# Patient Record
Sex: MALE | Race: WHITE | NOT HISPANIC OR LATINO | Employment: OTHER | URBAN - METROPOLITAN AREA
[De-identification: names, ages, dates, MRNs, and addresses within clinical notes are randomized per-mention and may not be internally consistent; named-entity substitution may affect disease eponyms.]

---

## 2017-03-16 ENCOUNTER — OFFICE VISIT (OUTPATIENT)
Dept: CARDIOLOGY | Facility: CLINIC | Age: 77
DRG: 292 | End: 2017-03-16
Payer: MEDICARE

## 2017-03-16 ENCOUNTER — HOSPITAL ENCOUNTER (INPATIENT)
Facility: HOSPITAL | Age: 77
LOS: 4 days | Discharge: HOME | DRG: 292 | End: 2017-03-20
Attending: EMERGENCY MEDICINE | Admitting: INTERNAL MEDICINE
Payer: MEDICARE

## 2017-03-16 VITALS
SYSTOLIC BLOOD PRESSURE: 98 MMHG | HEART RATE: 146 BPM | HEIGHT: 66 IN | DIASTOLIC BLOOD PRESSURE: 54 MMHG | WEIGHT: 145.31 LBS | BODY MASS INDEX: 23.35 KG/M2

## 2017-03-16 DIAGNOSIS — N18.30 CKD (CHRONIC KIDNEY DISEASE) STAGE 3, GFR 30-59 ML/MIN: ICD-10-CM

## 2017-03-16 DIAGNOSIS — I50.33 ACUTE ON CHRONIC DIASTOLIC CONGESTIVE HEART FAILURE: ICD-10-CM

## 2017-03-16 DIAGNOSIS — I48.91 ATRIAL FIBRILLATION WITH RAPID VENTRICULAR RESPONSE: ICD-10-CM

## 2017-03-16 DIAGNOSIS — I50.9 CONGESTIVE HEART FAILURE, UNSPECIFIED CONGESTIVE HEART FAILURE CHRONICITY, UNSPECIFIED CONGESTIVE HEART FAILURE TYPE: ICD-10-CM

## 2017-03-16 DIAGNOSIS — R79.89 ELEVATED BRAIN NATRIURETIC PEPTIDE (BNP) LEVEL: ICD-10-CM

## 2017-03-16 DIAGNOSIS — I42.9 CARDIOMYOPATHY: ICD-10-CM

## 2017-03-16 DIAGNOSIS — I48.20 CHRONIC ATRIAL FIBRILLATION: Primary | ICD-10-CM

## 2017-03-16 DIAGNOSIS — I48.91 ATRIAL FIBRILLATION WITH RVR: Primary | Chronic | ICD-10-CM

## 2017-03-16 LAB
ALBUMIN SERPL BCP-MCNC: 3.1 G/DL
ALP SERPL-CCNC: 94 U/L
ALT SERPL W/O P-5'-P-CCNC: 27 U/L
ANION GAP SERPL CALC-SCNC: 9 MMOL/L
APTT BLDCRRT: 28 SEC
AST SERPL-CCNC: 29 U/L
BASOPHILS # BLD AUTO: 0.03 K/UL
BASOPHILS NFR BLD: 0.4 %
BILIRUB SERPL-MCNC: 0.9 MG/DL
BNP SERPL-MCNC: 2868 PG/ML
BUN SERPL-MCNC: 34 MG/DL
CALCIUM SERPL-MCNC: 8.7 MG/DL
CHLORIDE SERPL-SCNC: 108 MMOL/L
CO2 SERPL-SCNC: 23 MMOL/L
CREAT SERPL-MCNC: 1.8 MG/DL
DIFFERENTIAL METHOD: ABNORMAL
EOSINOPHIL # BLD AUTO: 0.1 K/UL
EOSINOPHIL NFR BLD: 0.8 %
ERYTHROCYTE [DISTWIDTH] IN BLOOD BY AUTOMATED COUNT: 15.5 %
EST. GFR  (AFRICAN AMERICAN): 41 ML/MIN/1.73 M^2
EST. GFR  (NON AFRICAN AMERICAN): 35 ML/MIN/1.73 M^2
GLUCOSE SERPL-MCNC: 92 MG/DL
HCT VFR BLD AUTO: 37.3 %
HGB BLD-MCNC: 11.7 G/DL
INR PPP: 1.2
LYMPHOCYTES # BLD AUTO: 1.2 K/UL
LYMPHOCYTES NFR BLD: 14.6 %
MCH RBC QN AUTO: 29.7 PG
MCHC RBC AUTO-ENTMCNC: 31.4 %
MCV RBC AUTO: 95 FL
MONOCYTES # BLD AUTO: 1.1 K/UL
MONOCYTES NFR BLD: 12.9 %
NEUTROPHILS # BLD AUTO: 6 K/UL
NEUTROPHILS NFR BLD: 71.3 %
PLATELET # BLD AUTO: 179 K/UL
PMV BLD AUTO: 10.2 FL
POTASSIUM SERPL-SCNC: 4.7 MMOL/L
PROT SERPL-MCNC: 6.8 G/DL
PROTHROMBIN TIME: 12.6 SEC
RBC # BLD AUTO: 3.94 M/UL
SODIUM SERPL-SCNC: 140 MMOL/L
TROPONIN I SERPL DL<=0.01 NG/ML-MCNC: 0.03 NG/ML
TROPONIN I SERPL DL<=0.01 NG/ML-MCNC: 0.04 NG/ML
WBC # BLD AUTO: 8.4 K/UL

## 2017-03-16 PROCEDURE — 85025 COMPLETE CBC W/AUTO DIFF WBC: CPT

## 2017-03-16 PROCEDURE — 84484 ASSAY OF TROPONIN QUANT: CPT | Mod: 91

## 2017-03-16 PROCEDURE — 63600175 PHARM REV CODE 636 W HCPCS: Performed by: EMERGENCY MEDICINE

## 2017-03-16 PROCEDURE — 99215 OFFICE O/P EST HI 40 MIN: CPT | Mod: S$GLB,,, | Performed by: INTERNAL MEDICINE

## 2017-03-16 PROCEDURE — 96374 THER/PROPH/DIAG INJ IV PUSH: CPT

## 2017-03-16 PROCEDURE — 99285 EMERGENCY DEPT VISIT HI MDM: CPT | Mod: 25

## 2017-03-16 PROCEDURE — 96361 HYDRATE IV INFUSION ADD-ON: CPT

## 2017-03-16 PROCEDURE — 96376 TX/PRO/DX INJ SAME DRUG ADON: CPT

## 2017-03-16 PROCEDURE — 85730 THROMBOPLASTIN TIME PARTIAL: CPT

## 2017-03-16 PROCEDURE — 25000003 PHARM REV CODE 250: Performed by: EMERGENCY MEDICINE

## 2017-03-16 PROCEDURE — 1159F MED LIST DOCD IN RCRD: CPT | Mod: S$GLB,,, | Performed by: INTERNAL MEDICINE

## 2017-03-16 PROCEDURE — 99999 PR PBB SHADOW E&M-EST. PATIENT-LVL III: CPT | Mod: PBBFAC,,, | Performed by: INTERNAL MEDICINE

## 2017-03-16 PROCEDURE — 80053 COMPREHEN METABOLIC PANEL: CPT

## 2017-03-16 PROCEDURE — 25000003 PHARM REV CODE 250: Performed by: NURSE PRACTITIONER

## 2017-03-16 PROCEDURE — 93010 ELECTROCARDIOGRAM REPORT: CPT | Mod: ,,, | Performed by: INTERNAL MEDICINE

## 2017-03-16 PROCEDURE — 83880 ASSAY OF NATRIURETIC PEPTIDE: CPT

## 2017-03-16 PROCEDURE — 96375 TX/PRO/DX INJ NEW DRUG ADDON: CPT

## 2017-03-16 PROCEDURE — 85610 PROTHROMBIN TIME: CPT

## 2017-03-16 PROCEDURE — 11000001 HC ACUTE MED/SURG PRIVATE ROOM

## 2017-03-16 PROCEDURE — 1157F ADVNC CARE PLAN IN RCRD: CPT | Mod: S$GLB,,, | Performed by: INTERNAL MEDICINE

## 2017-03-16 PROCEDURE — 93000 ELECTROCARDIOGRAM COMPLETE: CPT | Mod: S$GLB,,, | Performed by: NUCLEAR MEDICINE

## 2017-03-16 PROCEDURE — 1160F RVW MEDS BY RX/DR IN RCRD: CPT | Mod: S$GLB,,, | Performed by: INTERNAL MEDICINE

## 2017-03-16 RX ORDER — DILTIAZEM HYDROCHLORIDE 5 MG/ML
10 INJECTION INTRAVENOUS
Status: COMPLETED | OUTPATIENT
Start: 2017-03-16 | End: 2017-03-16

## 2017-03-16 RX ORDER — FUROSEMIDE 10 MG/ML
40 INJECTION INTRAMUSCULAR; INTRAVENOUS
Status: COMPLETED | OUTPATIENT
Start: 2017-03-16 | End: 2017-03-16

## 2017-03-16 RX ORDER — NAPROXEN SODIUM 220 MG/1
81 TABLET, FILM COATED ORAL DAILY
Status: DISCONTINUED | OUTPATIENT
Start: 2017-03-17 | End: 2017-03-20 | Stop reason: HOSPADM

## 2017-03-16 RX ORDER — ENOXAPARIN SODIUM 100 MG/ML
30 INJECTION SUBCUTANEOUS EVERY 24 HOURS
Status: DISCONTINUED | OUTPATIENT
Start: 2017-03-16 | End: 2017-03-17

## 2017-03-16 RX ORDER — DILTIAZEM HYDROCHLORIDE 5 MG/ML
5 INJECTION INTRAVENOUS
Status: COMPLETED | OUTPATIENT
Start: 2017-03-16 | End: 2017-03-16

## 2017-03-16 RX ORDER — ONDANSETRON 2 MG/ML
4 INJECTION INTRAMUSCULAR; INTRAVENOUS EVERY 12 HOURS PRN
Status: DISCONTINUED | OUTPATIENT
Start: 2017-03-16 | End: 2017-03-20 | Stop reason: HOSPADM

## 2017-03-16 RX ORDER — FUROSEMIDE 10 MG/ML
40 INJECTION INTRAMUSCULAR; INTRAVENOUS 2 TIMES DAILY
Status: DISCONTINUED | OUTPATIENT
Start: 2017-03-17 | End: 2017-03-20

## 2017-03-16 RX ORDER — METOPROLOL TARTRATE 25 MG/1
25 TABLET, FILM COATED ORAL 2 TIMES DAILY
Status: DISCONTINUED | OUTPATIENT
Start: 2017-03-16 | End: 2017-03-17

## 2017-03-16 RX ADMIN — METOPROLOL TARTRATE 25 MG: 25 TABLET ORAL at 09:03

## 2017-03-16 RX ADMIN — ENOXAPARIN SODIUM 30 MG: 100 INJECTION SUBCUTANEOUS at 09:03

## 2017-03-16 RX ADMIN — DILTIAZEM HYDROCHLORIDE 5 MG: 5 INJECTION INTRAVENOUS at 06:03

## 2017-03-16 RX ADMIN — DILTIAZEM HYDROCHLORIDE 10 MG: 5 INJECTION INTRAVENOUS at 03:03

## 2017-03-16 RX ADMIN — FUROSEMIDE 40 MG: 10 INJECTION, SOLUTION INTRAMUSCULAR; INTRAVENOUS at 07:03

## 2017-03-16 NOTE — IP AVS SNAPSHOT
[address] [place] [address]           Patient Discharge Instructions     Our goal is to set you up for success. This packet includes information on your condition, medications, and your home care. It will help you to care for yourself so you don't get sicker and need to go back to the hospital.     Please ask your nurse if you have any questions.        There are many details to remember when preparing to leave the hospital. Here is what you will need to do:    1. Take your medicine. If you are prescribed medications, review your Medication List in the following pages. You may have new medications to  at the pharmacy and others that you'll need to stop taking. Review the instructions for how and when to take your medications. Talk with your doctor or nurses if you are unsure of what to do.     2. Go to your follow-up appointments. Specific follow-up information is listed in the following pages. Your may be contacted by a transition nurse or clinical provider about future appointments. Be sure we have all of the phone numbers to reach you, if needed. Please contact your provider's office if you are unable to make an appointment.     3. Watch for warning signs. Your doctor or nurse will give you detailed warning signs to watch for and when to call for assistance. These instructions may also include educational information about your condition. If you experience any of warning signs to your health, call your doctor.               ** Verify the list of medication(s) below is accurate and up to date. Carry this with you in case of emergency. If your medications have changed, please notify your healthcare provider.             Medication List      START taking these medications        Additional Info                      apixaban 2.5 mg Tab   Quantity:  60 tablet   Refills:  0   Dose:  2.5 mg    Last time this was given:  2.5 mg on 3/20/2017  8:23 AM    Instructions:  Take 1 tablet (2.5 mg total) by mouth 2 (two) times daily.     Begin Date    AM    Noon    PM    Bedtime       aspirin 81 MG Chew   Refills:  0   Dose:  81 mg    Last time this was given:  81 mg on 3/20/2017  8:23 AM   Instructions:  Take 1 tablet (81 mg total) by mouth once daily.     Begin Date    AM    Noon    PM    Bedtime       losartan 25 MG tablet   Commonly known as:  COZAAR   Quantity:  30 tablet   Refills:  0   Dose:  25 mg    Last time this was given:  25 mg on 3/20/2017  8:23 AM   Instructions:  Take 1 tablet (25 mg total) by mouth once daily.     Begin Date    AM    Noon    PM    Bedtime         CHANGE how you take these medications        Additional Info                      furosemide 40 MG tablet   Commonly known as:  LASIX   Quantity:  30 tablet   Refills:  0   Dose:  40 mg   What changed:    - medication strength  - how much to take    Start Date:  3/21/2017   Instructions:  Take 1 tablet (40 mg total) by mouth once daily.     Begin Date    AM    Noon    PM    Bedtime       metoprolol tartrate 50 MG tablet   Commonly known as:  LOPRESSOR   Quantity:  90 tablet   Refills:  0   Dose:  50 mg   What changed:  when to take this    Last time this was given:  50 mg on 3/19/2017  2:20 PM   Instructions:  Take 1 tablet (50 mg total) by mouth every 8 (eight) hours.     Begin Date    AM    Noon    PM    Bedtime            Where to Get Your Medications      These medications were sent to [facility] [address] [facility]  [address] [facility] [address]     Phone:  [phone]     apixaban 2.5 mg Tab    furosemide 40 MG tablet    losartan 25 MG tablet    metoprolol tartrate 50 MG tablet         You can get these medications from any pharmacy     You don't need a prescription for these medications     aspirin 81 MG Chew                  Please bring to all follow up appointments:    1. A copy of your discharge instructions.  2. All medicines you are currently taking in  their original bottles.  3. Identification and insurance card.    Please arrive 15 minutes ahead of scheduled appointment time.    Please call 24 hours in advance if you must reschedule your appointment and/or time.        Follow-up Information     Follow up with Miller Dinero MD In 3 days.    Specialty:  Internal Medicine    Why:  hospital follow up - please evaluate need for continued Lasix dosage    Contact information:    [address]   [address]  [place] [address] [phone]          Follow up with Filipe Bloom NP In 1 week.    Specialty:  Cardiology    Why:  follow up for Atrial fibrillation     Contact information:    [address]  [facility] [address] [phone]          Discharge Instructions     Future Orders    Activity as tolerated     Call MD for:  difficulty breathing or increased cough     Call MD for:  increased confusion or weakness     Call MD for:  persistent dizziness, light-headedness, or visual disturbances     Call MD for:  persistent nausea and vomiting or diarrhea     Call MD for:  severe uncontrolled pain     Call MD for:  temperature >100.4     Diet general     Questions:    Total calories:      Fat restriction, if any:      Protein restriction, if any:      Na restriction, if any:  2gNa    Fluid restriction:      Additional restrictions:          Primary Diagnosis     Your primary diagnosis was:  Atrial Fibrillation (Irregular Heartbeat)      Admission Information     Date & Time Provider Department [facility]    3/16/2017  3:25 PM Cosme Davey MD [facility] -  [phone]      Care Providers     Provider Role Specialty Primary office phone    Cosme Davey MD Attending Provider Internal Medicine [phone]    Roya Dodge MD Consulting Physician  Cardiology [phone]    Jose A Knowles MD Team Attending  General Practice [phone]      Your Vitals Were     BP Pulse Temp Resp Height Weight    88/64 (BP Location: Left arm, Patient Position:  "Lying, BP Method: Automatic) 95 97.8 °F (36.6 °C) (Oral) 18 5' 9" (1.753 m) 64.8 kg (142 lb 12.8 oz)    SpO2 BMI             96% 21.09 kg/m2         Recent Lab Values     No lab values to display.      Allergies as of 3/20/2017     No Known Allergies      [facility]     [facility] Nurse Care Line - 24/7 Assistance  Unless otherwise directed by your provider, please contact [facility], our nurse care line that is available for 24/7 assistance.     Registered nurses in the [facility] provide clinical advisement, health education, appointment booking, and other advisory services.  Call for this free service at [phone].        Advance Directives     An advance directive is a document which, in the event you are no longer able to make decisions for yourself, tells your healthcare team what kind of treatment you do or do not want to receive, or who you would like to make those decisions for you.  If you do not currently have an advance directive, [facility] encourages you to create one.  For more information call:  [phone]), [phone] ([phone]),  or log on to [email or web address].        Language Assistance Services     ATTENTION: Language assistance services are available, free of charge. Please call [phone].      ATENCIÓN: Si habla español, tiene a ozuna disposición servicios gratuitos de asistencia lingüística. Llame al [phone].     [facility] Ý: N?u b?n nói Ti?ng Vi?t, có các d?ch v? h? tr? ngôn ng? mi?n phí dành cho b?n. G?i s? [phone].        Heart Failure Education       Heart Failure: Being Active  You have a condition called heart failure. Being active doesnt mean that you have to wear yourself out. Even a little movement each day helps to strengthen your heart. If you cant get out to exercise, you can do simple stretching and strengthening exercises at home. These are good ways to keep you well-conditioned and prevent you and your heart " from becoming excessively weak.    Ideas to get you started  · Add a little movement to things you do now. Walk to mail letters. Park your car at the far end of the parking lot and walk to the store. Walk up a flight of stairs instead of taking the elevator.  · Choose activities you enjoy. You might walk, swim, or ride an exercise bike. Things like gardening and washing the car count, too. Other possibilities include: washing dishes, walking the dog, walking around the mall, and doing aerobic activities with friends.  · Join a group exercise program at a [facility] or [facility], a senior center, or a community center. Or look into a hospital cardiac rehabilitation program. Ask your doctor if you qualify.  Tips to keep you going  · Get up and get dressed each day. Go to a coffee shop and read a newspaper or go somewhere that you'll be in the presence of other active people. Youll feel more like being active.  · Make a plan. Choose one or more activities that you enjoy and that you can easily do. Then plan to do at least one each day. You might write your plan on a calendar.  · Go with a friend or a group if you like company. This can help you feel supported and stay motivated, too.  · Plan social events that you enjoy. This will keep you mentally engaged as well as physically motivated to do things you find pleasure in.  For your safety  · Talk with your healthcare provider before starting an exercise program.  · Exercise indoors when its too hot or too cold outside, or when the air quality is poor. Try walking at a shopping mall.  · Wear socks and sturdy shoes to maintain your balance and prevent falls.  · Start slowly. Do a few minutes several times a day at first. Increase your time and speed little by little.  · Stop and rest whenever you feel tired or get short of breath.  · Dont push yourself on days when you dont feel well.  Date Last Reviewed: 3/20/2016  © 8821-7391 [employer]. [address] [employer],  [place] [address]. All rights reserved. This information is not intended as a substitute for professional medical care. Always follow your healthcare professional's instructions.              Heart Failure: Evaluating Your Heart  You have a condition called heart failure. To evaluate your condition, your doctor will examine you, ask questions, and do some tests. Along with looking for signs of heart failure, the doctor looks for any other health problems that may have led to heart failure. The results of your evaluation will help your doctor form a treatment plan.  Health history and physical exam  Your visit will start with a health history. Tell the doctor about any symptoms youve noticed and about all medicines you take. Then youll have a physical exam. This includes listening to your heartbeat and breathing. Youll also be checked for swelling (edema) in your legs and neck. When you have fluid buildup or fluid in the lungs, it may be called congestive heart failure.  Diagnosing heart failure     During an echocardiogram, sound waves bounce off the heart. These are converted into a picture on the screen.   The following may be done to help your doctor form a diagnosis:  · X-rays show the size and shape of your heart. These pictures can also show fluid in your lungs.  · An electrocardiogram (ECG or EKG) shows the pattern of your heartbeat. Small pads (electrodes) are placed on your chest, arms, and legs. Wires connect the pads to the ECG machine, which records your hearts electrical signals. This can give the doctor information about heart function.  · An echocardiogram uses ultrasound waves to show the structure and movement of your heart muscle. This shows how well the heart pumps. It also shows the thickness of the heart walls, and if the heart is enlarged. It is one of the most useful, non-invasive tests as it provides information about the heart's general function. This helps your doctor make treatment  decisions.  · Lab tests evaluate small amounts of blood or urine for signs of problems. A BNP lab test can help diagnose and evaluate heart failure. BNP stands for B-type natriuretic peptide. The ventricles secrete more BNP when heart failure worsens. Lab tests can also provide information about metabolic dysfunction or heart dysfunction.  Your treatment plan  Based on the results of your evaluation and tests, your doctor will develop a treatment plan. This plan is designed to relieve some of your heart failure symptoms and help make you more comfortable. Your treatment plan may include:  · Medicine to help your heart work better and improve your quality of life  · Changes in what you eat and drink to help prevent fluid from backing up in your body  · Daily monitoring of your weight and heart failure symptoms to see how well your treatment plan is working  · Exercise to help you stay healthy  · Help with quitting smoking  · Emotional and psychological support to help adjust to the changes  · Referrals to other specialists to make sure you are being treated comprehensively  Date Last Reviewed: 3/21/2016  © 9704-0885 [employer]. [address] [place] [address], [place] [address]. All rights reserved. This information is not intended as a substitute for professional medical care. Always follow your healthcare professional's instructions.              Heart Failure: Making Changes to Your Diet  You have a condition called heart failure. When you have heart failure, excess fluid is more likely to build up in your body because your heart isn't working well. This makes the heart work harder to pump blood. Fluid buildup causes symptoms such as shortness of breath and swelling (edema). This is often referred to as congestive heart failure or CHF. Controlling the amount of salt (sodium) you eat may help stop fluid from building up. Your doctor may also tell you to reduce the amount of fluid you drink.  Reading food  labels    Your healthcare provider will tell you how much sodium you can eat each day. Read food labels to keep track. Keep in mind that certain foods are high in salt. These include canned, frozen, and processed foods. Check the amount of sodium in each serving. Watch out for high-sodium ingredients. These include MSG (monosodium glutamate), baking soda, and sodium phosphate.   Eating less salt  Give yourself time to get used to eating less salt. It may take a little while. Here are some tips to help:  · Take the saltshaker off the table. Replace it with salt-free herb mixes and spices.  · Eat fresh or plain frozen vegetables. These have much less salt than canned vegetables.  · Choose low-sodium snacks like sodium-free pretzels, crackers, or air-popped popcorn.  · Dont add salt to your food when youre cooking. Instead, season your foods with pepper, lemon, garlic, or onion.  · When you eat out, ask that your food be cooked without added salt.  · Avoid eating fried foods as these often have a great deal of salt.  If youre told to limit fluids  You may need to limit how much fluid you have to help prevent swelling. This includes anything that is liquid at room temperature, such as ice cream and soup. If your doctor tells you to limit fluid, try these tips:  · Measure drinks in a measuring cup before you drink them. This will help you meet daily goals.  · Chill drinks to make them more refreshing.  · Suck on frozen lemon wedges to quench thirst.  · Only drink when youre thirsty.  · Chew sugarless gum or suck on hard candy to keep your mouth moist.  · Weigh yourself daily to know if your body's fluid content is rising.  My sodium goal  Your healthcare provider may give you a sodium goal to meet each day. This includes sodium found in food as well as salt that you add. My goal is to eat no more than ___________ mg of sodium per day.     When to call your doctor  Call your doctor right away if you have any symptoms  of worsening heart failure. These can include:  · Sudden weight gain  · Increased swelling of your legs or ankles  · Trouble breathing when youre resting or at night  · Increase in the number of pillows you have to sleep on  · Chest pain, pressure, discomfort, or pain in the jaw, neck, or back   Date Last Reviewed: 3/21/2016  © 6599-7187 [employer]. [address] [place] [address], [place] [address]. All rights reserved. This information is not intended as a substitute for professional medical care. Always follow your healthcare professional's instructions.              Heart Failure: Medicines to Help Your Heart    You have a condition called heart failure (also known as congestive heart failure, or CHF). Your doctor will likely prescribe medicines for heart failure and any underlying health problems you have. Most heart failure patients take one or more types of medicinen. Your healthcare provider will work to find the combination of medicines that works best for you.  Heart failure medicines  Here are the most common heart failure medicines:  · ACE inhibitors lower blood pressure and decrease strain on the heart. This makes it easier for the heart to pump. Angiotensin receptor blockers have similar effects. These are prescribed for some patients instead of ACE inhibitors.  · Beta-blockers relieve stress on the heart. They also improve symptoms. They may also improve the heart's pumping action over time.  · Diuretics (also called water pills) help rid your body of excess water. This can help rid your body of swelling (edema). Having less fluid to pump means your heart doesnt have to work as hard. Some diuretics make your body lose a mineral called potassium. Your doctor will tell you if you need to take supplements or eat more foods high in potassium.  · Digoxin helps your heart pump with more strength. This helps your heart pump more blood with each beat. So, more oxygen-rich blood travels to the rest  of the body.  · Aldosterone antagonists help alter hormones and decrease strain on the heart.  · Hydralazine and nitrates are two separate medicines used together to treat heart failure. They may come in one combination pill. They lower blood pressure and decrease how hard the heart has to pump.  Medicines for related conditions  Controlling other heart problems helps keep heart failure under control, too. Depending on other heart problems you have, medicines may be prescribed to:  · Lower blood pressure (antihypertensives).  · Lower cholesterol levels (statins).  · Prevent blood clots (anticoagulants or aspirin).  · Keep the heartbeat steady (antiarrhythmics).  Date Last Reviewed: 3/5/2016  © 8154-5794 [employer]. [address] [place] [address], [place] [address]. All rights reserved. This information is not intended as a substitute for professional medical care. Always follow your healthcare professional's instructions.              Heart Failure: Procedures That May Help    The heart is a muscle that pumps oxygen-rich blood to all parts of the body. When you have heart failure, the heart is not able to pump as well as it should. Blood and fluid may back up into the lungs (congestive heart failure), and some parts of the body dont get enough oxygen-rich blood to work normally. These problems lead to the symptoms of heart failure.     Certain procedures may help the heart pump better in some cases of heart failure. Some procedures are done to treat health problems that may have caused the heart failure such as coronary artery disease or heart rhythm problems. For more serious heart failure, other options are available.  Treating artery and valve problems  If you have coronary artery disease or valve disease, procedures may be done to improve blood flow. This helps the heart pump better, which can improve heart failure symptoms. First, your doctor may do a cardiac catheterization to help detect clogged  blood vessels or valve damage. During this procedure, a  thin tube (catheter) in inserted into a blood vessel and guided to the heart. There a dye is injected and a special type of X-ray (angiogram) is taken of the blood vessels. Procedures to open a blocked artery or fix damaged valves can also be done using catheterization.  · Angioplasty uses a balloon-tipped instrument at the end of the catheter. The balloon is inflated to widen the narrowed artery. In many cases, a stent is expanded to further support the narrowed artery. A stent is a metal mesh tube.  · Valve surgery repairs or replacement of faulty valves can also be done during catheterization so blood can flow properly through the chambers of the heart.  Bypass surgery is another option to help treat blocked arteries. It uses a healthy blood vessel from elsewhere in the body. The healthy blood vessel is attached above and below the blocked area so that blood can flow around the blocked artery.  Treating heart rhythm problems  A device may be placed in the chest to help a weak heart maintain a healthy, heartbeat so the heart can pump more effectively:  · Pacemaker. A pacemaker is an implanted device that regulates your heartbeat electronically. It monitors your heart's rhythm and generates a painless electric impulse that helps the heart beat in a regular rhythm. A pacemaker is programmed to meet your specific heart rhythm needs.  · Biventricular pacing/cardiac resynchronization therapy. A type of pacemaker that paces both pumping chambers of the heart at the same time to coordinate contractions and to improve the heart's function. Some people with heart failure are candidates for this therapy.  · Implantable cardioverter defibrillator. A device similar to a pacemaker that senses when the heart is beating too fast and delivers an electrical shock to convert the fast rhythm to a normal rhythm. This can be a life saving device.  In severe cases  In more  serious cases of heart failure when other treatments no longer work, other options may include:  · Ventricular assist devices (VADs). These are mechanical devices used to take over the pumping function for one or both of the heart's ventricles, or pumping chambers. A VAD may be necessary when heart failure progresses to the point that medicines and other treatments no longer help. In some cases, a VAD may be used as a bridge to transplant.  · Heart transplant. This is replacing the diseased heart with a healthy one from a donor. This is an option for a few people who are very sick. A heart transplant is very serious and not an option for all patients. Your doctor can tell you more.  Date Last Reviewed: 3/20/2016  © 2796-2602 [employer]. [address] [place] [address], [place] [address]. All rights reserved. This information is not intended as a substitute for professional medical care. Always follow your healthcare professional's instructions.              Heart Failure: Tracking Your Weight  You have a condition called heart failure. When you have heart failure, a sudden weight gain or a steady rise in weight is a warning sign that your body is retaining too much water and salt. This could mean your heart failure is getting worse. If left untreated, it can cause problems for your lungs and result in shortness of breath. Weighing yourself each day is the best way to know if youre retaining water. If your weight goes up quickly, call your doctor. You will be given instructions on how to get rid of the excess water. You will likely need medicines and to avoid salt. This will help your heart work better.  Call your doctor if you gain more than 2 pounds in 1 day, more than 5 pounds in 1 week, or whatever weight gain you were told to report by your doctor. This is often a sign of worsening heart failure and needs to be evaluated and treated. Your doctor will tell you what to do next.   Tips for weighing  yourself    · Weigh yourself at the same time each morning, wearing the same clothes. Weigh yourself after urinating and before eating.  · Use the same scale each day. Make sure the numbers are easy to read. Put the scale on a flat, hard surface -- not on a rug or carpet.  · Do not stop weighing yourself. If you forget one day, weigh again the next morning.  How to use your weight chart  · Keep your weight chart near the scale. Write your weight on the chart as soon as you get off the scale.  · Fill in the month and the start date on the chart. Then write down your weight each day. Your chart will look like this:    · If you miss a day, leave the space blank. Weigh yourself the next day and write your weight in the next space.  · Take your weight chart with you when you go to see your doctor.  Date Last Reviewed: 3/20/2016  © 5736-9024 [employer]. [address] [place] [address], [place] [address]. All rights reserved. This information is not intended as a substitute for professional medical care. Always follow your healthcare professional's instructions.              Heart Failure: Warning Signs of a Flare-Up  You have a condition called heart failure. Once you have heart failure, flare-ups can happen. Below are signs that can mean your heart failure is getting worse. If you notice any of these warning signs, call your healthcare provider.  Swelling    · Your feet, ankles, or lower legs get puffier.  · You notice skin changes on your lower legs.  · Your shoes feel too tight.  · Your clothes are tighter in the waist.  · You have trouble getting rings on or off your fingers.  Shortness of breath  · You have to breathe harder even when youre doing your normal activities or when youre resting.  · You are short of breath walking up stairs or even short distances.  · You wake up at night short of breath or coughing.  · You need to use more pillows or sit up to sleep.  · You wake up tired or restless.  Other  warning signs  · You feel weaker, dizzy, or more tired.  · You have chest pain or changes in your heartbeat.  · You have a cough that wont go away.  · You cant remember things or dont feel like eating.  Tracking your weight  Gaining weight is often the first warning sign that heart failure is getting worse. Gaining even a few pounds can be a sign that your body is retaining excess water and salt. Weighing yourself each day in the morning after you urinate and before you eat, is the best way to know if you're retaining water. Get a scale that is easy to read and make sure you wear the same clothes and use the same scale every time you weigh. Your healthcare provider will show you how to track your weight. Call your doctor if you gain more than 2 pounds in 1 day, 5 pounds in 1 week, or whatever weight gain you were told to report by your doctor. This is often a sign of worsening heart failure and needs to be evaluated and treated before it compromises your breathing. Your doctor will tell you what to do next.    Date Last Reviewed: 3/15/2016  © 9117-5263 [employer]. [address] [place] [address]. All rights reserved. This information is not intended as a substitute for professional medical care. Always follow your healthcare professional's instructions.              Chronic Kindey Disease Education             [employer]           [employer] Sign-Up     Activating your [facility] account is as easy as 1-2-3!     1) Visit [email or web address], select Sign Up Now, enter this activation code and your date of birth, then select Next.  [email or web address]  Expires: 4/30/2017  3:07 PM      2) Create a username and password to use when you visit [facility] in the future and select a security question in case you lose your password and select Next.    3) Enter your e-mail address and click Sign Up!    Additional Information  If you have questions, please e-mail [email or web address] or call  [phone] to talk to our [facility] staff. Remember, [facility] is NOT to be used for urgent needs. For medical emergencies, dial 911.          [facility] complies with applicable Federal civil rights laws and does not discriminate on the basis of race, color, national origin, age, disability, or sex.

## 2017-03-16 NOTE — ED NOTES
Family at the bedside. Oriented to room and plan of care, verbalized understanding. Cart in low position, siderails up x 2 and call bell in reach. Will continue to monitor.

## 2017-03-16 NOTE — ED PROVIDER NOTES
SCRIBE #1 NOTE: I, Jeri Supriya, am scribing for, and in the presence of, Raji Rabago MD. I have scribed the entire note.      History      Chief Complaint   Patient presents with    Atrial Fibrillation     with RVR, rate of 148, sent to ER by Dr. Lazaro; pt also c/o BLE edema and weeping leg wounds       Review of patient's allergies indicates:  No Known Allergies     HPI   HPI    3/16/2017, 3:54 PM   History obtained from the patient      History of Present Illness: Yan Pickering Jr. is a 77 y.o. male patient, with a PMHx of AFib, who presents to the Emergency Department for leg swelling which onset gradually a few months ago, but began worsening several days ago. Swelling is located to the BLE up to the thighs. Sx have been constant and moderate to severe in degree. No modifying factors noted. Pt was seen by Dr. Lazaro (Cardiology) PTA and was sent to ED, because pt was in AFib with RVR. Associated sx include LUNA, generalized weakness, fatigue, weight gain, and weeping from BLE. Pt states that his baseline weight is 112 and he currently weighs 145. Pt states that he took himself off of all of his medication several months ago. Pt denies any fever, chills, CP, diaphoresis, cough, calf pain, n/v, or dizziness. No further complaints at this time.       Arrival mode: Personal Transportation.     PCP: Miller Dinero MD       Past Medical History:  Past Medical History:   Diagnosis Date    Atrial fibrillation     CKD (chronic kidney disease) stage 3, GFR 30-59 ml/min     Mild mitral insufficiency     Moderate aortic insufficiency     Venous stasis dermatitis of both lower extremities        Past Surgical History:  Reviewed. Not pertinent       Family History:  Reviewed. Not pertinent     Social History:  Social History     Social History Main Topics    Smoking status: Never Smoker    Smokeless tobacco: Never Used    Alcohol use No    Drug use: No    Sexual activity: Unknown        ROS   Review of  Systems   Constitutional: Positive for fatigue and unexpected weight change. Negative for chills, diaphoresis and fever.        (+) generalized weakness   HENT: Negative for sore throat.    Respiratory: Positive for shortness of breath (LUNA). Negative for cough.    Cardiovascular: Positive for leg swelling. Negative for chest pain.   Gastrointestinal: Negative for abdominal pain, blood in stool, constipation, diarrhea, nausea and vomiting.   Genitourinary: Negative for dysuria.   Musculoskeletal: Negative for back pain.        (-) calf pain   Skin: Negative for rash.        (+) weeping from BLE   Neurological: Negative for weakness.   Hematological: Does not bruise/bleed easily.     Physical Exam    Initial Vitals   BP Pulse Resp Temp SpO2   03/16/17 1524 03/16/17 1524 03/16/17 1524 03/16/17 1524 03/16/17 1524   112/76 153 18 98 °F (36.7 °C) 97 %      Physical Exam  Nursing Notes and Vital Signs Reviewed.  Constitutional: Patient is in no acute distress. Awake and alert. Well-developed and well-nourished.  Head: Atraumatic. Normocephalic.  Eyes: PERRL. EOM intact. Conjunctivae are not pale. No scleral icterus.  ENT: Mucous membranes are moist. Oropharynx is clear and symmetric.    Neck: Supple. Full ROM. No lymphadenopathy.  Cardiovascular: Irregularly irregular rhythm. Tachycardic. No murmurs, rubs, or gallops. Distal pulses are 2+ and symmetric.  Pulmonary/Chest: No respiratory distress. Clear to auscultation bilaterally. No wheezing, rales, or rhonchi.  Abdominal: Soft and non-distended.  There is no tenderness.  No rebound, guarding, or rigidity.   Musculoskeletal: Moves all extremities. No obvious deformities. 2-3+ BLE pitting edema up to the thighs. No calf tenderness.  Skin: Warm and dry.  Neurological:  Alert, awake, and appropriate.  Normal speech.  No acute focal neurological deficits are appreciated.  Psychiatric: Normal affect. Good eye contact. Appropriate in content.    ED Course    Procedures  ED  Vital Signs:  Vitals:    03/17/17 0501 03/17/17 0541 03/17/17 0621 03/17/17 0702   BP: 101/66 101/67 100/70 108/88   Pulse: (!) 116 (!) 118 (!) 121 (!) 123   Resp: 17 16 16 (!) 24   Temp:       TempSrc:       SpO2: 98% 99% 98% 97%   Weight:       Height:        03/17/17 0730 03/17/17 0821 03/17/17 1001 03/17/17 1121   BP:  110/65 104/73 99/60   Pulse: (!) 122 (!) 138 (!) 125 (!) 128   Resp:  19 (!) 21 19   Temp:       TempSrc:       SpO2:  98% 95% 98%   Weight:       Height:        03/17/17 1201 03/17/17 1243 03/17/17 1536 03/17/17 1959   BP: 101/60 112/70 100/63 108/76   Pulse: (!) 126 (!) 127 (!) 127 (!) 126   Resp: 19 19 18 18   Temp:  97.5 °F (36.4 °C) 97.5 °F (36.4 °C) 97.5 °F (36.4 °C)   TempSrc:  Oral Oral Oral   SpO2: 98% 97% (!) 94% 97%   Weight:       Height:        03/17/17 2332 03/18/17 0435 03/18/17 0800   BP: 117/85 111/65 115/77   Pulse: (!) 133 (!) 117 (!) 114   Resp: 20 20 18   Temp: 97.5 °F (36.4 °C) 97.6 °F (36.4 °C) 97.5 °F (36.4 °C)   TempSrc: Oral Oral Oral   SpO2: 97% 95% (!) 93%   Weight:      Height:          Abnormal Lab Results:  Labs Reviewed   CBC W/ AUTO DIFFERENTIAL - Abnormal; Notable for the following:        Result Value    RBC 3.94 (*)     Hemoglobin 11.7 (*)     Hematocrit 37.3 (*)     MCHC 31.4 (*)     RDW 15.5 (*)     Mono # 1.1 (*)     Lymph% 14.6 (*)     All other components within normal limits    Narrative:     PLEASE REVIEW ORDER START TIME BEFORE MARKING SPECIMEN  COLLECTED.   COMPREHENSIVE METABOLIC PANEL - Abnormal; Notable for the following:     BUN, Bld 34 (*)     Creatinine 1.8 (*)     Albumin 3.1 (*)     eGFR if  41 (*)     eGFR if non  35 (*)     All other components within normal limits    Narrative:     PLEASE REVIEW ORDER START TIME BEFORE MARKING SPECIMEN  COLLECTED.   PROTIME-INR - Abnormal; Notable for the following:     Prothrombin Time 12.6 (*)     All other components within normal limits    Narrative:     PLEASE REVIEW ORDER  START TIME BEFORE MARKING SPECIMEN  COLLECTED.   TROPONIN I - Abnormal; Notable for the following:     Troponin I 0.035 (*)     All other components within normal limits    Narrative:     PLEASE REVIEW ORDER START TIME BEFORE MARKING SPECIMEN  COLLECTED.   B-TYPE NATRIURETIC PEPTIDE - Abnormal; Notable for the following:     BNP 2868 (*)     All other components within normal limits    Narrative:     PLEASE REVIEW ORDER START TIME BEFORE MARKING SPECIMEN  COLLECTED.   TROPONIN I - Abnormal; Notable for the following:     Troponin I 0.033 (*)     All other components within normal limits    Narrative:     PLEASE REVIEW ORDER START TIME BEFORE MARKING SPECIMEN  COLLECTED.   TROPONIN I - Abnormal; Notable for the following:     Troponin I 0.035 (*)     All other components within normal limits   COMPREHENSIVE METABOLIC PANEL - Abnormal; Notable for the following:     CO2 21 (*)     BUN, Bld 33 (*)     Creatinine 1.7 (*)     Calcium 8.0 (*)     Total Protein 5.9 (*)     Albumin 2.6 (*)     Total Bilirubin 1.1 (*)     AST 43 (*)     eGFR if  44 (*)     eGFR if non  38 (*)     All other components within normal limits   CBC W/ AUTO DIFFERENTIAL - Abnormal; Notable for the following:     RBC 3.65 (*)     Hemoglobin 10.9 (*)     Hematocrit 34.3 (*)     MCHC 31.8 (*)     RDW 15.6 (*)     Lymph # 0.9 (*)     Lymph% 14.3 (*)     All other components within normal limits   APTT    Narrative:     PLEASE REVIEW ORDER START TIME BEFORE MARKING SPECIMEN  COLLECTED.        All Lab Results:  Results for orders placed or performed during the hospital encounter of 03/16/17   CBC auto differential   Result Value Ref Range    WBC 8.40 3.90 - 12.70 K/uL    RBC 3.94 (L) 4.60 - 6.20 M/uL    Hemoglobin 11.7 (L) 14.0 - 18.0 g/dL    Hematocrit 37.3 (L) 40.0 - 54.0 %    MCV 95 82 - 98 fL    MCH 29.7 27.0 - 31.0 pg    MCHC 31.4 (L) 32.0 - 36.0 %    RDW 15.5 (H) 11.5 - 14.5 %    Platelets 179 150 - 350 K/uL    MPV  10.2 9.2 - 12.9 fL    Gran # 6.0 1.8 - 7.7 K/uL    Lymph # 1.2 1.0 - 4.8 K/uL    Mono # 1.1 (H) 0.3 - 1.0 K/uL    Eos # 0.1 0.0 - 0.5 K/uL    Baso # 0.03 0.00 - 0.20 K/uL    Gran% 71.3 38.0 - 73.0 %    Lymph% 14.6 (L) 18.0 - 48.0 %    Mono% 12.9 4.0 - 15.0 %    Eosinophil% 0.8 0.0 - 8.0 %    Basophil% 0.4 0.0 - 1.9 %    Differential Method Automated    Comprehensive metabolic panel   Result Value Ref Range    Sodium 140 136 - 145 mmol/L    Potassium 4.7 3.5 - 5.1 mmol/L    Chloride 108 95 - 110 mmol/L    CO2 23 23 - 29 mmol/L    Glucose 92 70 - 110 mg/dL    BUN, Bld 34 (H) 8 - 23 mg/dL    Creatinine 1.8 (H) 0.5 - 1.4 mg/dL    Calcium 8.7 8.7 - 10.5 mg/dL    Total Protein 6.8 6.0 - 8.4 g/dL    Albumin 3.1 (L) 3.5 - 5.2 g/dL    Total Bilirubin 0.9 0.1 - 1.0 mg/dL    Alkaline Phosphatase 94 55 - 135 U/L    AST 29 10 - 40 U/L    ALT 27 10 - 44 U/L    Anion Gap 9 8 - 16 mmol/L    eGFR if African American 41 (A) >60 mL/min/1.73 m^2    eGFR if non African American 35 (A) >60 mL/min/1.73 m^2   Protime-INR   Result Value Ref Range    Prothrombin Time 12.6 (H) 9.0 - 12.5 sec    INR 1.2 0.8 - 1.2   Troponin I   Result Value Ref Range    Troponin I 0.035 (H) 0.000 - 0.026 ng/mL   B-Type natriuretic peptide (BNP)   Result Value Ref Range    BNP 2868 (H) 0 - 99 pg/mL   APTT   Result Value Ref Range    aPTT 28.0 21.0 - 32.0 sec   Troponin I   Result Value Ref Range    Troponin I 0.033 (H) 0.000 - 0.026 ng/mL   Troponin I   Result Value Ref Range    Troponin I 0.035 (H) 0.000 - 0.026 ng/mL   Comprehensive metabolic panel   Result Value Ref Range    Sodium 140 136 - 145 mmol/L    Potassium 4.3 3.5 - 5.1 mmol/L    Chloride 109 95 - 110 mmol/L    CO2 21 (L) 23 - 29 mmol/L    Glucose 78 70 - 110 mg/dL    BUN, Bld 33 (H) 8 - 23 mg/dL    Creatinine 1.7 (H) 0.5 - 1.4 mg/dL    Calcium 8.0 (L) 8.7 - 10.5 mg/dL    Total Protein 5.9 (L) 6.0 - 8.4 g/dL    Albumin 2.6 (L) 3.5 - 5.2 g/dL    Total Bilirubin 1.1 (H) 0.1 - 1.0 mg/dL    Alkaline  Phosphatase 82 55 - 135 U/L    AST 43 (H) 10 - 40 U/L    ALT 29 10 - 44 U/L    Anion Gap 10 8 - 16 mmol/L    eGFR if African American 44 (A) >60 mL/min/1.73 m^2    eGFR if non African American 38 (A) >60 mL/min/1.73 m^2   CBC auto differential   Result Value Ref Range    WBC 6.16 3.90 - 12.70 K/uL    RBC 3.65 (L) 4.60 - 6.20 M/uL    Hemoglobin 10.9 (L) 14.0 - 18.0 g/dL    Hematocrit 34.3 (L) 40.0 - 54.0 %    MCV 94 82 - 98 fL    MCH 29.9 27.0 - 31.0 pg    MCHC 31.8 (L) 32.0 - 36.0 %    RDW 15.6 (H) 11.5 - 14.5 %    Platelets 162 150 - 350 K/uL    MPV 10.6 9.2 - 12.9 fL    Gran # 4.4 1.8 - 7.7 K/uL    Lymph # 0.9 (L) 1.0 - 4.8 K/uL    Mono # 0.8 0.3 - 1.0 K/uL    Eos # 0.1 0.0 - 0.5 K/uL    Baso # 0.02 0.00 - 0.20 K/uL    Gran% 70.6 38.0 - 73.0 %    Lymph% 14.3 (L) 18.0 - 48.0 %    Mono% 12.5 4.0 - 15.0 %    Eosinophil% 2.3 0.0 - 8.0 %    Basophil% 0.3 0.0 - 1.9 %    Differential Method Automated    Comprehensive metabolic panel   Result Value Ref Range    Sodium 140 136 - 145 mmol/L    Potassium 4.5 3.5 - 5.1 mmol/L    Chloride 108 95 - 110 mmol/L    CO2 23 23 - 29 mmol/L    Glucose 96 70 - 110 mg/dL    BUN, Bld 39 (H) 8 - 23 mg/dL    Creatinine 1.9 (H) 0.5 - 1.4 mg/dL    Calcium 8.1 (L) 8.7 - 10.5 mg/dL    Total Protein 5.9 (L) 6.0 - 8.4 g/dL    Albumin 3.2 (L) 3.5 - 5.2 g/dL    Total Bilirubin 0.8 0.1 - 1.0 mg/dL    Alkaline Phosphatase 84 55 - 135 U/L    AST 56 (H) 10 - 40 U/L    ALT 38 10 - 44 U/L    Anion Gap 9 8 - 16 mmol/L    eGFR if African American 38 (A) >60 mL/min/1.73 m^2    eGFR if non African American 33 (A) >60 mL/min/1.73 m^2   CBC auto differential   Result Value Ref Range    WBC 6.85 3.90 - 12.70 K/uL    RBC 3.73 (L) 4.60 - 6.20 M/uL    Hemoglobin 11.0 (L) 14.0 - 18.0 g/dL    Hematocrit 35.4 (L) 40.0 - 54.0 %    MCV 95 82 - 98 fL    MCH 29.5 27.0 - 31.0 pg    MCHC 31.1 (L) 32.0 - 36.0 %    RDW 15.5 (H) 11.5 - 14.5 %    Platelets 172 150 - 350 K/uL    MPV 10.4 9.2 - 12.9 fL    Gran # 5.3 1.8 - 7.7  K/uL    Lymph # 0.8 (L) 1.0 - 4.8 K/uL    Mono # 0.7 0.3 - 1.0 K/uL    Eos # 0.0 0.0 - 0.5 K/uL    Baso # 0.02 0.00 - 0.20 K/uL    Gran% 77.2 (H) 38.0 - 73.0 %    Lymph% 11.8 (L) 18.0 - 48.0 %    Mono% 10.1 4.0 - 15.0 %    Eosinophil% 0.6 0.0 - 8.0 %    Basophil% 0.3 0.0 - 1.9 %    Differential Method Automated    2D echo with color flow doppler   Result Value Ref Range    EF 30 (A) 55 - 65    Mitral Valve Regurgitation MODERATE (A)     Diastolic Dysfunction No     Aortic Valve Regurgitation MILD     Est. PA Systolic Pressure 39.94     Pericardial Effusion SMALL (A)          Imaging Results:  Imaging Results         X-Ray Chest 1 View (Final result) Result time:  03/16/17 16:02:56    Procedure changed from X-Ray Chest PA And Lateral        Final result by Judith Roman MD (03/16/17 16:02:56)    Impression:     Pleural effusions and cardiomegaly.          Electronically signed by: JUDITH ROMAN MD  Date:     03/16/17  Time:    16:02     Narrative:    Exam: XR CHEST 1 VIEW    Clinical History: Acute onset Chest Pain    Findings:     Small bilateral pleural effusions.  Mild cardiomegaly.  no acute infiltrate detected.             The EKG was ordered, reviewed, and independently interpreted by the ED provider.  Interpretation time: 15:30  Rate: 171 BPM  Rhythm: atrial fibrillation with RVR  Interpretation: Left anterior fascicular block. No STEMI.         The Emergency Provider reviewed the vital signs and test results, which are outlined above.    ED Discussion     6:28 PM: Discussed case with Lavonne Hart NP ([facility]). Lavonne agrees with current care and management of pt and accepts admission.   Admitting Service: Hospital medicine   Admitting Physician: Dr. Cordero  Admit to: Telemetry       ED Medication(s):  Medications   ondansetron injection 4 mg (not administered)   furosemide injection 40 mg (40 mg Intravenous Given 3/18/17 0840)   aspirin chewable tablet 81 mg (81 mg Oral Given 3/18/17 0840)    metoprolol tartrate (LOPRESSOR) tablet 25 mg (25 mg Oral Given 3/18/17 0614)   apixaban tablet 2.5 mg (2.5 mg Oral Given 3/18/17 0840)   alprazolam tablet 0.25 mg (0.25 mg Oral Given 3/17/17 2337)   diltiaZEM injection 10 mg (10 mg Intravenous Given 3/16/17 1555)   furosemide injection 40 mg (40 mg Intravenous Given 3/16/17 1926)   diltiaZEM injection 5 mg (5 mg Intravenous Given 3/16/17 1846)   0.9%  NaCl infusion (0 mLs Intravenous Stopped 3/17/17 0315)   albumin human 5% bottle 50 g (50 g Intravenous New Bag 3/17/17 2028)   metoprolol tartrate (LOPRESSOR) tablet 25 mg (25 mg Oral Given 3/17/17 2207)   metoprolol injection 5 mg (5 mg Intravenous Given 3/17/17 2333)     Medical Decision Making    Medical Decision Making:   Clinical Tests:   Lab Tests: Ordered and Reviewed  Radiological Study: Ordered and Reviewed  Medical Tests: Ordered and Reviewed           Scribe Attestation:   Scribe #1: I performed the above scribed service and the documentation accurately describes the services I performed. I attest to the accuracy of the note.    Attending:   Physician Attestation Statement for Scribe #1: I, Raji Rabago MD, personally performed the services described in this documentation, as scribed by Jeri Epps, in my presence, and it is both accurate and complete.          Clinical Impression       ICD-10-CM ICD-9-CM   1. Congestive heart failure, unspecified congestive heart failure chronicity, unspecified congestive heart failure type I50.9 428.0   2. Elevated brain natriuretic peptide (BNP) level R79.89 790.99   3. Atrial fibrillation with rapid ventricular response I48.91 427.31   4. CKD (chronic kidney disease) stage 3, GFR 30-59 ml/min N18.3 585.3       Disposition:   Disposition: Admitted  Condition: Sammy Rabago MD  03/18/17 1013

## 2017-03-16 NOTE — MR AVS SNAPSHOT
[facility] Card[facility]  [address] [place] [address]  Phone: [phone]  Fax: [phone]                  Yan Pickering Jr.   3/16/2017 2:20 PM   Office Visit    Description:  Male : 1940   Provider:  Alfredo Lazaro MD   Department:  [facility] - Cardiology           Reason for Visit     Edema           Diagnoses this Visit        Comments    Chronic atrial fibrillation    -  Primary     Acute on chronic diastolic congestive heart failure         CKD (chronic kidney disease) stage 3, GFR 30-59 ml/min                To Do List           Future Appointments        Provider Department Dept Phone    3/20/2017 2:20 PM Brenden Grigsby MD [facility] Internal Medicine [phone]      Goals (5 Years of Data)     None      Follow-Up and Disposition     Return if symptoms worsen or fail to improve.      [facility]     [facility] Care [facility] -  Assistance  Registered nurses in the [facility] provide clinical advisement, health education, appointment booking, and other advisory services.  Call for this free service at [phone].             Medications           Message regarding Medications     Verify the changes and/or additions to your medication regime listed below are the same as discussed with your clinician today.  If any of these changes or additions are incorrect, please notify your healthcare provider.        STOP taking these medications     diltiazem (CARDIZEM CD) 120 MG Cp24 Take 1 capsule (120 mg total) by mouth once daily.    rivaroxaban (XARELTO) 15 mg Tab Take 1 tablet (15 mg total) by mouth daily with dinner or evening meal.           Verify that the below list of medications is an accurate representation of the medications you are currently taking.  If none reported, the list may be blank. If incorrect, please contact your healthcare provider. Carry this list with you in case of emergency.           Current Medications     furosemide (LASIX) 20 MG tablet Take  "1 tablet (20 mg total) by mouth once daily.    metoprolol tartrate (LOPRESSOR) 50 MG tablet Take 1 tablet (50 mg total) by mouth 2 (two) times daily.           Clinical Reference Information           Your Vitals Were     BP Pulse Height Weight BMI    98/54 (BP Location: Left arm) 146 5' 6" (1.676 m) 65.9 kg (145 lb 4.5 oz) 23.45 kg/m2      Blood Pressure          Most Recent Value    BP  (!)  98/54      Allergies as of 3/16/2017     No Known Allergies      Immunizations Administered on Date of Encounter - 3/16/2017     None      [facility] Sign-Up     Activating your [facility] account is as easy as 1-2-3!     1) Visit [email or web address], select Sign Up Now, enter this activation code and your date of birth, then select Next.  [email or web address]  Expires: 4/30/2017  3:07 PM      2) Create a username and password to use when you visit [facility] in the future and select a security question in case you lose your password and select Next.    3) Enter your e-mail address and click Sign Up!    Additional Information  If you have questions, please e-mail [email or web address] or call [phone] to talk to our [facility] staff. Remember, [facility] is NOT to be used for urgent needs. For medical emergencies, dial 911.         Language Assistance Services     ATTENTION: Language assistance services are available, free of charge. Please call [phone].      ATENCIÓN: Si habla dell, tiene a ozuna disposición servicios gratuitos de asistencia lingüística. Llame al [phone].     [facility] Ý: N?u b?n nói Ti?ng Vi?t, có các d?ch v? h? tr? ngôn ng? mi?n phí dành cho b?n. G?i s? [phone].         [facility] complies with applicable Federal civil rights laws and does not discriminate on the basis of race, color, national origin, age, disability, or sex.        "

## 2017-03-16 NOTE — PROGRESS NOTES
Subjective:   Patient ID:  Yan Pickering Jr. is a 76 y.o. male who presents for follow up of Edema      HPI Comments: 77 yo male, came in for leg swelling.   PMH chronic afib, leg swelling, moderate AI, and CKD.  Saw Dr. Velez in  and stopped medicines in the middle of 2016 by himself. Everything was fine until , when he restarted leg swelling, and leg ulcer oozing. LUNA has been worse worse. No chest pain, orthopnea and PND.   C/o nose bleeding when he was on Xarelto.  EKG showed afib with RVR.      Past Medical History:   Diagnosis Date    Atrial fibrillation     CKD (chronic kidney disease) stage 3, GFR 30-59 ml/min     Mild mitral insufficiency     Moderate aortic insufficiency     Venous stasis dermatitis of both lower extremities        History reviewed. No pertinent surgical history.    Social History   Substance Use Topics    Smoking status: Never Smoker    Smokeless tobacco: Never Used    Alcohol use No       History reviewed. No pertinent family history.      Review of Systems   Constitution: Negative for decreased appetite, diaphoresis, fever, weakness, malaise/fatigue and night sweats.   HENT: Negative for headaches and nosebleeds.    Eyes: Negative for blurred vision and double vision.   Cardiovascular: Positive for dyspnea on exertion and leg swelling. Negative for chest pain, claudication, irregular heartbeat, near-syncope, orthopnea, palpitations, paroxysmal nocturnal dyspnea and syncope.   Respiratory: Negative for cough, shortness of breath, sleep disturbances due to breathing, snoring, sputum production and wheezing.    Endocrine: Negative for cold intolerance and polyuria.   Hematologic/Lymphatic: Does not bruise/bleed easily.   Skin: Negative for rash.   Musculoskeletal: Positive for back pain. Negative for falls, joint pain, joint swelling and neck pain.   Gastrointestinal: Negative for abdominal pain, heartburn, nausea and vomiting.   Genitourinary: Negative for  dysuria, frequency and hematuria.   Neurological: Negative for difficulty with concentration, dizziness, focal weakness, light-headedness, numbness and seizures.   Psychiatric/Behavioral: Negative for depression, memory loss and substance abuse. The patient does not have insomnia.    Allergic/Immunologic: Negative for HIV exposure and hives.       Objective:   Physical Exam   Constitutional: He is oriented to person, place, and time. He appears well-nourished.   HENT:   Head: Normocephalic.   Eyes: Pupils are equal, round, and reactive to light.   Neck: Normal carotid pulses and no JVD present. Carotid bruit is not present. No thyromegaly present.   Cardiovascular: Normal heart sounds and normal pulses.  An irregularly irregular rhythm present.  No extrasystoles are present. Tachycardia present.  PMI is not displaced.  Exam reveals no gallop and no S3.    No murmur heard.  JVP > 15 cmH2O  +hepatojugular reflex     Pulmonary/Chest: Breath sounds normal. No stridor. No respiratory distress.   Abdominal: Soft. Bowel sounds are normal. There is no tenderness. There is no rebound.   Musculoskeletal: Normal range of motion.   B/l Leg dressed due to ulcer below the knees  Edema up to b/l thigh.     Neurological: He is alert and oriented to person, place, and time.   Skin: Skin is intact. No rash noted.   Psychiatric: His behavior is normal.       No results found for: CHOL  No results found for: HDL  No results found for: LDLCALC  No results found for: TRIG  No results found for: CHOLHDL    Chemistry        Component Value Date/Time     04/26/2015 0423    K 3.9 04/26/2015 0423     04/26/2015 0423    CO2 28 04/26/2015 0423    BUN 27 (H) 04/26/2015 0423    CREATININE 1.6 (H) 04/26/2015 0423    GLU 87 04/26/2015 0423        Component Value Date/Time    CALCIUM 8.6 (L) 04/26/2015 0423    ALKPHOS 65 04/21/2015 1443    AST 20 04/21/2015 1443    ALT 16 04/21/2015 1443    BILITOT 0.5 04/21/2015 1443          Lab  Results   Component Value Date    TSH 0.973 04/15/2013     Lab Results   Component Value Date    INR 1.1 04/21/2015     Lab Results   Component Value Date    WBC 6.72 04/22/2015    HGB 11.1 (L) 04/24/2015    HCT 34.1 (L) 04/24/2015    MCV 94 04/22/2015     04/22/2015     BMP  Sodium   Date Value Ref Range Status   04/26/2015 143 136 - 145 mmol/L Final     Potassium   Date Value Ref Range Status   04/26/2015 3.9 3.5 - 5.1 mmol/L Final     Chloride   Date Value Ref Range Status   04/26/2015 105 95 - 110 mmol/L Final     CO2   Date Value Ref Range Status   04/26/2015 28 23 - 29 mmol/L Final     BUN, Bld   Date Value Ref Range Status   04/26/2015 27 (H) 8 - 23 mg/dL Final     Creatinine   Date Value Ref Range Status   04/26/2015 1.6 (H) 0.5 - 1.4 mg/dL Final     Calcium   Date Value Ref Range Status   04/26/2015 8.6 (L) 8.7 - 10.5 mg/dL Final     Anion Gap   Date Value Ref Range Status   04/26/2015 10 8 - 16 mmol/L Final     eGFR if    Date Value Ref Range Status   04/26/2015 47 (A) >60 mL/min/1.73 m^2 Final     eGFR if non    Date Value Ref Range Status   04/26/2015 41 (A) >60 mL/min/1.73 m^2 Final     Comment:     Calculation used to obtain the estimated glomerular filtration  rate (eGFR) is the CKD-EPI equation. Since race is unknown   in our information system, the eGFR values for   -American and Non--American patients are given   for each creatinine result.       CrCl cannot be calculated (Patient has no serum creatinine result on file.).     Assessment:      1. Chronic atrial fibrillation    2. Acute on chronic diastolic congestive heart failure    3. CKD (chronic kidney disease) stage 3, GFR 30-59 ml/min      afib with RVR and CHF fluid overloaded.  BP is soft.  Cr 1.8 in ,  Not f/u with medicine over a year.    Plan:   Advise to go to ER for further evaluation and Rx.  Called ER   Pt's wife agreed to drive pt to ER at [place].

## 2017-03-17 LAB
ALBUMIN SERPL BCP-MCNC: 2.6 G/DL
ALP SERPL-CCNC: 82 U/L
ALT SERPL W/O P-5'-P-CCNC: 29 U/L
ANION GAP SERPL CALC-SCNC: 10 MMOL/L
AORTIC VALVE REGURGITATION: ABNORMAL
AST SERPL-CCNC: 43 U/L
BASOPHILS # BLD AUTO: 0.02 K/UL
BASOPHILS NFR BLD: 0.3 %
BILIRUB SERPL-MCNC: 1.1 MG/DL
BUN SERPL-MCNC: 33 MG/DL
CALCIUM SERPL-MCNC: 8 MG/DL
CHLORIDE SERPL-SCNC: 109 MMOL/L
CO2 SERPL-SCNC: 21 MMOL/L
CREAT SERPL-MCNC: 1.7 MG/DL
DIASTOLIC DYSFUNCTION: NO
DIFFERENTIAL METHOD: ABNORMAL
EOSINOPHIL # BLD AUTO: 0.1 K/UL
EOSINOPHIL NFR BLD: 2.3 %
ERYTHROCYTE [DISTWIDTH] IN BLOOD BY AUTOMATED COUNT: 15.6 %
EST. GFR  (AFRICAN AMERICAN): 44 ML/MIN/1.73 M^2
EST. GFR  (NON AFRICAN AMERICAN): 38 ML/MIN/1.73 M^2
ESTIMATED PA SYSTOLIC PRESSURE: 39.94
GLOBAL PERICARDIAL EFFUSION: ABNORMAL
GLUCOSE SERPL-MCNC: 78 MG/DL
HCT VFR BLD AUTO: 34.3 %
HGB BLD-MCNC: 10.9 G/DL
LYMPHOCYTES # BLD AUTO: 0.9 K/UL
LYMPHOCYTES NFR BLD: 14.3 %
MCH RBC QN AUTO: 29.9 PG
MCHC RBC AUTO-ENTMCNC: 31.8 %
MCV RBC AUTO: 94 FL
MITRAL VALVE REGURGITATION: ABNORMAL
MONOCYTES # BLD AUTO: 0.8 K/UL
MONOCYTES NFR BLD: 12.5 %
NEUTROPHILS # BLD AUTO: 4.4 K/UL
NEUTROPHILS NFR BLD: 70.6 %
PLATELET # BLD AUTO: 162 K/UL
PMV BLD AUTO: 10.6 FL
POTASSIUM SERPL-SCNC: 4.3 MMOL/L
PROT SERPL-MCNC: 5.9 G/DL
RBC # BLD AUTO: 3.65 M/UL
RETIRED EF AND QEF - SEE NOTES: 30 (ref 55–65)
SODIUM SERPL-SCNC: 140 MMOL/L
TROPONIN I SERPL DL<=0.01 NG/ML-MCNC: 0.04 NG/ML
WBC # BLD AUTO: 6.16 K/UL

## 2017-03-17 PROCEDURE — 93306 TTE W/DOPPLER COMPLETE: CPT | Mod: 26,,, | Performed by: INTERNAL MEDICINE

## 2017-03-17 PROCEDURE — 63600175 PHARM REV CODE 636 W HCPCS: Performed by: INTERNAL MEDICINE

## 2017-03-17 PROCEDURE — P9045 ALBUMIN (HUMAN), 5%, 250 ML: HCPCS | Performed by: INTERNAL MEDICINE

## 2017-03-17 PROCEDURE — 25000003 PHARM REV CODE 250: Performed by: INTERNAL MEDICINE

## 2017-03-17 PROCEDURE — 25000003 PHARM REV CODE 250: Performed by: PHYSICIAN ASSISTANT

## 2017-03-17 PROCEDURE — 25000003 PHARM REV CODE 250: Performed by: NURSE PRACTITIONER

## 2017-03-17 PROCEDURE — 99223 1ST HOSP IP/OBS HIGH 75: CPT | Mod: ,,, | Performed by: INTERNAL MEDICINE

## 2017-03-17 PROCEDURE — 21400001 HC TELEMETRY ROOM

## 2017-03-17 PROCEDURE — 84484 ASSAY OF TROPONIN QUANT: CPT

## 2017-03-17 PROCEDURE — 93306 TTE W/DOPPLER COMPLETE: CPT

## 2017-03-17 PROCEDURE — 63600175 PHARM REV CODE 636 W HCPCS: Performed by: EMERGENCY MEDICINE

## 2017-03-17 PROCEDURE — 85025 COMPLETE CBC W/AUTO DIFF WBC: CPT

## 2017-03-17 PROCEDURE — 80053 COMPREHEN METABOLIC PANEL: CPT

## 2017-03-17 RX ORDER — ALBUMIN HUMAN 50 G/1000ML
50 SOLUTION INTRAVENOUS ONCE
Status: COMPLETED | OUTPATIENT
Start: 2017-03-17 | End: 2017-03-17

## 2017-03-17 RX ORDER — METOPROLOL TARTRATE 25 MG/1
25 TABLET, FILM COATED ORAL EVERY 8 HOURS
Status: DISCONTINUED | OUTPATIENT
Start: 2017-03-17 | End: 2017-03-18

## 2017-03-17 RX ORDER — ALPRAZOLAM 0.25 MG/1
0.25 TABLET ORAL NIGHTLY PRN
Status: DISCONTINUED | OUTPATIENT
Start: 2017-03-18 | End: 2017-03-20 | Stop reason: HOSPADM

## 2017-03-17 RX ORDER — SODIUM CHLORIDE 9 MG/ML
1000 INJECTION, SOLUTION INTRAVENOUS
Status: COMPLETED | OUTPATIENT
Start: 2017-03-17 | End: 2017-03-17

## 2017-03-17 RX ORDER — METOPROLOL TARTRATE 1 MG/ML
5 INJECTION, SOLUTION INTRAVENOUS ONCE
Status: COMPLETED | OUTPATIENT
Start: 2017-03-18 | End: 2017-03-17

## 2017-03-17 RX ORDER — METOPROLOL TARTRATE 25 MG/1
25 TABLET, FILM COATED ORAL ONCE
Status: COMPLETED | OUTPATIENT
Start: 2017-03-17 | End: 2017-03-17

## 2017-03-17 RX ADMIN — SODIUM CHLORIDE 1000 ML: 0.9 INJECTION, SOLUTION INTRAVENOUS at 01:03

## 2017-03-17 RX ADMIN — METOPROLOL TARTRATE 25 MG: 25 TABLET ORAL at 01:03

## 2017-03-17 RX ADMIN — APIXABAN 2.5 MG: 2.5 TABLET, FILM COATED ORAL at 01:03

## 2017-03-17 RX ADMIN — METOPROLOL TARTRATE 5 MG: 5 INJECTION INTRAVENOUS at 11:03

## 2017-03-17 RX ADMIN — ALPRAZOLAM 0.25 MG: 0.25 TABLET ORAL at 11:03

## 2017-03-17 RX ADMIN — ASPIRIN 81 MG 81 MG: 81 TABLET ORAL at 08:03

## 2017-03-17 RX ADMIN — FUROSEMIDE 40 MG: 10 INJECTION, SOLUTION INTRAVENOUS at 11:03

## 2017-03-17 RX ADMIN — METOPROLOL TARTRATE 25 MG: 25 TABLET ORAL at 09:03

## 2017-03-17 RX ADMIN — APIXABAN 2.5 MG: 2.5 TABLET, FILM COATED ORAL at 08:03

## 2017-03-17 RX ADMIN — METOPROLOL TARTRATE 25 MG: 25 TABLET ORAL at 10:03

## 2017-03-17 RX ADMIN — ALBUMIN (HUMAN) 50 G: 12.5 SOLUTION INTRAVENOUS at 08:03

## 2017-03-17 NOTE — H&P
"[facility] Hospital Medicine  History & Physical    Patient Name: Yan Pickering Jr.  MRN: [id number]  Admission Date: 3/16/2017  Attending Physician: Dr. Cordero  Primary Care Provider: Miller Dinero MD         Patient information was obtained from patient and ER records.     Subjective:     Principal Problem:Atrial fibrillation with RVR    Chief Complaint:   Chief Complaint   Patient presents with    Atrial Fibrillation     with RVR, rate of 148, sent to ER by Dr. Lazaro; pt also c/o BLE edema and weeping leg wounds        HPI: Yan Pickering is a 76 year old male with history of AVR, chronic atrial fibrillation, and CKD who was seen in clinic today for worsening shortness and lower extremity swelling. He reports running out of all medications "some months" ago and failed to get them refilled. He reports weight gain of greater than 15lbs. Admits to palpitations and exertional dyspnea. Denies chest pain, fever, abdominal pain, and PND. EKG showed Afib with RVR and asked to report to ED for further management. He is followed by [facility] for venous stasis wounds.        Past Medical History:   Diagnosis Date    Atrial fibrillation     CKD (chronic kidney disease) stage 3, GFR 30-59 ml/min     Mild mitral insufficiency     Moderate aortic insufficiency     Venous stasis dermatitis of both lower extremities        No past surgical history on file.    Review of patient's allergies indicates:  No Known Allergies    No current facility-administered medications on file prior to encounter.      Current Outpatient Prescriptions on File Prior to Encounter   Medication Sig    furosemide (LASIX) 20 MG tablet Take 1 tablet (20 mg total) by mouth once daily.    metoprolol tartrate (LOPRESSOR) 50 MG tablet Take 1 tablet (50 mg total) by mouth 2 (two) times daily.    [DISCONTINUED] diltiazem (CARDIZEM CD) 120 MG Cp24 Take 1 capsule (120 mg total) by mouth once daily.    [DISCONTINUED] rivaroxaban (XARELTO) 15 mg " Tab Take 1 tablet (15 mg total) by mouth daily with dinner or evening meal.     Family History     None        Social History Main Topics    Smoking status: Never Smoker    Smokeless tobacco: Never Used    Alcohol use No    Drug use: No    Sexual activity: Not on file     Review of Systems   Constitutional: Negative for chills, diaphoresis, fatigue and fever.   HENT: Negative for drooling, ear pain, rhinorrhea and sore throat.    Eyes: Negative.    Respiratory: Positive for shortness of breath. Negative for cough and wheezing.    Cardiovascular: Positive for palpitations and leg swelling.   Gastrointestinal: Negative for abdominal pain, constipation, diarrhea and nausea.   Endocrine: Negative.    Genitourinary: Negative for dysuria, hematuria and urgency.   Musculoskeletal: Negative.    Skin: Negative for color change and wound.   Allergic/Immunologic: Negative.    Neurological: Negative for dizziness, syncope and speech difficulty.   Hematological: Negative.    Psychiatric/Behavioral: Negative.      Objective:     Vital Signs (Most Recent):  Temp: 98 °F (36.7 °C) (03/16/17 1524)  Pulse: (!) 122 (03/16/17 1901)  Resp: 19 (03/16/17 1901)  BP: 100/61 (03/16/17 1901)  SpO2: 98 % (03/16/17 1901) Vital Signs (24h Range):  Temp:  [98 °F (36.7 °C)] 98 °F (36.7 °C)  Pulse:  [118-153] 122  Resp:  [15-22] 19  SpO2:  [97 %-100 %] 98 %  BP: ()/(52-79) 100/61     Weight: 64.8 kg (142 lb 12.8 oz)  Body mass index is 21.09 kg/(m^2).    Physical Exam   Constitutional: He is oriented to person, place, and time. He appears well-developed and well-nourished. No distress.   HENT:   Head: Normocephalic and atraumatic.   Eyes: EOM are normal.   Neck: Normal range of motion. Neck supple.   Cardiovascular: irregularly irregular rate,tachycardic, and normal heart sounds.    Pulmonary/Chest: Effort normal. No respiratory distress. He has decreased breath sounds.   Abdominal: Soft. Bowel sounds are normal. He exhibits no  distension. There is no tenderness.   Musculoskeletal: Normal range of motion. He exhibits edema (+3 swelling extending to bilateral thighs).   Kyphotic posture   Neurological: He is alert and oriented to person, place, and time.   Skin: Skin is dry.   Compression dressing intact to bilateral lower extremities     Nursing note and vitals reviewed.       Significant Labs:   CBC:   Recent Labs  Lab 03/16/17  1540   WBC 8.40   HGB 11.7*   HCT 37.3*        CMP:   Recent Labs  Lab 03/16/17  1540      K 4.7      CO2 23   GLU 92   BUN 34*   CREATININE 1.8*   CALCIUM 8.7   PROT 6.8   ALBUMIN 3.1*   BILITOT 0.9   ALKPHOS 94   AST 29   ALT 27   ANIONGAP 9   EGFRNONAA 35*       Significant Imaging:   Imaging Results         X-Ray Chest 1 View (Final result) Result time:  03/16/17 16:02:56    Procedure changed from X-Ray Chest PA And Lateral        Final result by Judith Roman MD (03/16/17 16:02:56)    Impression:     Pleural effusions and cardiomegaly.          Electronically signed by: JUDITH ROMAN MD  Date:     03/16/17  Time:    16:02     Narrative:    Exam: XR CHEST 1 VIEW    Clinical History: Acute onset Chest Pain    Findings:     Small bilateral pleural effusions.  Mild cardiomegaly.  no acute infiltrate detected.                Assessment/Plan:     * Atrial fibrillation with RVR  -S/p IV Cardizem x 2  -oral Metoprolol (dose decrease given marginal hypotension)  -patient was previously on Xarelto and stopped; undecided on oral anticoagulation. Will give ASA for CVA prophylaxis.   -Cardiology consult      Moderate aortic insufficiency  Likely symptomatic after medications were stopped  -plan as above      CHF (congestive heart failure)  -last echocardiogram (4/2015) EF 60% without diastolic dysfunction  AI likely attributing  -repeat Echocardiogram  -Diurese  -Betablocker  -sodium and fluid restriction    CKD (chronic kidney disease) stage 3, GFR 30-59 ml/min  -kidney function at baseline  -Closely  monitor      Venous stasis dermatitis of both lower extremities  -diurese  -dressing changes  -leg elevation  -wound care consult  -patient follows [facility] for compression therapy    VTE Risk Mitigation         Ordered     enoxaparin injection 40 mg  Daily     Route:  Subcutaneous        03/16/17 1924     Medium Risk of VTE  Once      03/16/17 1924        Bree Stokes NP  Department of Hospital Medicine   [facility]

## 2017-03-17 NOTE — PLAN OF CARE
Problem: Patient Care Overview  Goal: Plan of Care Review  Outcome: Ongoing (interventions implemented as appropriate)  Patient remains free from falls, fall precaution in place. Assist x1.   VS stable. Denies pain. Wound care consulted and dressing changed.   No other C/O at this time.Call bell and belongings within reach, reminded to call for assistance.

## 2017-03-17 NOTE — ED NOTES
Pt resting comfortably in bed, NAD noted, RR even & unlabored. No complaints at this time. VSS. Denies need for assistance. Spouse at bedside. Call light within reach. Will continue to monitor.

## 2017-03-17 NOTE — SUBJECTIVE & OBJECTIVE
Past Medical History:   Diagnosis Date    Atrial fibrillation     CKD (chronic kidney disease) stage 3, GFR 30-59 ml/min     Mild mitral insufficiency     Moderate aortic insufficiency     Venous stasis dermatitis of both lower extremities        No past surgical history on file.    Review of patient's allergies indicates:  No Known Allergies    No current facility-administered medications on file prior to encounter.      Current Outpatient Prescriptions on File Prior to Encounter   Medication Sig    furosemide (LASIX) 20 MG tablet Take 1 tablet (20 mg total) by mouth once daily.    metoprolol tartrate (LOPRESSOR) 50 MG tablet Take 1 tablet (50 mg total) by mouth 2 (two) times daily.    [DISCONTINUED] diltiazem (CARDIZEM CD) 120 MG Cp24 Take 1 capsule (120 mg total) by mouth once daily.    [DISCONTINUED] rivaroxaban (XARELTO) 15 mg Tab Take 1 tablet (15 mg total) by mouth daily with dinner or evening meal.     Family History     None        Social History Main Topics    Smoking status: Never Smoker    Smokeless tobacco: Never Used    Alcohol use No    Drug use: No    Sexual activity: Not on file     Review of Systems   Constitutional: Negative for chills, diaphoresis, fatigue and fever.   HENT: Negative for drooling, ear pain, rhinorrhea and sore throat.    Eyes: Negative.    Respiratory: Positive for shortness of breath. Negative for cough and wheezing.    Cardiovascular: Positive for palpitations and leg swelling.   Gastrointestinal: Negative for abdominal pain, constipation, diarrhea and nausea.   Endocrine: Negative.    Genitourinary: Negative for dysuria, hematuria and urgency.   Musculoskeletal: Negative.    Skin: Negative for color change and wound.   Allergic/Immunologic: Negative.    Neurological: Negative for dizziness, syncope and speech difficulty.   Hematological: Negative.    Psychiatric/Behavioral: Negative.      Objective:     Vital Signs (Most Recent):  Temp: 98 °F (36.7 °C) (03/16/17  1524)  Pulse: (!) 122 (03/16/17 1901)  Resp: 19 (03/16/17 1901)  BP: 100/61 (03/16/17 1901)  SpO2: 98 % (03/16/17 1901) Vital Signs (24h Range):  Temp:  [98 °F (36.7 °C)] 98 °F (36.7 °C)  Pulse:  [118-153] 122  Resp:  [15-22] 19  SpO2:  [97 %-100 %] 98 %  BP: ()/(52-79) 100/61     Weight: 64.8 kg (142 lb 12.8 oz)  Body mass index is 21.09 kg/(m^2).    Physical Exam   Constitutional: He is oriented to person, place, and time. He appears well-developed and well-nourished. No distress.   HENT:   Head: Normocephalic and atraumatic.   Eyes: EOM are normal.   Neck: Normal range of motion. Neck supple.   Cardiovascular: Normal rate, regular rhythm and normal heart sounds.    Pulmonary/Chest: Effort normal. No respiratory distress. He has decreased breath sounds.   Abdominal: Soft. Bowel sounds are normal. He exhibits no distension. There is no tenderness.   Musculoskeletal: Normal range of motion. He exhibits edema (+3 swelling extending to bilateral thighs).   Kyphotic posture   Neurological: He is alert and oriented to person, place, and time.   Skin: Skin is dry.   Compression dressing intact to bilateral lower extremities     Nursing note and vitals reviewed.       Significant Labs:   CBC:   Recent Labs  Lab 03/16/17  1540   WBC 8.40   HGB 11.7*   HCT 37.3*        CMP:   Recent Labs  Lab 03/16/17  1540      K 4.7      CO2 23   GLU 92   BUN 34*   CREATININE 1.8*   CALCIUM 8.7   PROT 6.8   ALBUMIN 3.1*   BILITOT 0.9   ALKPHOS 94   AST 29   ALT 27   ANIONGAP 9   EGFRNONAA 35*       Significant Imaging:   Imaging Results         X-Ray Chest 1 View (Final result) Result time:  03/16/17 16:02:56    Procedure changed from X-Ray Chest PA And Lateral        Final result by Judith Roman MD (03/16/17 16:02:56)    Impression:     Pleural effusions and cardiomegaly.          Electronically signed by: JUDITH ROMAN MD  Date:     03/16/17  Time:    16:02     Narrative:    Exam: XR CHEST 1 VIEW    Clinical  History: Acute onset Chest Pain    Findings:     Small bilateral pleural effusions.  Mild cardiomegaly.  no acute infiltrate detected.

## 2017-03-17 NOTE — ASSESSMENT & PLAN NOTE
-S/p IV Cardizem x 2  -oral Metoprolol (dose decrease given marginal hypotension)  -patient was previously on Xarelto and stopped; undecided on oral anticoagulation. Will give ASA for CVA prophylaxis.   -Cardiology consult

## 2017-03-17 NOTE — PROGRESS NOTES
"[facility] Hospital Medicine  Progress Note    Patient Name: Yan Pickering Jr.  MRN: [id number]  Patient Class: IP- Inpatient   Admission Date: 3/16/2017  Length of Stay: 1 days  Attending Physician: Spike Cordero MD  Primary Care Provider: Miller Dinero MD        Subjective:     Principal Problem:Atrial fibrillation with RVR    HPI:  Yan Pickering is a 76 year old male with history of AVR, chronic atrial fibrillation, and CKD who was seen in clinic today for worsening shortness and lower extremity swelling. He reports running out of all medications "some months" ago and failed to get them refilled. He reports weight gain of greater than 15lbs. Admits to palpitations and exertional dyspnea. Denies chest pain, fever, abdominal pain, and PND. EKG showed Afib with RVR and asked to report to ED for further management. He is followed by [facility] for venous stasis wounds.        Hospital Course:  Patient was subsequently admitted for atrial fibrillation with RVR, symptomatic AR, and decompensated heart failure. CXR shows pleural effusions with cardiomegaly. BNP 2868. Troponin 0.033. Echocardiogram has been ordered. He received IV Cardizem x 2. Metoprolol resumed. Patient undecided on oral anticoagulation so ASA was started for CVA prophylaxis. Cardiology has been consulted.     Interval History: improved SOB    Review of Systems   Constitutional: Negative for chills, diaphoresis, fatigue and fever.   HENT: Negative for drooling, ear pain, rhinorrhea and sore throat.    Eyes: Negative.    Respiratory: Positive for shortness of breath. Negative for cough and wheezing.    Cardiovascular: Positive for palpitations and leg swelling.   Gastrointestinal: Negative for abdominal pain, constipation, diarrhea and nausea.   Endocrine: Negative.    Genitourinary: Negative for dysuria, hematuria and urgency.   Musculoskeletal: Negative.    Skin: Negative for color change and wound.   Allergic/Immunologic: Negative.  "   Neurological: Negative for dizziness, syncope and speech difficulty.   Hematological: Negative.    Psychiatric/Behavioral: Negative.      Objective:     Vital Signs (Most Recent):  Temp: 97.5 °F (36.4 °C) (03/17/17 1536)  Pulse: (!) 127 (03/17/17 1536)  Resp: 18 (03/17/17 1536)  BP: 100/63 (03/17/17 1536)  SpO2: (!) 94 % (03/17/17 1536) Vital Signs (24h Range):  Temp:  [97.5 °F (36.4 °C)-97.8 °F (36.6 °C)] 97.5 °F (36.4 °C)  Pulse:  [113-138] 127  Resp:  [15-24] 18  SpO2:  [92 %-100 %] 94 %  BP: ()/(45-88) 100/63     Weight: 64.8 kg (142 lb 12.8 oz)  Body mass index is 21.09 kg/(m^2).    Intake/Output Summary (Last 24 hours) at 03/17/17 1558  Last data filed at 03/17/17 0800   Gross per 24 hour   Intake                0 ml   Output             2045 ml   Net            -2045 ml      Physical Exam   Constitutional: He is oriented to person, place, and time. He appears well-developed and well-nourished. No distress.   HENT:   Head: Normocephalic and atraumatic.   Eyes: EOM are normal.   Neck: Normal range of motion. Neck supple.   Cardiovascular: Normal rate, regular rhythm and normal heart sounds.    Pulmonary/Chest: Effort normal. No respiratory distress. He has decreased breath sounds.   Abdominal: Soft. Bowel sounds are normal. He exhibits no distension. There is no tenderness.   Musculoskeletal: Normal range of motion. He exhibits edema (+3 swelling extending to bilateral thighs).   Kyphotic posture   Neurological: He is alert and oriented to person, place, and time.   Skin: Skin is dry.   Compression dressing intact to bilateral lower extremities     Nursing note and vitals reviewed.      Significant Labs:   BMP:   Recent Labs  Lab 03/17/17  0525   GLU 78      K 4.3      CO2 21*   BUN 33*   CREATININE 1.7*   CALCIUM 8.0*     CBC:   Recent Labs  Lab 03/16/17  1540 03/17/17  0525   WBC 8.40 6.16   HGB 11.7* 10.9*   HCT 37.3* 34.3*    162     CMP:   Recent Labs  Lab 03/16/17  1540  03/17/17  0525    140   K 4.7 4.3    109   CO2 23 21*   GLU 92 78   BUN 34* 33*   CREATININE 1.8* 1.7*   CALCIUM 8.7 8.0*   PROT 6.8 5.9*   ALBUMIN 3.1* 2.6*   BILITOT 0.9 1.1*   ALKPHOS 94 82   AST 29 43*   ALT 27 29   ANIONGAP 9 10   EGFRNONAA 35* 38*     Cardiac Markers:   Recent Labs  Lab 03/16/17  1540   BNP 2868*     Troponin:   Recent Labs  Lab 03/16/17  1540 03/16/17  1839 03/17/17  0056   TROPONINI 0.035* 0.033* 0.035*       Significant Imaging: I have reviewed all pertinent imaging results/findings within the past 24 hours.    Assessment/Plan:      * Atrial fibrillation with RVR  -cards on board. Lopressor increased  On elaquis      CKD (chronic kidney disease) stage 3, GFR 30-59 ml/min  -kidney function at baseline  -Closely monitor      Venous stasis dermatitis of both lower extremities  -diurese  -dressing changes  -leg elevation  -wound care consult  -patient follows [facility] for compression therapy    CHF (congestive heart failure)  -lEF of 30-35 % currently    -Diurese  -Betablocker  -sodium and fluid restriction    VTE Risk Mitigation         Ordered     apixaban tablet 2.5 mg  2 times daily     Route:  Oral        03/17/17 1216     Medium Risk of VTE  Once      03/16/17 1924          Cosme Davey MD  Department of Hospital Medicine   [facility]

## 2017-03-17 NOTE — CONSULTS
Compression wraps removed from BLE to reveal dry, flaky skin. BLE cleansed with easi cleanse bath wipes, dried, attractain cream applied, mepilex non bordered foam dressing applied to ulcerated area on posterior RLE, tubigrip stockings applied to BLE. Discussed assessment with MAR Bowles RN. See below for wound care nurse recommendations.    BLE Skin Care :  1. Cleanse with easi cleanse bath wipes  2. Pat dry  3. Apply sween 24 cream  4. Cover RLE ulcer with mepilex non bordered foam  5. Apply tubigrip single stockings

## 2017-03-17 NOTE — ED NOTES
Spoke with Dr. quintero in regards to pt's BP. Telephone order for NS 1 L bolus. Read back & verified.

## 2017-03-17 NOTE — SUBJECTIVE & OBJECTIVE
Interval History: improved SOB    Review of Systems   Constitutional: Negative for chills, diaphoresis, fatigue and fever.   HENT: Negative for drooling, ear pain, rhinorrhea and sore throat.    Eyes: Negative.    Respiratory: Positive for shortness of breath. Negative for cough and wheezing.    Cardiovascular: Positive for palpitations and leg swelling.   Gastrointestinal: Negative for abdominal pain, constipation, diarrhea and nausea.   Endocrine: Negative.    Genitourinary: Negative for dysuria, hematuria and urgency.   Musculoskeletal: Negative.    Skin: Negative for color change and wound.   Allergic/Immunologic: Negative.    Neurological: Negative for dizziness, syncope and speech difficulty.   Hematological: Negative.    Psychiatric/Behavioral: Negative.      Objective:     Vital Signs (Most Recent):  Temp: 97.5 °F (36.4 °C) (03/17/17 1536)  Pulse: (!) 127 (03/17/17 1536)  Resp: 18 (03/17/17 1536)  BP: 100/63 (03/17/17 1536)  SpO2: (!) 94 % (03/17/17 1536) Vital Signs (24h Range):  Temp:  [97.5 °F (36.4 °C)-97.8 °F (36.6 °C)] 97.5 °F (36.4 °C)  Pulse:  [113-138] 127  Resp:  [15-24] 18  SpO2:  [92 %-100 %] 94 %  BP: ()/(45-88) 100/63     Weight: 64.8 kg (142 lb 12.8 oz)  Body mass index is 21.09 kg/(m^2).    Intake/Output Summary (Last 24 hours) at 03/17/17 1558  Last data filed at 03/17/17 0800   Gross per 24 hour   Intake                0 ml   Output             2045 ml   Net            -2045 ml      Physical Exam   Constitutional: He is oriented to person, place, and time. He appears well-developed and well-nourished. No distress.   HENT:   Head: Normocephalic and atraumatic.   Eyes: EOM are normal.   Neck: Normal range of motion. Neck supple.   Cardiovascular: Normal rate, regular rhythm and normal heart sounds.    Pulmonary/Chest: Effort normal. No respiratory distress. He has decreased breath sounds.   Abdominal: Soft. Bowel sounds are normal. He exhibits no distension. There is no tenderness.    Musculoskeletal: Normal range of motion. He exhibits edema (+3 swelling extending to bilateral thighs).   Kyphotic posture   Neurological: He is alert and oriented to person, place, and time.   Skin: Skin is dry.   Compression dressing intact to bilateral lower extremities     Nursing note and vitals reviewed.      Significant Labs:   BMP:   Recent Labs  Lab 03/17/17  0525   GLU 78      K 4.3      CO2 21*   BUN 33*   CREATININE 1.7*   CALCIUM 8.0*     CBC:   Recent Labs  Lab 03/16/17  1540 03/17/17  0525   WBC 8.40 6.16   HGB 11.7* 10.9*   HCT 37.3* 34.3*    162     CMP:   Recent Labs  Lab 03/16/17  1540 03/17/17  0525    140   K 4.7 4.3    109   CO2 23 21*   GLU 92 78   BUN 34* 33*   CREATININE 1.8* 1.7*   CALCIUM 8.7 8.0*   PROT 6.8 5.9*   ALBUMIN 3.1* 2.6*   BILITOT 0.9 1.1*   ALKPHOS 94 82   AST 29 43*   ALT 27 29   ANIONGAP 9 10   EGFRNONAA 35* 38*     Cardiac Markers:   Recent Labs  Lab 03/16/17  1540   BNP 2868*     Troponin:   Recent Labs  Lab 03/16/17  1540 03/16/17  1839 03/17/17  0056   TROPONINI 0.035* 0.033* 0.035*       Significant Imaging: I have reviewed all pertinent imaging results/findings within the past 24 hours.

## 2017-03-17 NOTE — ASSESSMENT & PLAN NOTE
-last echocardiogram (4/2015) EF 60% without diastolic dysfunction  AI likely attributing  -repeat Echocardiogram  -Diurese  -Betablocker  -sodium and fluid restriction

## 2017-03-17 NOTE — CONSULTS
Consult Note  Cardiology    Consult Requested By: OMAR Lopez  Reason for Consult: CHF, afib with RVR    SUBJECTIVE:     History of Present Illness:  Patient is a 76 y.o. male with a PMHx of atrial fibrillation and CKD who was sent to [facility] ED yesterday after being seen in cardiology clinic. Patient complained of progressively worsening SOB over the past few weeks. Associated symptoms included palpitations, lower extremity edema with weeping leg wounds, and a 15 pound weight gain. Patient denied any associated luis eduardo chest pain, fever, or chills. Stated he stopped taking his medications in 2016. Initial workup in ED revealed BNP of 2868 and initial troponin of 0.035. CXR showed vascular congestion and bilateral pleural effusions and EKG showed afib with RVR (rate > 120) and patient was subsequently admitted for further evaluation and treatment. Patient seen and examined today, while in ED, still complains of SOB. Denies any chest pain or chest tightness. Reports he tries to be compliant with fluid and salt restriction at home. Last seen by Dr. Velez in 2015. Chart reviewed. Repeat troponin remains flat, 0.033, 0.035. 2D echo pending.     Review of patient's allergies indicates:  No Known Allergies    Past Medical History:   Diagnosis Date    Atrial fibrillation     CKD (chronic kidney disease) stage 3, GFR 30-59 ml/min     Mild mitral insufficiency     Moderate aortic insufficiency     Venous stasis dermatitis of both lower extremities      No past surgical history on file.  No family history on file.  Social History   Substance Use Topics    Smoking status: Never Smoker    Smokeless tobacco: Never Used    Alcohol use No        Review of Systems:  Constitutional: +weight gain  Eyes: negative  Ears, nose, mouth, throat, and face: negative  Respiratory: +SOB  Cardiovascular: +palpitations, LUNA, lower extremity edema with weeping wounds  Gastrointestinal:  negative  Genitourinary:negative  Hematologic/lymphatic: negative  Musculoskeletal:negative,   Neurological: negative  Behavioral/Psych: negative  Endocrine: negative  Allergic/Immunologic: negative    OBJECTIVE:     Vital Signs (Most Recent)  Temp: 97.8 °F (36.6 °C) (03/16/17 2221)  Pulse: (!) 128 (03/17/17 1121)  Resp: 19 (03/17/17 1121)  BP: 99/60 (03/17/17 1121)  SpO2: 98 % (03/17/17 1121)    Vital Signs Range (Last 24H):  Temp:  [97.8 °F (36.6 °C)-98 °F (36.7 °C)]   Pulse:  [113-153]   Resp:  [15-24]   BP: ()/(45-88)   SpO2:  [92 %-100 %]     Current Facility-Administered Medications   Medication    aspirin chewable tablet 81 mg    enoxaparin injection 30 mg    furosemide injection 40 mg    metoprolol tartrate (LOPRESSOR) tablet 25 mg    ondansetron injection 4 mg     Current Outpatient Prescriptions   Medication Sig    furosemide (LASIX) 20 MG tablet Take 1 tablet (20 mg total) by mouth once daily.    metoprolol tartrate (LOPRESSOR) 50 MG tablet Take 1 tablet (50 mg total) by mouth 2 (two) times daily.     No current facility-administered medications on file prior to encounter.      Current Outpatient Prescriptions on File Prior to Encounter   Medication Sig    furosemide (LASIX) 20 MG tablet Take 1 tablet (20 mg total) by mouth once daily.    metoprolol tartrate (LOPRESSOR) 50 MG tablet Take 1 tablet (50 mg total) by mouth 2 (two) times daily.       Physical Exam:  General appearance: alert, appears stated age and cooperative  Head: Normocephalic, without obvious abnormality, atraumatic  Neck: no adenopathy, no carotid bruit, +JVD  Lungs:  Diminished at bases with rales  Chest wall: no tenderness  Heart: irregularly irregular rate and rhythm, tachycardic, S1, S2 normal, +MR murmur  Abdomen: soft, non-tender  Extremities: extremities normal, atraumatic, bilateral legs wrapped with coband  Skin: Skin color, texture, turgor normal. No rashes or lesions  Neurologic: Grossly normal, AAO x  3  Laboratory:  Chemistry:   Lab Results   Component Value Date     03/17/2017    K 4.3 03/17/2017     03/17/2017    CO2 21 (L) 03/17/2017    BUN 33 (H) 03/17/2017    CREATININE 1.7 (H) 03/17/2017    CALCIUM 8.0 (L) 03/17/2017     Cardiac Markers:   Lab Results   Component Value Date    TROPONINI 0.035 (H) 03/17/2017     Cardiac Markers (Last 3):   Lab Results   Component Value Date    TROPONINI 0.035 (H) 03/17/2017    TROPONINI 0.033 (H) 03/16/2017    TROPONINI 0.035 (H) 03/16/2017     CBC:   Lab Results   Component Value Date    WBC 6.16 03/17/2017    HGB 10.9 (L) 03/17/2017    HCT 34.3 (L) 03/17/2017    MCV 94 03/17/2017     03/17/2017     Lipids: No results found for: CHOL, TRIG, HDL, LDLDIRECT  Coagulation:   Lab Results   Component Value Date    INR 1.2 03/16/2017    APTT 28.0 03/16/2017       Diagnostic Results:  ECG: Reviewed  X-Ray: Reviewed  Echo: Pending      ASSESSMENT/PLAN:     Patient Active Problem List   Diagnosis    Atrial fibrillation with RVR    CKD (chronic kidney disease) stage 3, GFR 30-59 ml/min    Venous stasis dermatitis of both lower extremities    Moderate aortic insufficiency    Mild mitral insufficiency    CHF (congestive heart failure)      Patient who presents with decompensated CHF in setting of afib with RVR, secondary to medication non-compliance. Needs IV diuresis and rate control. Increase lopressor to 25 mg q 8 hours. Start Eliquis for CVA prophylaxis (patient with history of nosebleeds on Xarelto). Check 2D echo.   Plan:   -Increase lopressor to 25 mg q 8 hours  -Start Eliquis 2.5 mg BID  -IV Lasix  -Check 2D echo  -Further rec's to follow    Chart reviewed. Dr. Dodge examined patient and agrees with plan as outlined above.

## 2017-03-17 NOTE — ED NOTES
Pt lying in bed in NAD,VSS,RR equal and unlabored. Bed is low, locked, and call light in reach. Side rails up x 2. No change in patient's status. Will continue to monitor.

## 2017-03-17 NOTE — ASSESSMENT & PLAN NOTE
-diurese  -dressing changes  -leg elevation  -wound care consult  -patient follows [facility] for compression therapy

## 2017-03-17 NOTE — ED NOTES
No change in status. Pt resting comfortably in bed, NAD noted, RR even &  Unlabored. No complaints. Call light within reach. Will continue to monitor.

## 2017-03-18 LAB
ALBUMIN SERPL BCP-MCNC: 3.2 G/DL
ALP SERPL-CCNC: 84 U/L
ALT SERPL W/O P-5'-P-CCNC: 38 U/L
ANION GAP SERPL CALC-SCNC: 9 MMOL/L
AST SERPL-CCNC: 56 U/L
BASOPHILS # BLD AUTO: 0.02 K/UL
BASOPHILS NFR BLD: 0.3 %
BILIRUB SERPL-MCNC: 0.8 MG/DL
BUN SERPL-MCNC: 39 MG/DL
CALCIUM SERPL-MCNC: 8.1 MG/DL
CHLORIDE SERPL-SCNC: 108 MMOL/L
CO2 SERPL-SCNC: 23 MMOL/L
CREAT SERPL-MCNC: 1.9 MG/DL
DIFFERENTIAL METHOD: ABNORMAL
EOSINOPHIL # BLD AUTO: 0 K/UL
EOSINOPHIL NFR BLD: 0.6 %
ERYTHROCYTE [DISTWIDTH] IN BLOOD BY AUTOMATED COUNT: 15.5 %
EST. GFR  (AFRICAN AMERICAN): 38 ML/MIN/1.73 M^2
EST. GFR  (NON AFRICAN AMERICAN): 33 ML/MIN/1.73 M^2
GLUCOSE SERPL-MCNC: 96 MG/DL
HCT VFR BLD AUTO: 35.4 %
HGB BLD-MCNC: 11 G/DL
LYMPHOCYTES # BLD AUTO: 0.8 K/UL
LYMPHOCYTES NFR BLD: 11.8 %
MCH RBC QN AUTO: 29.5 PG
MCHC RBC AUTO-ENTMCNC: 31.1 %
MCV RBC AUTO: 95 FL
MONOCYTES # BLD AUTO: 0.7 K/UL
MONOCYTES NFR BLD: 10.1 %
NEUTROPHILS # BLD AUTO: 5.3 K/UL
NEUTROPHILS NFR BLD: 77.2 %
PLATELET # BLD AUTO: 172 K/UL
PMV BLD AUTO: 10.4 FL
POTASSIUM SERPL-SCNC: 4.5 MMOL/L
PROT SERPL-MCNC: 5.9 G/DL
RBC # BLD AUTO: 3.73 M/UL
SODIUM SERPL-SCNC: 140 MMOL/L
WBC # BLD AUTO: 6.85 K/UL

## 2017-03-18 PROCEDURE — 36415 COLL VENOUS BLD VENIPUNCTURE: CPT

## 2017-03-18 PROCEDURE — 80053 COMPREHEN METABOLIC PANEL: CPT

## 2017-03-18 PROCEDURE — 63600175 PHARM REV CODE 636 W HCPCS: Performed by: EMERGENCY MEDICINE

## 2017-03-18 PROCEDURE — 85025 COMPLETE CBC W/AUTO DIFF WBC: CPT

## 2017-03-18 PROCEDURE — 21400001 HC TELEMETRY ROOM

## 2017-03-18 PROCEDURE — 25000003 PHARM REV CODE 250: Performed by: PHYSICIAN ASSISTANT

## 2017-03-18 PROCEDURE — 25000003 PHARM REV CODE 250: Performed by: INTERNAL MEDICINE

## 2017-03-18 PROCEDURE — 99232 SBSQ HOSP IP/OBS MODERATE 35: CPT | Mod: ,,, | Performed by: INTERNAL MEDICINE

## 2017-03-18 PROCEDURE — 25000003 PHARM REV CODE 250: Performed by: NURSE PRACTITIONER

## 2017-03-18 RX ORDER — LOSARTAN POTASSIUM 25 MG/1
25 TABLET ORAL DAILY
Status: DISCONTINUED | OUTPATIENT
Start: 2017-03-18 | End: 2017-03-20 | Stop reason: HOSPADM

## 2017-03-18 RX ORDER — METOPROLOL TARTRATE 50 MG/1
50 TABLET ORAL EVERY 8 HOURS
Status: DISCONTINUED | OUTPATIENT
Start: 2017-03-18 | End: 2017-03-20 | Stop reason: HOSPADM

## 2017-03-18 RX ADMIN — FUROSEMIDE 40 MG: 10 INJECTION, SOLUTION INTRAVENOUS at 05:03

## 2017-03-18 RX ADMIN — ASPIRIN 81 MG 81 MG: 81 TABLET ORAL at 08:03

## 2017-03-18 RX ADMIN — LOSARTAN POTASSIUM 25 MG: 25 TABLET, FILM COATED ORAL at 02:03

## 2017-03-18 RX ADMIN — METOPROLOL TARTRATE 50 MG: 50 TABLET ORAL at 09:03

## 2017-03-18 RX ADMIN — METOPROLOL TARTRATE 25 MG: 25 TABLET ORAL at 06:03

## 2017-03-18 RX ADMIN — APIXABAN 2.5 MG: 2.5 TABLET, FILM COATED ORAL at 08:03

## 2017-03-18 RX ADMIN — METOPROLOL TARTRATE 50 MG: 50 TABLET ORAL at 02:03

## 2017-03-18 RX ADMIN — FUROSEMIDE 40 MG: 10 INJECTION, SOLUTION INTRAVENOUS at 08:03

## 2017-03-18 RX ADMIN — APIXABAN 2.5 MG: 2.5 TABLET, FILM COATED ORAL at 09:03

## 2017-03-18 NOTE — PROGRESS NOTES
Cardiology Progress Note        SUBJECTIVE:     History of Present Illness:  Patient is a 77 y.o. male presents with afib rvr uncontrolled chf acute systolic due to non compliance with medical therapy. The patient is feeling better however is weak to standup and ambulate..      OBJECTIVE:     Vital Signs (Most Recent)  Temp: 97.6 °F (36.4 °C) (03/18/17 1132)  Pulse: (!) 112 (03/18/17 1132)  Resp: 18 (03/18/17 1132)  BP: 116/69 (03/18/17 1132)  SpO2: 97 % (03/18/17 1132)    Vital Signs Range (Last 24H):  Temp:  [97.5 °F (36.4 °C)-97.6 °F (36.4 °C)]   Pulse:  [112-133]   Resp:  [18-20]   BP: (100-117)/(63-85)   SpO2:  [93 %-97 %]     Intake/Output last 3 shifts:  I/O last 3 completed shifts:  In: -   Out: 2045 [Urine:2045]    Intake/Output this shift:  I/O this shift:  In: -   Out: 750 [Urine:750]    Review of patient's allergies indicates:  No Known Allergies    Current Facility-Administered Medications   Medication    alprazolam tablet 0.25 mg    apixaban tablet 2.5 mg    aspirin chewable tablet 81 mg    furosemide injection 40 mg    metoprolol tartrate (LOPRESSOR) tablet 50 mg    ondansetron injection 4 mg     No current facility-administered medications on file prior to encounter.      Current Outpatient Prescriptions on File Prior to Encounter   Medication Sig    furosemide (LASIX) 20 MG tablet Take 1 tablet (20 mg total) by mouth once daily.    metoprolol tartrate (LOPRESSOR) 50 MG tablet Take 1 tablet (50 mg total) by mouth 2 (two) times daily.       Physical Exam:  General appearance: alert, appears stated age and cooperative  Head: Normocephalic, without obvious abnormality, atraumatic  Neck: no adenopathy, no carotid bruit, no JVD, supple, symmetrical, trachea midline and thyroid not enlarged, symmetric, no tenderness/mass/nodules  Lungs:  clear to auscultation bilaterally  Chest wall: no tenderness  Heart: irregularily irregular  rate and rhythm, S1, S2 normal, no murmur, click, rub or  gallop  Abdomen: soft, non-tender; bowel sounds normal; no masses,  no organomegaly  Extremities: extremities normal, atraumatic, no cyanosis or edema  Pulses: 2+ and symmetric  Skin: Skin color, texture, turgor normal. No rashes or lesions  Neurologic: Grossly normal    Laboratory:  Chemistry:   Lab Results   Component Value Date     03/18/2017    K 4.5 03/18/2017     03/18/2017    CO2 23 03/18/2017    BUN 39 (H) 03/18/2017    CREATININE 1.9 (H) 03/18/2017    CALCIUM 8.1 (L) 03/18/2017     Cardiac Markers:   Lab Results   Component Value Date    TROPONINI 0.035 (H) 03/17/2017     Cardiac Markers (Last 3):   Lab Results   Component Value Date    TROPONINI 0.035 (H) 03/17/2017    TROPONINI 0.033 (H) 03/16/2017    TROPONINI 0.035 (H) 03/16/2017     CBC:   Lab Results   Component Value Date    WBC 6.85 03/18/2017    HGB 11.0 (L) 03/18/2017    HCT 35.4 (L) 03/18/2017    MCV 95 03/18/2017     03/18/2017     Lipids: No results found for: CHOL, TRIG, HDL, LDLDIRECT  Coagulation:   Lab Results   Component Value Date    INR 1.2 03/16/2017    APTT 28.0 03/16/2017       Diagnostic Results:  ECG: Reviewed  X-Ray: Reviewed  US: Reviewed  CT: Reviewed  Echo: Reviewed      ASSESSMENT/PLAN:     Patient Active Problem List   Diagnosis    Atrial fibrillation with RVR    CKD (chronic kidney disease) stage 3, GFR 30-59 ml/min    Venous stasis dermatitis of both lower extremities    Moderate aortic insufficiency    Mild mitral insufficiency    CHF (congestive heart failure)      Patient has a drop in ef which is mostlikely secondary to afib and non compliance with medical therapy.he will need afterload therapy with arb. He is improved clinically will get cardiolite to r/o any cad( doubt.)  Plan:   Pharmacologic cardiolite  Losartan 25 mg po daily.

## 2017-03-18 NOTE — SUBJECTIVE & OBJECTIVE
Interval History: diuresed well. Continues to have minor increased rate    Review of Systems   Constitutional: Negative for chills, diaphoresis, fatigue and fever.   HENT: Negative for drooling, ear pain, rhinorrhea and sore throat.    Eyes: Negative.    Respiratory: Positive for shortness of breath. Negative for cough and wheezing.    Cardiovascular: Positive for palpitations and leg swelling.   Gastrointestinal: Negative for abdominal pain, constipation, diarrhea and nausea.   Endocrine: Negative.    Genitourinary: Negative for dysuria, hematuria and urgency.   Musculoskeletal: Negative.    Skin: Negative for color change and wound.   Allergic/Immunologic: Negative.    Neurological: Negative for dizziness, syncope and speech difficulty.   Hematological: Negative.    Psychiatric/Behavioral: Negative.      Objective:     Vital Signs (Most Recent):  Temp: 97.6 °F (36.4 °C) (03/18/17 1132)  Pulse: (!) 112 (03/18/17 1132)  Resp: 18 (03/18/17 1132)  BP: 116/69 (03/18/17 1132)  SpO2: 97 % (03/18/17 1132) Vital Signs (24h Range):  Temp:  [97.5 °F (36.4 °C)-97.6 °F (36.4 °C)] 97.6 °F (36.4 °C)  Pulse:  [112-133] 112  Resp:  [18-20] 18  SpO2:  [93 %-97 %] 97 %  BP: (100-117)/(63-85) 116/69     Weight: 64.8 kg (142 lb 12.8 oz)  Body mass index is 21.09 kg/(m^2).    Intake/Output Summary (Last 24 hours) at 03/18/17 1308  Last data filed at 03/18/17 1133   Gross per 24 hour   Intake                0 ml   Output              750 ml   Net             -750 ml      Physical Exam   Constitutional: He is oriented to person, place, and time. He appears well-developed and well-nourished. No distress.   HENT:   Head: Normocephalic and atraumatic.   Eyes: EOM are normal.   Neck: Normal range of motion. Neck supple.   Cardiovascular:   irreg irreg  tachy   Pulmonary/Chest: Effort normal. No respiratory distress. He has decreased breath sounds.   Abdominal: Soft. Bowel sounds are normal. He exhibits no distension. There is no tenderness.    Musculoskeletal: Normal range of motion. Edema: +2 pitting edema to the thighs.   Neurological: He is alert and oriented to person, place, and time.   Skin: Skin is dry.   Compression dressing intact to bilateral lower extremities     Nursing note and vitals reviewed.      Significant Labs:   BMP:   Recent Labs  Lab 03/18/17  0709   GLU 96      K 4.5      CO2 23   BUN 39*   CREATININE 1.9*   CALCIUM 8.1*     CBC:   Recent Labs  Lab 03/16/17  1540 03/17/17  0525 03/18/17  0709   WBC 8.40 6.16 6.85   HGB 11.7* 10.9* 11.0*   HCT 37.3* 34.3* 35.4*    162 172     CMP:   Recent Labs  Lab 03/16/17  1540 03/17/17  0525 03/18/17  0709    140 140   K 4.7 4.3 4.5    109 108   CO2 23 21* 23   GLU 92 78 96   BUN 34* 33* 39*   CREATININE 1.8* 1.7* 1.9*   CALCIUM 8.7 8.0* 8.1*   PROT 6.8 5.9* 5.9*   ALBUMIN 3.1* 2.6* 3.2*   BILITOT 0.9 1.1* 0.8   ALKPHOS 94 82 84   AST 29 43* 56*   ALT 27 29 38   ANIONGAP 9 10 9   EGFRNONAA 35* 38* 33*     Cardiac Markers:   Recent Labs  Lab 03/16/17  1540   BNP 2868*     Coagulation:   Recent Labs  Lab 03/16/17  1540   INR 1.2   APTT 28.0     Lactic Acid: No results for input(s): LACTATE in the last 48 hours.    Significant Imaging: I have reviewed all pertinent imaging results/findings within the past 24 hours.

## 2017-03-18 NOTE — ASSESSMENT & PLAN NOTE
-EF of 30-35 % currently    - contiue to diurese  - increased BB   - started on ARB  - cards to stress

## 2017-03-18 NOTE — PROGRESS NOTES
"[facility] Hospital Medicine  Progress Note    Patient Name: Yan Pickering Jr.  MRN: [id number]  Patient Class: IP- Inpatient   Admission Date: 3/16/2017  Length of Stay: 2 days  Attending Physician: Cosme Davey MD  Primary Care Provider: Miller Dinero MD        Subjective:     Principal Problem:Atrial fibrillation with RVR    HPI:  Yan Pickering is a 76 year old male with history of AVR, chronic atrial fibrillation, and CKD who was seen in clinic today for worsening shortness and lower extremity swelling. He reports running out of all medications "some months" ago and failed to get them refilled. He reports weight gain of greater than 15lbs. Admits to palpitations and exertional dyspnea. Denies chest pain, fever, abdominal pain, and PND. EKG showed Afib with RVR and asked to report to ED for further management. He is followed by [facility] for venous stasis wounds.        Hospital Course:  Patient was subsequently admitted for atrial fibrillation with RVR, symptomatic AR, and decompensated heart failure. CXR shows pleural effusions with cardiomegaly. BNP 2868. Troponin 0.033. Echocardiogram has been ordered. He received IV Cardizem x 2. Metoprolol resumed. Patient undecided on oral anticoagulation so ASA was started for CVA prophylaxis. Cardiology has been consulted.     Pt diuresising well - Echo with cardiomyopathy- will likely need a stress/cath- cards on board      Interval History: diuresed well. Continues to have minor increased rate    Review of Systems   Constitutional: Negative for chills, diaphoresis, fatigue and fever.   HENT: Negative for drooling, ear pain, rhinorrhea and sore throat.    Eyes: Negative.    Respiratory: Positive for shortness of breath. Negative for cough and wheezing.    Cardiovascular: Positive for palpitations and leg swelling.   Gastrointestinal: Negative for abdominal pain, constipation, diarrhea and nausea.   Endocrine: Negative.    Genitourinary: Negative for " dysuria, hematuria and urgency.   Musculoskeletal: Negative.    Skin: Negative for color change and wound.   Allergic/Immunologic: Negative.    Neurological: Negative for dizziness, syncope and speech difficulty.   Hematological: Negative.    Psychiatric/Behavioral: Negative.      Objective:     Vital Signs (Most Recent):  Temp: 97.6 °F (36.4 °C) (03/18/17 1132)  Pulse: (!) 112 (03/18/17 1132)  Resp: 18 (03/18/17 1132)  BP: 116/69 (03/18/17 1132)  SpO2: 97 % (03/18/17 1132) Vital Signs (24h Range):  Temp:  [97.5 °F (36.4 °C)-97.6 °F (36.4 °C)] 97.6 °F (36.4 °C)  Pulse:  [112-133] 112  Resp:  [18-20] 18  SpO2:  [93 %-97 %] 97 %  BP: (100-117)/(63-85) 116/69     Weight: 64.8 kg (142 lb 12.8 oz)  Body mass index is 21.09 kg/(m^2).    Intake/Output Summary (Last 24 hours) at 03/18/17 1308  Last data filed at 03/18/17 1133   Gross per 24 hour   Intake                0 ml   Output              750 ml   Net             -750 ml      Physical Exam   Constitutional: He is oriented to person, place, and time. He appears well-developed and well-nourished. No distress.   HENT:   Head: Normocephalic and atraumatic.   Eyes: EOM are normal.   Neck: Normal range of motion. Neck supple.   Cardiovascular:   irreg irreg  tachy   Pulmonary/Chest: Effort normal. No respiratory distress. He has decreased breath sounds.   Abdominal: Soft. Bowel sounds are normal. He exhibits no distension. There is no tenderness.   Musculoskeletal: Normal range of motion. Edema: +2 pitting edema to the thighs.   Neurological: He is alert and oriented to person, place, and time.   Skin: Skin is dry.   Compression dressing intact to bilateral lower extremities     Nursing note and vitals reviewed.      Significant Labs:   BMP:   Recent Labs  Lab 03/18/17  0709   GLU 96      K 4.5      CO2 23   BUN 39*   CREATININE 1.9*   CALCIUM 8.1*     CBC:   Recent Labs  Lab 03/16/17  1540 03/17/17  0525 03/18/17  0709   WBC 8.40 6.16 6.85   HGB 11.7* 10.9*  11.0*   HCT 37.3* 34.3* 35.4*    162 172     CMP:   Recent Labs  Lab 03/16/17  1540 03/17/17  0525 03/18/17  0709    140 140   K 4.7 4.3 4.5    109 108   CO2 23 21* 23   GLU 92 78 96   BUN 34* 33* 39*   CREATININE 1.8* 1.7* 1.9*   CALCIUM 8.7 8.0* 8.1*   PROT 6.8 5.9* 5.9*   ALBUMIN 3.1* 2.6* 3.2*   BILITOT 0.9 1.1* 0.8   ALKPHOS 94 82 84   AST 29 43* 56*   ALT 27 29 38   ANIONGAP 9 10 9   EGFRNONAA 35* 38* 33*     Cardiac Markers:   Recent Labs  Lab 03/16/17  1540   BNP 2868*     Coagulation:   Recent Labs  Lab 03/16/17  1540   INR 1.2   APTT 28.0     Lactic Acid: No results for input(s): LACTATE in the last 48 hours.    Significant Imaging: I have reviewed all pertinent imaging results/findings within the past 24 hours.    Assessment/Plan:      * Atrial fibrillation with RVR  -cards on board. Lopressor increased again  On elaquis        CKD (chronic kidney disease) stage 3, GFR 30-59 ml/min  -kidney function at baseline  -Closely monitor      Venous stasis dermatitis of both lower extremities  -diurese  -dressing changes  -leg elevation  -wound care consult  -patient follows [facility] for compression therapy    CHF (congestive heart failure)  -EF of 30-35 % currently    - contiue to diurese  - increased BB   - started on ARB  - cards to stress    VTE Risk Mitigation         Ordered     apixaban tablet 2.5 mg  2 times daily     Route:  Oral        03/17/17 1216     Medium Risk of VTE  Once      03/16/17 1924          Cosme Davey MD  Department of Hospital Medicine   [facility]

## 2017-03-18 NOTE — PROGRESS NOTES
Pt HR afib 130's and agitated notified Kike BUTTS new orders given. Ok to give Lasix now (post albumin) per Kike BUTTS

## 2017-03-18 NOTE — PLAN OF CARE
Problem: Patient Care Overview  Goal: Plan of Care Review  Outcome: Ongoing (interventions implemented as appropriate)  Patient remains free from falls, fall precaution in place. Stand by assist.   VS stable. Denies pain. Diuresing.   No other C/O at this time.Call bell and belongings within reach, reminded to call for assistance.

## 2017-03-19 LAB
ALBUMIN SERPL BCP-MCNC: 3 G/DL
ALP SERPL-CCNC: 85 U/L
ALT SERPL W/O P-5'-P-CCNC: 30 U/L
ANION GAP SERPL CALC-SCNC: 10 MMOL/L
AST SERPL-CCNC: 30 U/L
BASOPHILS # BLD AUTO: 0.05 K/UL
BASOPHILS NFR BLD: 0.6 %
BILIRUB SERPL-MCNC: 0.8 MG/DL
BUN SERPL-MCNC: 43 MG/DL
CALCIUM SERPL-MCNC: 8.3 MG/DL
CHLORIDE SERPL-SCNC: 105 MMOL/L
CO2 SERPL-SCNC: 26 MMOL/L
CREAT SERPL-MCNC: 1.8 MG/DL
DIASTOLIC DYSFUNCTION: NO
DIFFERENTIAL METHOD: ABNORMAL
EOSINOPHIL # BLD AUTO: 0.2 K/UL
EOSINOPHIL NFR BLD: 2.8 %
ERYTHROCYTE [DISTWIDTH] IN BLOOD BY AUTOMATED COUNT: 15.5 %
EST. GFR  (AFRICAN AMERICAN): 40 ML/MIN/1.73 M^2
EST. GFR  (NON AFRICAN AMERICAN): 35 ML/MIN/1.73 M^2
GLUCOSE SERPL-MCNC: 76 MG/DL
HCT VFR BLD AUTO: 38.8 %
HGB BLD-MCNC: 12.2 G/DL
LYMPHOCYTES # BLD AUTO: 1.1 K/UL
LYMPHOCYTES NFR BLD: 13.9 %
MCH RBC QN AUTO: 30 PG
MCHC RBC AUTO-ENTMCNC: 31.4 %
MCV RBC AUTO: 95 FL
MONOCYTES # BLD AUTO: 0.9 K/UL
MONOCYTES NFR BLD: 11.7 %
NEUTROPHILS # BLD AUTO: 5.6 K/UL
NEUTROPHILS NFR BLD: 71 %
PLATELET # BLD AUTO: 200 K/UL
PMV BLD AUTO: 10.7 FL
POTASSIUM SERPL-SCNC: 4.4 MMOL/L
PROT SERPL-MCNC: 6 G/DL
RBC # BLD AUTO: 4.07 M/UL
SODIUM SERPL-SCNC: 141 MMOL/L
WBC # BLD AUTO: 7.87 K/UL

## 2017-03-19 PROCEDURE — 85025 COMPLETE CBC W/AUTO DIFF WBC: CPT

## 2017-03-19 PROCEDURE — 25000003 PHARM REV CODE 250: Performed by: INTERNAL MEDICINE

## 2017-03-19 PROCEDURE — 99232 SBSQ HOSP IP/OBS MODERATE 35: CPT | Mod: ,,, | Performed by: INTERNAL MEDICINE

## 2017-03-19 PROCEDURE — 25000003 PHARM REV CODE 250: Performed by: PHYSICIAN ASSISTANT

## 2017-03-19 PROCEDURE — 21400001 HC TELEMETRY ROOM

## 2017-03-19 PROCEDURE — 63600175 PHARM REV CODE 636 W HCPCS: Performed by: EMERGENCY MEDICINE

## 2017-03-19 PROCEDURE — 93018 CV STRESS TEST I&R ONLY: CPT | Mod: ,,, | Performed by: INTERNAL MEDICINE

## 2017-03-19 PROCEDURE — 25000003 PHARM REV CODE 250: Performed by: NURSE PRACTITIONER

## 2017-03-19 PROCEDURE — 36415 COLL VENOUS BLD VENIPUNCTURE: CPT

## 2017-03-19 PROCEDURE — 63600175 PHARM REV CODE 636 W HCPCS: Performed by: PHYSICIAN ASSISTANT

## 2017-03-19 PROCEDURE — 78452 HT MUSCLE IMAGE SPECT MULT: CPT | Mod: 26,,, | Performed by: INTERNAL MEDICINE

## 2017-03-19 PROCEDURE — 93016 CV STRESS TEST SUPVJ ONLY: CPT | Mod: ,,, | Performed by: INTERNAL MEDICINE

## 2017-03-19 PROCEDURE — 80053 COMPREHEN METABOLIC PANEL: CPT

## 2017-03-19 RX ORDER — REGADENOSON 0.08 MG/ML
0.4 INJECTION, SOLUTION INTRAVENOUS ONCE
Status: COMPLETED | OUTPATIENT
Start: 2017-03-19 | End: 2017-03-19

## 2017-03-19 RX ADMIN — FUROSEMIDE 40 MG: 10 INJECTION, SOLUTION INTRAVENOUS at 05:03

## 2017-03-19 RX ADMIN — APIXABAN 2.5 MG: 2.5 TABLET, FILM COATED ORAL at 09:03

## 2017-03-19 RX ADMIN — FUROSEMIDE 40 MG: 10 INJECTION, SOLUTION INTRAVENOUS at 11:03

## 2017-03-19 RX ADMIN — LOSARTAN POTASSIUM 25 MG: 25 TABLET, FILM COATED ORAL at 11:03

## 2017-03-19 RX ADMIN — METOPROLOL TARTRATE 50 MG: 50 TABLET ORAL at 05:03

## 2017-03-19 RX ADMIN — ASPIRIN 81 MG 81 MG: 81 TABLET ORAL at 11:03

## 2017-03-19 RX ADMIN — APIXABAN 2.5 MG: 2.5 TABLET, FILM COATED ORAL at 11:03

## 2017-03-19 RX ADMIN — REGADENOSON 0.4 MG: 0.08 INJECTION, SOLUTION INTRAVENOUS at 10:03

## 2017-03-19 RX ADMIN — METOPROLOL TARTRATE 50 MG: 50 TABLET ORAL at 02:03

## 2017-03-19 NOTE — ASSESSMENT & PLAN NOTE
-EF of 30-35 % currently  - contiue to diurese  - increased BB   - ARB initiated  - Cardiology following  -Stress test completed today-results pending

## 2017-03-19 NOTE — PROGRESS NOTES
Cardiology Progress Note        SUBJECTIVE:     History of Present Illness:  Patient is a 77 y.o. male presents with afib and cardiomyopathy is feeling better breathing improved still having fluctuation in heart rate.he is having his cardiolite today..      OBJECTIVE:     Vital Signs (Most Recent)  Temp: 97.4 °F (36.3 °C) (03/19/17 0800)  Pulse: (!) 121 (03/19/17 0800)  Resp: 18 (03/19/17 0800)  BP: 113/77 (03/19/17 0800)  SpO2: 96 % (03/19/17 0800)    Vital Signs Range (Last 24H):  Temp:  [97.4 °F (36.3 °C)-98 °F (36.7 °C)]   Pulse:  [101-121]   Resp:  [18]   BP: ()/(58-77)   SpO2:  [94 %-98 %]     Intake/Output last 3 shifts:  I/O last 3 completed shifts:  In: 620 [P.O.:620]  Out: 3200 [Urine:3200]    Intake/Output this shift:       Review of patient's allergies indicates:  No Known Allergies    Current Facility-Administered Medications   Medication    alprazolam tablet 0.25 mg    apixaban tablet 2.5 mg    aspirin chewable tablet 81 mg    furosemide injection 40 mg    losartan tablet 25 mg    metoprolol tartrate (LOPRESSOR) tablet 50 mg    ondansetron injection 4 mg     No current facility-administered medications on file prior to encounter.      Current Outpatient Prescriptions on File Prior to Encounter   Medication Sig    furosemide (LASIX) 20 MG tablet Take 1 tablet (20 mg total) by mouth once daily.    metoprolol tartrate (LOPRESSOR) 50 MG tablet Take 1 tablet (50 mg total) by mouth 2 (two) times daily.       Physical Exam:  General appearance: alert, appears stated age and cooperative  Head: Normocephalic, without obvious abnormality, atraumatic  Neck: no adenopathy, no carotid bruit, no JVD, supple, symmetrical, trachea midline and thyroid not enlarged, symmetric, no tenderness/mass/nodules  Back:  no skin lesions, erythema, or scars  Lungs:  clear to auscultation bilaterally  Chest wall: no tenderness  Heart:irregularily irregular rate and rhythm, S1, S2 normal, no murmur, click, rub or  gallop  Abdomen: soft, non-tender; bowel sounds normal; no masses,  no organomegaly  Extremities: extremities normal, atraumatic, no cyanosis or edema  Pulses: 2+ and symmetric  Skin: Skin color, texture, turgor normal. No rashes or lesions  Neurologic: Grossly normal    Laboratory:  Chemistry:   Lab Results   Component Value Date     03/19/2017    K 4.4 03/19/2017     03/19/2017    CO2 26 03/19/2017    BUN 43 (H) 03/19/2017    CREATININE 1.8 (H) 03/19/2017    CALCIUM 8.3 (L) 03/19/2017     Cardiac Markers:   Lab Results   Component Value Date    TROPONINI 0.035 (H) 03/17/2017     Cardiac Markers (Last 3):   Lab Results   Component Value Date    TROPONINI 0.035 (H) 03/17/2017    TROPONINI 0.033 (H) 03/16/2017    TROPONINI 0.035 (H) 03/16/2017     CBC:   Lab Results   Component Value Date    WBC 7.87 03/19/2017    HGB 12.2 (L) 03/19/2017    HCT 38.8 (L) 03/19/2017    MCV 95 03/19/2017     03/19/2017     Lipids: No results found for: CHOL, TRIG, HDL, LDLDIRECT  Coagulation:   Lab Results   Component Value Date    INR 1.2 03/16/2017    APTT 28.0 03/16/2017       Diagnostic Results:  ECG: Reviewed      ASSESSMENT/PLAN:     Patient Active Problem List   Diagnosis    Atrial fibrillation with RVR    CKD (chronic kidney disease) stage 3, GFR 30-59 ml/min    Venous stasis dermatitis of both lower extremities    Moderate aortic insufficiency    Mild mitral insufficiency    CHF (congestive heart failure)    still has  Rate control issues.appears better compensated. Nuclear pending .will benefit from adding amio for rate control .    Plan:   Amiodarone 200 mg po bid   Await nuclear results.  Increase losartan to 50 mg po daily.

## 2017-03-19 NOTE — ASSESSMENT & PLAN NOTE
-diuresed  -dressing changes  -leg elevation  -wound care consult  -patient follows A for compression therapy

## 2017-03-19 NOTE — SUBJECTIVE & OBJECTIVE
Interval History:     Review of Systems   Constitutional: Negative for chills, diaphoresis, fatigue and fever.   HENT: Negative for drooling, ear pain, rhinorrhea and sore throat.    Eyes: Negative.    Respiratory: Positive for shortness of breath. Negative for cough and wheezing.    Cardiovascular: Positive for palpitations and leg swelling.   Gastrointestinal: Negative for abdominal pain, constipation, diarrhea and nausea.   Endocrine: Negative.    Genitourinary: Negative for dysuria, hematuria and urgency.   Musculoskeletal: Negative.    Skin: Negative for color change and wound.   Allergic/Immunologic: Negative.    Neurological: Negative for dizziness, syncope and speech difficulty.   Hematological: Negative.    Psychiatric/Behavioral: Negative.      Objective:     Vital Signs (Most Recent):  Temp: 97.9 °F (36.6 °C) (03/19/17 1215)  Pulse: (!) 115 (03/19/17 1215)  Resp: 18 (03/19/17 1215)  BP: 106/68 (03/19/17 1215)  SpO2: 96 % (03/19/17 1215) Vital Signs (24h Range):  Temp:  [97.4 °F (36.3 °C)-98 °F (36.7 °C)] 97.9 °F (36.6 °C)  Pulse:  [101-121] 115  Resp:  [18] 18  SpO2:  [94 %-98 %] 96 %  BP: ()/(58-77) 106/68     Weight: 64.8 kg (142 lb 12.8 oz)  Body mass index is 21.09 kg/(m^2).    Intake/Output Summary (Last 24 hours) at 03/19/17 1607  Last data filed at 03/19/17 1500   Gross per 24 hour   Intake                0 ml   Output             1950 ml   Net            -1950 ml      Physical Exam   Constitutional: He is oriented to person, place, and time. He appears well-developed and well-nourished. No distress.   HENT:   Head: Normocephalic and atraumatic.   Eyes: EOM are normal.   Neck: Normal range of motion. Neck supple.   Cardiovascular:   irreg irreg  tachy   Pulmonary/Chest: Effort normal. No respiratory distress. He has decreased breath sounds.   Abdominal: Soft. Bowel sounds are normal. He exhibits no distension. There is no tenderness.   Musculoskeletal: Normal range of motion. Edema: Trace  edema to BLE.   Neurological: He is alert and oriented to person, place, and time.   Skin: Skin is warm and dry. There is pallor.   Compression dressing intact to bilateral lower extremities     Nursing note and vitals reviewed.      Significant Labs:   CBC:   Recent Labs  Lab 03/18/17  0709 03/19/17  0725   WBC 6.85 7.87   HGB 11.0* 12.2*   HCT 35.4* 38.8*    200     CMP:   Recent Labs  Lab 03/18/17  0709 03/19/17  0725    141   K 4.5 4.4    105   CO2 23 26   GLU 96 76   BUN 39* 43*   CREATININE 1.9* 1.8*   CALCIUM 8.1* 8.3*   PROT 5.9* 6.0   ALBUMIN 3.2* 3.0*   BILITOT 0.8 0.8   ALKPHOS 84 85   AST 56* 30   ALT 38 30   ANIONGAP 9 10   EGFRNONAA 33* 35*       Significant Imaging:   Imaging Results         NM Myocardial Perfusion Spect Multi Pharmacologic (In process)         X-Ray Chest 1 View (Final result) Result time:  03/16/17 16:02:56    Procedure changed from X-Ray Chest PA And Lateral        Final result by Judith Roman MD (03/16/17 16:02:56)    Impression:     Pleural effusions and cardiomegaly.          Electronically signed by: JUDITH ROMAN MD  Date:     03/16/17  Time:    16:02     Narrative:    Exam: XR CHEST 1 VIEW    Clinical History: Acute onset Chest Pain    Findings:     Small bilateral pleural effusions.  Mild cardiomegaly.  no acute infiltrate detected.

## 2017-03-19 NOTE — PROGRESS NOTES
"[facility] Hospital Medicine  Progress Note    Patient Name: Yan Pickering Jr.  MRN: [id number]  Patient Class: IP- Inpatient   Admission Date: 3/16/2017  Length of Stay: 3 days  Attending Physician: Dr. Jose A Knowles  Primary Care Provider: Miller Dinero MD        Subjective:     Principal Problem:Atrial fibrillation with RVR    HPI:  Yan Pickering is a 76 year old male with history of AVR, chronic atrial fibrillation, and CKD who was seen in clinic today for worsening shortness and lower extremity swelling. He reports running out of all medications "some months" ago and failed to get them refilled. He reports weight gain of greater than 15lbs. Admits to palpitations and exertional dyspnea. Denies chest pain, fever, abdominal pain, and PND. EKG showed Afib with RVR and asked to report to ED for further management. He is followed by [facility] for venous stasis wounds.        Hospital Course:  Patient was subsequently admitted for atrial fibrillation with RVR, symptomatic AR, and decompensated heart failure. CXR shows pleural effusions with cardiomegaly. BNP 2868. Troponin 0.033. Echocardiogram has been ordered. He received IV Cardizem x 2. Metoprolol resumed. Patient undecided on oral anticoagulation so ASA was started for CVA prophylaxis. Cardiology has been consulted.     Pt diuresising well - Echo with cardiomyopathy- will likely need a stress/cath- cards on board    03/19/17- pt seen and examined today with plan of care discussed with patient and family at bedside.  Pt reports palpitations and denies additional complaints at this time.  Improvement to BLE edema noted. Atrial fibrillation continued on monitor with -130's.  Cardiology following.  Nuclear Scan completed with results pending.        Interval History:     Review of Systems   Constitutional: Negative for chills, diaphoresis, fatigue and fever.   HENT: Negative for drooling, ear pain, rhinorrhea and sore throat.    Eyes: Negative.  "   Respiratory: Positive for shortness of breath. Negative for cough and wheezing.    Cardiovascular: Positive for palpitations and leg swelling.   Gastrointestinal: Negative for abdominal pain, constipation, diarrhea and nausea.   Endocrine: Negative.    Genitourinary: Negative for dysuria, hematuria and urgency.   Musculoskeletal: Negative.    Skin: Negative for color change and wound.   Allergic/Immunologic: Negative.    Neurological: Negative for dizziness, syncope and speech difficulty.   Hematological: Negative.    Psychiatric/Behavioral: Negative.      Objective:     Vital Signs (Most Recent):  Temp: 97.9 °F (36.6 °C) (03/19/17 1215)  Pulse: (!) 115 (03/19/17 1215)  Resp: 18 (03/19/17 1215)  BP: 106/68 (03/19/17 1215)  SpO2: 96 % (03/19/17 1215) Vital Signs (24h Range):  Temp:  [97.4 °F (36.3 °C)-98 °F (36.7 °C)] 97.9 °F (36.6 °C)  Pulse:  [101-121] 115  Resp:  [18] 18  SpO2:  [94 %-98 %] 96 %  BP: ()/(58-77) 106/68     Weight: 64.8 kg (142 lb 12.8 oz)  Body mass index is 21.09 kg/(m^2).    Intake/Output Summary (Last 24 hours) at 03/19/17 1607  Last data filed at 03/19/17 1500   Gross per 24 hour   Intake                0 ml   Output             1950 ml   Net            -1950 ml      Physical Exam   Constitutional: He is oriented to person, place, and time. He appears well-developed and well-nourished. No distress.   HENT:   Head: Normocephalic and atraumatic.   Eyes: EOM are normal.   Neck: Normal range of motion. Neck supple.   Cardiovascular:   irreg irreg  tachy   Pulmonary/Chest: Effort normal. No respiratory distress. He has decreased breath sounds.   Abdominal: Soft. Bowel sounds are normal. He exhibits no distension. There is no tenderness.   Musculoskeletal: Normal range of motion. Edema: Trace edema to BLE.   Neurological: He is alert and oriented to person, place, and time.   Skin: Skin is warm and dry. There is pallor.   Compression dressing intact to bilateral lower extremities     Nursing  note and vitals reviewed.      Significant Labs:   CBC:   Recent Labs  Lab 03/18/17  0709 03/19/17  0725   WBC 6.85 7.87   HGB 11.0* 12.2*   HCT 35.4* 38.8*    200     CMP:   Recent Labs  Lab 03/18/17  0709 03/19/17  0725    141   K 4.5 4.4    105   CO2 23 26   GLU 96 76   BUN 39* 43*   CREATININE 1.9* 1.8*   CALCIUM 8.1* 8.3*   PROT 5.9* 6.0   ALBUMIN 3.2* 3.0*   BILITOT 0.8 0.8   ALKPHOS 84 85   AST 56* 30   ALT 38 30   ANIONGAP 9 10   EGFRNONAA 33* 35*       Significant Imaging:   Imaging Results         NM Myocardial Perfusion Spect Multi Pharmacologic (In process)         X-Ray Chest 1 View (Final result) Result time:  03/16/17 16:02:56    Procedure changed from X-Ray Chest PA And Lateral        Final result by Judith Roman MD (03/16/17 16:02:56)    Impression:     Pleural effusions and cardiomegaly.          Electronically signed by: JUDITH ROMAN MD  Date:     03/16/17  Time:    16:02     Narrative:    Exam: XR CHEST 1 VIEW    Clinical History: Acute onset Chest Pain    Findings:     Small bilateral pleural effusions.  Mild cardiomegaly.  no acute infiltrate detected.            Assessment/Plan:      * Atrial fibrillation with RVR  -Cardiology following  -Lopressor increased   -Eliquis continued        CKD (chronic kidney disease) stage 3, GFR 30-59 ml/min  -kidney function at baseline  -Closely monitor      Venous stasis dermatitis of both lower extremities  -diuresed  -dressing changes  -leg elevation  -wound care consult  -patient follows [facility] for compression therapy    Moderate aortic insufficiency  Likely symptomatic after medications were stopped  -plan as above      CHF (congestive heart failure)  -EF of 30-35 % currently  - contiue to diurese  - increased BB   - ARB initiated  - Cardiology following  -Stress test completed today-results pending    VTE Risk Mitigation         Ordered     apixaban tablet 2.5 mg  2 times daily     Route:  Oral        03/17/17 1216     Medium Risk of VTE   Once      03/16/17 1924          Robina Arthur NP  Department of Hospital Medicine   [facility]

## 2017-03-19 NOTE — PLAN OF CARE
Problem: Patient Care Overview  Goal: Plan of Care Review  Outcome: Ongoing (interventions implemented as appropriate)  Patient remains free from falls, fall precaution in place. Assist x1.  VS stable. Denies pain. Diuresing. Stress test done today.   No other C/O at this time.Call bell and belongings within reach, reminded to call for assistance.

## 2017-03-20 VITALS
TEMPERATURE: 98 F | SYSTOLIC BLOOD PRESSURE: 88 MMHG | WEIGHT: 142.81 LBS | RESPIRATION RATE: 18 BRPM | OXYGEN SATURATION: 96 % | HEIGHT: 69 IN | DIASTOLIC BLOOD PRESSURE: 64 MMHG | BODY MASS INDEX: 21.15 KG/M2 | HEART RATE: 88 BPM

## 2017-03-20 LAB
ALBUMIN SERPL BCP-MCNC: 2.9 G/DL
ALP SERPL-CCNC: 84 U/L
ALT SERPL W/O P-5'-P-CCNC: 26 U/L
ANION GAP SERPL CALC-SCNC: 10 MMOL/L
AST SERPL-CCNC: 22 U/L
BASOPHILS # BLD AUTO: 0.04 K/UL
BASOPHILS NFR BLD: 0.5 %
BILIRUB SERPL-MCNC: 0.8 MG/DL
BUN SERPL-MCNC: 42 MG/DL
CALCIUM SERPL-MCNC: 8.3 MG/DL
CHLORIDE SERPL-SCNC: 104 MMOL/L
CO2 SERPL-SCNC: 27 MMOL/L
CREAT SERPL-MCNC: 1.9 MG/DL
DIFFERENTIAL METHOD: ABNORMAL
EOSINOPHIL # BLD AUTO: 0.3 K/UL
EOSINOPHIL NFR BLD: 3.9 %
ERYTHROCYTE [DISTWIDTH] IN BLOOD BY AUTOMATED COUNT: 15.4 %
EST. GFR  (AFRICAN AMERICAN): 38 ML/MIN/1.73 M^2
EST. GFR  (NON AFRICAN AMERICAN): 33 ML/MIN/1.73 M^2
GLUCOSE SERPL-MCNC: 85 MG/DL
HCT VFR BLD AUTO: 41.1 %
HGB BLD-MCNC: 13.1 G/DL
LYMPHOCYTES # BLD AUTO: 1.2 K/UL
LYMPHOCYTES NFR BLD: 15 %
MCH RBC QN AUTO: 29.8 PG
MCHC RBC AUTO-ENTMCNC: 31.9 %
MCV RBC AUTO: 93 FL
MONOCYTES # BLD AUTO: 0.9 K/UL
MONOCYTES NFR BLD: 11.7 %
NEUTROPHILS # BLD AUTO: 5.4 K/UL
NEUTROPHILS NFR BLD: 68.9 %
PLATELET # BLD AUTO: 215 K/UL
PMV BLD AUTO: 10.2 FL
POTASSIUM SERPL-SCNC: 3.9 MMOL/L
PROT SERPL-MCNC: 6 G/DL
RBC # BLD AUTO: 4.4 M/UL
SODIUM SERPL-SCNC: 141 MMOL/L
WBC # BLD AUTO: 7.78 K/UL

## 2017-03-20 PROCEDURE — 63600175 PHARM REV CODE 636 W HCPCS: Performed by: EMERGENCY MEDICINE

## 2017-03-20 PROCEDURE — 25000003 PHARM REV CODE 250: Performed by: PHYSICIAN ASSISTANT

## 2017-03-20 PROCEDURE — 80053 COMPREHEN METABOLIC PANEL: CPT

## 2017-03-20 PROCEDURE — 85025 COMPLETE CBC W/AUTO DIFF WBC: CPT

## 2017-03-20 PROCEDURE — 99232 SBSQ HOSP IP/OBS MODERATE 35: CPT | Mod: ,,, | Performed by: INTERNAL MEDICINE

## 2017-03-20 PROCEDURE — 25000003 PHARM REV CODE 250: Performed by: INTERNAL MEDICINE

## 2017-03-20 PROCEDURE — 25000003 PHARM REV CODE 250: Performed by: NURSE PRACTITIONER

## 2017-03-20 PROCEDURE — 36415 COLL VENOUS BLD VENIPUNCTURE: CPT

## 2017-03-20 RX ORDER — FUROSEMIDE 40 MG/1
40 TABLET ORAL DAILY
Qty: 30 TABLET | Refills: 0 | Status: SHIPPED | OUTPATIENT
Start: 2017-03-21 | End: 2017-03-20

## 2017-03-20 RX ORDER — LOSARTAN POTASSIUM 25 MG/1
25 TABLET ORAL DAILY
Qty: 30 TABLET | Refills: 0 | Status: SHIPPED | OUTPATIENT
Start: 2017-03-20 | End: 2017-03-28 | Stop reason: SDUPTHER

## 2017-03-20 RX ORDER — METOPROLOL TARTRATE 50 MG/1
50 TABLET ORAL EVERY 8 HOURS
Qty: 90 TABLET | Refills: 0 | Status: SHIPPED | OUTPATIENT
Start: 2017-03-20 | End: 2017-03-28 | Stop reason: SDUPTHER

## 2017-03-20 RX ORDER — FUROSEMIDE 40 MG/1
40 TABLET ORAL DAILY
Qty: 30 TABLET | Refills: 0 | Status: SHIPPED | OUTPATIENT
Start: 2017-03-21 | End: 2017-03-28 | Stop reason: SDUPTHER

## 2017-03-20 RX ORDER — FUROSEMIDE 40 MG/1
40 TABLET ORAL DAILY
Status: DISCONTINUED | OUTPATIENT
Start: 2017-03-21 | End: 2017-03-20 | Stop reason: HOSPADM

## 2017-03-20 RX ORDER — NAPROXEN SODIUM 220 MG/1
81 TABLET, FILM COATED ORAL DAILY
Refills: 0 | COMMUNITY
Start: 2017-03-20 | End: 2020-02-10

## 2017-03-20 RX ADMIN — LOSARTAN POTASSIUM 25 MG: 25 TABLET, FILM COATED ORAL at 08:03

## 2017-03-20 RX ADMIN — METOPROLOL TARTRATE 50 MG: 50 TABLET ORAL at 03:03

## 2017-03-20 RX ADMIN — FUROSEMIDE 40 MG: 10 INJECTION, SOLUTION INTRAVENOUS at 08:03

## 2017-03-20 RX ADMIN — APIXABAN 2.5 MG: 2.5 TABLET, FILM COATED ORAL at 08:03

## 2017-03-20 RX ADMIN — ASPIRIN 81 MG 81 MG: 81 TABLET ORAL at 08:03

## 2017-03-20 NOTE — NURSING
Left FA saline lock discontinued with canula intact; tolerated well.  Discharge instructions, new home medication list, and post discharge instructions discussed with pt and family with teachback received.  Tele monitor discontinued and brought to tele monitor tech.  Discharged to family, via wheelchair, in no apparent distress. All personal belongings taken with pt and family. Encouraged continued compliance with MD directives.

## 2017-03-20 NOTE — PLAN OF CARE
Problem: Patient Care Overview  Goal: Plan of Care Review  Outcome: Ongoing (interventions implemented as appropriate)  No problems or issues over night. Significant urine output this shift. Pt denies any pain or discomfort. No falls or injury. Spouse at bedside.

## 2017-03-20 NOTE — PROGRESS NOTES
Cardiology Progress Note        SUBJECTIVE:     History of Present Illness:  Patient is a 77 y.o. male presents with A Fib with RVR and decompensated systolic CHF. He is feeling much better today and his condition continues to improved. He currently denies shortness of breath and states bilateral leg swelling has greatly improved. Vital signs and labs stable.       OBJECTIVE:     Vital Signs (Most Recent)  Temp: 97.8 °F (36.6 °C) (03/20/17 1125)  Pulse: 95 (03/20/17 1311)  Resp: 18 (03/20/17 1125)  BP: (!) 88/64 (03/20/17 1125)  SpO2: 96 % (03/20/17 1125)    Vital Signs Range (Last 24H):  Temp:  [97.6 °F (36.4 °C)-97.8 °F (36.6 °C)]   Pulse:  []   Resp:  [18]   BP: ()/(53-72)   SpO2:  [93 %-97 %]     Intake/Output last 3 shifts:  I/O last 3 completed shifts:  In: 840 [P.O.:840]  Out: 4800 [Urine:4800]    Intake/Output this shift:       Review of patient's allergies indicates:  No Known Allergies    Current Facility-Administered Medications   Medication    alprazolam tablet 0.25 mg    apixaban tablet 2.5 mg    aspirin chewable tablet 81 mg    [START ON 3/21/2017] furosemide tablet 40 mg    losartan tablet 25 mg    metoprolol tartrate (LOPRESSOR) tablet 50 mg    ondansetron injection 4 mg     No current facility-administered medications on file prior to encounter.      Current Outpatient Prescriptions on File Prior to Encounter   Medication Sig    furosemide (LASIX) 20 MG tablet Take 1 tablet (20 mg total) by mouth once daily.    metoprolol tartrate (LOPRESSOR) 50 MG tablet Take 1 tablet (50 mg total) by mouth 2 (two) times daily.       Physical Exam:  General appearance: alert, appears stated age and cooperative  Head: Normocephalic, without obvious abnormality, atraumatic  Eyes:  conjunctivae/corneas clear. PERRL, EOM's intact. Fundi benign.  Nose: no discharge  Throat: normal findings: tongue midline and normal  Neck: no adenopathy, no carotid bruit, no JVD, supple, symmetrical, trachea midline  and thyroid not enlarged, symmetric, no tenderness/mass/nodules  Back:  no skin lesions, erythema, or scars  Lungs:  clear to auscultation bilaterally  Chest wall: no tenderness  Heart: irregular rate and rhythm, S1, S2 normal, no murmur, click, rub or gallop  Abdomen: soft, non-tender; bowel sounds normal; no masses,  no organomegaly  Extremities: extremities normal, atraumatic, no cyanosis or edema  Pulses: 1+ and symmetric  Skin: Skin color, texture, turgor normal. No rashes or lesions  Neurologic: Grossly normal    Laboratory:  Chemistry:   Lab Results   Component Value Date     03/20/2017    K 3.9 03/20/2017     03/20/2017    CO2 27 03/20/2017    BUN 42 (H) 03/20/2017    CREATININE 1.9 (H) 03/20/2017    CALCIUM 8.3 (L) 03/20/2017     Cardiac Markers:   Lab Results   Component Value Date    TROPONINI 0.035 (H) 03/17/2017     Cardiac Markers (Last 3):   Lab Results   Component Value Date    TROPONINI 0.035 (H) 03/17/2017    TROPONINI 0.033 (H) 03/16/2017    TROPONINI 0.035 (H) 03/16/2017     CBC:   Lab Results   Component Value Date    WBC 7.78 03/20/2017    HGB 13.1 (L) 03/20/2017    HCT 41.1 03/20/2017    MCV 93 03/20/2017     03/20/2017     Lipids: No results found for: CHOL, TRIG, HDL, LDLDIRECT  Coagulation:   Lab Results   Component Value Date    INR 1.2 03/16/2017    APTT 28.0 03/16/2017       Diagnostic Results:  ECG: Reviewed  X-Ray: Reviewed  US: Reviewed  CT: Reviewed  Echo: Reviewed      ASSESSMENT/PLAN:     Patient Active Problem List   Diagnosis    Atrial fibrillation with RVR    CKD (chronic kidney disease) stage 3, GFR 30-59 ml/min    Venous stasis dermatitis of both lower extremities    Moderate aortic insufficiency    Mild mitral insufficiency    CHF (congestive heart failure)      Patient doing well. Systolic Heart Failure currently compensated. No bleeding on Eliquis.     Plan:     Will continue current medications and treatment plan  Low sodium diet of 1.5 grams daily  and limit fluid intake to 1.5 liters daily  Continue B blocker, ARB, ASA and Eliquis  Patient can be discharged for Cardiology standpoint  Follow up in Cardiology Clinic in one week or sooner       Chart reviewed. Dr. Herrera agrees with plan as outlined above.

## 2017-03-20 NOTE — PHYSICIAN QUERY
PT Name: Yan Pickering Jr.  MR #: [id number]     Physician Query Form - Documentation Clarification      CDS/: Lavonne Field               Contact information: [phone] or [phone]    This form is a permanent document in the medical record.     Query Date: March 20, 2017  By submitting this query, we are merely seeking further clarification of documentation. Please utilize your independent clinical judgment when addressing the question(s) below.    (The Medical record reflects the following:)      Supporting Clinical Findings Location in Medical Record     Echo with Cardiomyopathy       PN 3-18   CONCLUSIONS :     1 - Biatrial enlargement.   2 - Moderately depressed left ventricular systolic function (EF 30-35%).   3 - Normal left ventricular diastolic function.   4 - Right ventricular enlargement with mildly depressed systolic function.   5 - The estimated PA systolic pressure is greater than 40 mmHg.   6 - Mild aortic regurgitation.   7 - Moderate mitral regurgitation.   8 - Small pericardial effusion.         Echo 3-17                                                                            Doctor, Please specify diagnosis or diagnoses associated with above clinical findings.    Physician Use Only      Please specify the Type of Cardiomyopathy          Dilated non ischemic cardiomyopathy probably related to uncontrolled untreated atrial fibrillation                                                                                                               [  ] Unable to determine

## 2017-03-20 NOTE — DISCHARGE SUMMARY
"[facility] Hospital Medicine  Discharge Summary      Patient Name: Yan Pickering Jr.  MRN: [id number]  Admission Date: 3/16/2017  Hospital Length of Stay: 4 days  Discharge Date and Time: 3/20/2017  3:25 PM  Attending Physician: Dr. Jose A Knowles   Discharging Provider: Robina Arthur NP  Primary Care Provider: Miller Dinero MD      HPI:   Yan Pickering is a 76 year old male with history of AVR, chronic atrial fibrillation, and CKD who was seen in clinic today for worsening shortness and lower extremity swelling. He reports running out of all medications "some months" ago and failed to get them refilled. He reports weight gain of greater than 15lbs. Admits to palpitations and exertional dyspnea. Denies chest pain, fever, abdominal pain, and PND. EKG showed Afib with RVR and asked to report to ED for further management. He is followed by [facility] for venous stasis wounds.        * No surgery found *      Indwelling Lines/Drains at time of discharge:   Lines/Drains/Airways          No matching active lines, drains, or airways        Hospital Course:   Patient admitted to Telemetry Unit for atrial fibrillation with RVR, symptomatic AR, and decompensated heart failure. CXR shows pleural effusions with cardiomegaly. BNP 2868. Troponin 0.033. Echocardiogram showed  EF 30%, moderate MVR, no diastolic dysfunction, mild AVR, and small preicardial effusion.  He received IV Cardizem x 2 with Metoprolol resumed. Patient undecided on oral anticoagulation so ASA was started for CVA prophylaxis. Cardiology has been consulted.  Pt diuresed well with symptom improvement verbalized.  Stress test completed with normal myocardial perfusion noted.  Kidney function at baseline.  Pt seen and examined on the date of discharge and deemed suitable for discharge to home accompanied by spouse.  Current medication resumed with ASA, Eliquis, Lasix, Cozaar, and Lopressor continued.  Pt counseled on the importance of maintaining compliance " with prescribed medication regime and dietary/ fluid restrictions.  Pt instructed to follow up with PCP, Cardiology in 1 week, and to resume previously ordered outpatient wound care.    Consults:   Consults         Status Ordering Provider     Inpatient consult to Cardiology  Once     Provider:  Roya Dodge MD    Completed YESSI MCKAY          Significant Diagnostic Studies:   Imaging Results         NM Myocardial Perfusion Spect Multi Pharmacologic (In process)         X-Ray Chest 1 View (Final result) Result time:  03/16/17 16:02:56    Procedure changed from X-Ray Chest PA And Lateral        Final result by Judith Florez MD (03/16/17 16:02:56)    Impression:     Pleural effusions and cardiomegaly.          Electronically signed by: JUDITH FLOREZ MD  Date:     03/16/17  Time:    16:02     Narrative:    Exam: XR CHEST 1 VIEW    Clinical History: Acute onset Chest Pain    Findings:     Small bilateral pleural effusions.  Mild cardiomegaly.  no acute infiltrate detected.              Pending Diagnostic Studies:     Procedure Component Value Units Date/Time    NM Myocardial Perfusion Spect Multi Pharmacologic [[id number]] Resulted:  03/19/17 0802    Order Status:  Sent Lab Status:  In process Updated:  03/19/17 1216        Final Active Diagnoses:    Diagnosis Date Noted POA    PRINCIPAL PROBLEM:  Atrial fibrillation with RVR [I48.91] 04/21/2015 Yes     Chronic    CHF (congestive heart failure) [I50.9] 03/16/2017 Yes    Venous stasis dermatitis of both lower extremities [I83.11, I83.12]  Yes    Moderate aortic insufficiency [I35.1]  Yes    CKD (chronic kidney disease) stage 3, GFR 30-59 ml/min [N18.3]  Yes      Problems Resolved During this Admission:    Diagnosis Date Noted Date Resolved POA       Discharged Condition: stable    Disposition: Home or Self Care    Follow Up:  Follow-up Information     Follow up with Miller Dinero MD In 3 days.    Specialty:  Internal Medicine    Why:  hospital follow up -  please evaluate need for continued Lasix dosage    Contact information:    [address]  [address]  [place] [address]  [phone]          Follow up with Filipe Bloom NP In 1 week.    Specialty:  Cardiology    Why:  follow up for Atrial fibrillation     Contact information:    [address]  [place] [address]  [phone]          Patient Instructions:     Diet general   Order Specific Question Answer Comments   Na restriction, if any: 2gNa      Activity as tolerated     Call MD for:  temperature >100.4     Call MD for:  persistent nausea and vomiting or diarrhea     Call MD for:  increased confusion or weakness     Call MD for:  persistent dizziness, light-headedness, or visual disturbances     Call MD for:  difficulty breathing or increased cough     Call MD for:  severe uncontrolled pain       Medications:  Reconciled Home Medications:   Discharge Medication List as of 3/20/2017  2:38 PM      START taking these medications    Details   apixaban 2.5 mg Tab Take 1 tablet (2.5 mg total) by mouth 2 (two) times daily., Starting 3/20/2017, Until Wed 4/19/17, Normal      aspirin 81 MG Chew Take 1 tablet (81 mg total) by mouth once daily., Starting 3/20/2017, Until Tue 3/20/18, OTC      losartan (COZAAR) 25 MG tablet Take 1 tablet (25 mg total) by mouth once daily., Starting 3/20/2017, Until Wed 4/19/17, Normal         CONTINUE these medications which have CHANGED    Details   furosemide (LASIX) 40 MG tablet Take 1 tablet (40 mg total) by mouth once daily., Starting 3/21/2017, Until Thu 4/20/17, Normal      metoprolol tartrate (LOPRESSOR) 50 MG tablet Take 1 tablet (50 mg total) by mouth every 8 (eight) hours., Starting 3/20/2017, Until Wed 4/19/17, Normal           Time spent on the discharge of patient: 35 minutes    Robina Arthur NP  Department of Hospital Medicine  [facility]

## 2017-03-22 ENCOUNTER — PATIENT OUTREACH (OUTPATIENT)
Dept: ADMINISTRATIVE | Facility: CLINIC | Age: 77
End: 2017-03-22
Payer: MEDICARE

## 2017-03-22 ENCOUNTER — OFFICE VISIT (OUTPATIENT)
Dept: INTERNAL MEDICINE | Facility: CLINIC | Age: 77
End: 2017-03-22
Payer: MEDICARE

## 2017-03-22 VITALS
SYSTOLIC BLOOD PRESSURE: 90 MMHG | HEIGHT: 69 IN | HEART RATE: 110 BPM | DIASTOLIC BLOOD PRESSURE: 58 MMHG | WEIGHT: 132.25 LBS | BODY MASS INDEX: 19.59 KG/M2 | TEMPERATURE: 98 F | OXYGEN SATURATION: 98 %

## 2017-03-22 DIAGNOSIS — N18.30 CKD (CHRONIC KIDNEY DISEASE) STAGE 3, GFR 30-59 ML/MIN: ICD-10-CM

## 2017-03-22 DIAGNOSIS — R60.0 PERIPHERAL EDEMA: ICD-10-CM

## 2017-03-22 DIAGNOSIS — I48.19 PERSISTENT ATRIAL FIBRILLATION: Primary | ICD-10-CM

## 2017-03-22 DIAGNOSIS — I50.22 CHRONIC SYSTOLIC HEART FAILURE: ICD-10-CM

## 2017-03-22 PROCEDURE — 1160F RVW MEDS BY RX/DR IN RCRD: CPT | Mod: S$GLB,,, | Performed by: INTERNAL MEDICINE

## 2017-03-22 PROCEDURE — 1159F MED LIST DOCD IN RCRD: CPT | Mod: S$GLB,,, | Performed by: INTERNAL MEDICINE

## 2017-03-22 PROCEDURE — 99214 OFFICE O/P EST MOD 30 MIN: CPT | Mod: S$GLB,,, | Performed by: INTERNAL MEDICINE

## 2017-03-22 PROCEDURE — 1126F AMNT PAIN NOTED NONE PRSNT: CPT | Mod: S$GLB,,, | Performed by: INTERNAL MEDICINE

## 2017-03-22 PROCEDURE — 1157F ADVNC CARE PLAN IN RCRD: CPT | Mod: S$GLB,,, | Performed by: INTERNAL MEDICINE

## 2017-03-22 PROCEDURE — 99999 PR PBB SHADOW E&M-EST. PATIENT-LVL III: CPT | Mod: PBBFAC,,, | Performed by: INTERNAL MEDICINE

## 2017-03-22 NOTE — PATIENT INSTRUCTIONS
About Arrhythmias    Electrical impulses cause the normal heart to beat 60 to 100 times a minute while at rest. These impulses come from a natural pacemaker deep inside the heart muscle. Each impulse causes the heart muscle to contract. This causes the blood to flow through the heart and out to the tissues and organs of your body.  An arrhythmia is a change from the normal speed or pattern of these electrical impulses. This can cause the heart to beat too fast (tachycardia); or too slow (bradycardia); or in an unsteady pattern (irregular rhythm).  Symptoms of arrhythmias  Different people experience arrhythmias differently. Sometimes they may not have symptoms, but just notice a change in their pulse. Symptoms can include:  · Fluttering feeling in the chest  · Shortness of breath  · Chest pain or pressure  · Neck fullness  · Lightheadedness or dizziness  · Fainting or almost fainting  · Palpitations (the sense that your heart is fluttering or beating fast or hard or irregularly)  · Tiredness, fatigue, or weakness  · Cardiac arrest  Causes of arrhythmias  Arrhythmias are most often due to heart disease such as:  · Coronary artery disease  · Heart valve disease  · Enlarged heart  · High blood pressure  · Heart failure  Other causes of  arrhythmia include:  · Certain medicines (such as asthma inhalers and decongestants)  · Some herbal supplements  · Cardiac stimulant drugs (such as cocaine, amphetamine, diet pills, certain decongestant cold medicines, caffeine, and nicotine)  · Excessive alcohol use  · Anxiety and panic disorder  · Thyroid disease  · Anemia  · Diabetes  · Sleep apnea  · Obesity  · Congenital heart disease  · Cardiac genetic diseases  Arrhythmias can often be prevented. The cause and type of arrhythmia determines the best treatment. Sometimes your doctor may want to monitor your heart rate over a 24-hour period or longer. This can help identify the cause of your arrhythmia and find the best treatment.  This can be done with a Holter monitor, a portable EKG recording device attached by wires to your chest. Or you may get an event monitor, which you can place over the skin in front of your heart to record heart rhythms. You can carry this with you as you go about your routine activities during the monitoring period. Implantable loop recorders may also be used to monitor the heart rhythm for up to 2 years. This miniature device is placed underneath the skin overlying the heart.  Home care  The following guidelines will help you care for yourself at home:  · Avoid cardiac stimulants (such as cocaine, amphetamine, diet pills, certain decongestant cold medicines, caffeine, and nicotine).  · If you smoke, stop smoking. Contact your doctor or a local stop-smoking program for help.  · Tell your doctor about any prescription, over-the-counter, or herbal medicines you take. These may be affecting your heart rhythm.  Follow-up care  Follow up with your healthcare provider, or as advised. If a Holter monitor has been recommended, contact the cardiologist you have been referred to as soon as you can  the device. Other outpatient tests may also be arranged for you at that time.  Call 911  This is the fastest and safest way to get to the emergency department. The paramedics can also start treatment on the way to the hospital, if needed.  Don't wait until your symptoms are severe to call 911. Other reasons to call 911 besides chest pain include:  · Chest, shoulder, arm, neck, or back pain  · Shortness of breath  · Feeling lightheaded, faint, or dizzy  · Unexplained fainting  · Rapid heart beat  · Slower than usual heart rate compared to your normal  · Very irregular heartbeat  · Chest pain (angina) with weakness, dizziness, heavy sweating, nausea, or vomiting  · Extreme drowsiness, or confusion  · Weakness of an arm or leg or one side of the face  · Difficulty with speech or vision  When to seek medical advice  Remember,  "things are not always like they are on TV. Sometimes it is not so obvious. You may only feel weak or just "not right." If it is not clear or if you have any doubt, call for advice.  · Seek help for chest pain, or it feels different from usual, even if your symptoms are mild.  · Don't drive yourself. Have someone else drive. If no one can drive you, call 911.  · If your doctor has given you medicines to take when you have symptoms, take them, but do not delay getting help while trying to find them.  Date Last Reviewed: 4/25/2016  © 1348-9760 [employer]. [address] [place] [address], [place] [address]. All rights reserved. This information is not intended as a substitute for professional medical care. Always follow your healthcare professional's instructions.        "

## 2017-03-22 NOTE — MR AVS SNAPSHOT
[facility]  [address] [facility] [address]  Phone: [phone]  Fax: [phone]                  Yan Pickering Jr.   3/22/2017 4:40 PM   Office Visit    Description:  Male : 1940   Provider:  Edith Beckford DO   Department:  [facility]           Reason for Visit     Follow-up           Diagnoses this Visit        Comments    Persistent atrial fibrillation    -  Primary     Chronic systolic heart failure         CKD (chronic kidney disease) stage 3, GFR 30-59 ml/min         Peripheral edema                To Do List           Future Appointments        Provider Department Dept Phone    3/28/2017 8:30 AM Filipe Bloom NP [facility] [phone]      Goals (5 Years of Data)     None      [facility]     [facility] On Call Nurse Care Line -  Assistance  Registered nurses in the [facility] On Call Center provide clinical advisement, health education, appointment booking, and other advisory services.  Call for this free service at [phone].             Medications           Message regarding Medications     Verify the changes and/or additions to your medication regime listed below are the same as discussed with your clinician today.  If any of these changes or additions are incorrect, please notify your healthcare provider.             Verify that the below list of medications is an accurate representation of the medications you are currently taking.  If none reported, the list may be blank. If incorrect, please contact your healthcare provider. Carry this list with you in case of emergency.           Current Medications     apixaban 2.5 mg Tab Take 1 tablet (2.5 mg total) by mouth 2 (two) times daily.    aspirin 81 MG Chew Take 1 tablet (81 mg total) by mouth once daily.    furosemide (LASIX) 40 MG tablet Take 1 tablet (40 mg total) by mouth once daily.    losartan (COZAAR) 25 MG tablet Take 1 tablet (25 mg total) by mouth once  "daily.    metoprolol tartrate (LOPRESSOR) 50 MG tablet Take 1 tablet (50 mg total) by mouth every 8 (eight) hours.           Clinical Reference Information           Your Vitals Were     BP Pulse Temp Height Weight SpO2    90/58 110 98.1 °F (36.7 °C) (Tympanic) 5' 9" (1.753 m) 60 kg (132 lb 4.4 oz) 98%    BMI                19.53 kg/m2          Blood Pressure          Most Recent Value    BP  (!)  90/58      Allergies as of 3/22/2017     No Known Allergies      Immunizations Administered on Date of Encounter - 3/22/2017     None      [facility] Sign-Up     Activating your [facility] account is as easy as 1-2-3!     1) Visit [email or web address], select Sign Up Now, enter this activation code and your date of birth, then select Next.  [email or web address]  Expires: 4/30/2017  3:07 PM      2) Create a username and password to use when you visit [facility] in the future and select a security question in case you lose your password and select Next.    3) Enter your e-mail address and click Sign Up!    Additional Information  If you have questions, please e-mail [email or web address] or call [phone] to talk to our [facility] staff. Remember, [facility] is NOT to be used for urgent needs. For medical emergencies, dial 911.         Language Assistance Services     ATTENTION: Language assistance services are available, free of charge. Please call [phone].      ATENCIÓN: Si habla dell, tiene a ozuna disposición servicios gratuitos de asistencia lingüística. Llame al [phone].     [facility] Ý: N?u b?n nói Ti?ng Vi?t, có các d?ch v? h? tr? ngôn ng? mi?n phí dành cho b?n. G?i s? [phone].         [facility] complies with applicable Federal civil rights laws and does not discriminate on the basis of race, color, national origin, age, disability, or sex.        "

## 2017-03-23 NOTE — PROGRESS NOTES
Subjective:       Patient ID: Yan Pickering Jr. is a 77 y.o. male.    Chief Complaint: Follow-up    HPI Comments: Yan Pickering Jr.  77 y.o. White male    Patient presents with:  Follow-up    HPI: Presents to the clinic with his wife for a hospital follow up. He was hospitalized from 3/16 to 3/20 for atrial fibrillation with RVR. He had been off medications for a while. He had gained weight and was short of breath. In the hospital he was treated with Cardizem and diuresed.   Atrial fibrillation--rate improved. Compliant with metoprolol.   Chronic systolic heart failure--EF was 30%. He is compliant with metoprolol and furosemide.   CKD III--stable.   He has chronic swelling in his legs. The swelling has improved since being on furosemide.   He has chronic venous stasis ulcers on his legs and is being managed by home health and wound care.     Past Medical History:  Atrial fibrillation  CKD (chronic kidney disease) stage 3, GFR 30-5*  Mild mitral insufficiency  Moderate aortic insufficiency  Venous stasis dermatitis of both lower extremi*    Current Outpatient Prescriptions on File Prior to Visit:  apixaban 2.5 mg Tab, Take 1 tablet (2.5 mg total) by mouth 2 (two) times daily., Disp: 60 tablet, Rfl: 0  aspirin 81 MG Chew, Take 1 tablet (81 mg total) by mouth once daily., Disp: , Rfl: 0  furosemide (LASIX) 40 MG tablet, Take 1 tablet (40 mg total) by mouth once daily., Disp: 30 tablet, Rfl: 0  losartan (COZAAR) 25 MG tablet, Take 1 tablet (25 mg total) by mouth once daily., Disp: 30 tablet, Rfl: 0  metoprolol tartrate (LOPRESSOR) 50 MG tablet, Take 1 tablet (50 mg total) by mouth every 8 (eight) hours., Disp: 90 tablet, Rfl: 0    Allergies:  Review of patient's allergies indicates:  No Known Allergies        Review of Systems   Constitutional: Negative for fever.   Respiratory: Negative for cough and shortness of breath.    Cardiovascular: Positive for leg swelling. Negative for chest pain.   Gastrointestinal:  Negative for abdominal pain, constipation and diarrhea.   Musculoskeletal: Positive for gait problem.   Skin: Positive for wound.   Neurological: Negative for dizziness and headaches.       Objective:      Physical Exam   Constitutional: He is oriented to person, place, and time. He appears well-developed and well-nourished. No distress.   Eyes: No scleral icterus.   Cardiovascular: Normal rate, regular rhythm and normal heart sounds.    Pulmonary/Chest: Effort normal and breath sounds normal. No respiratory distress. He has no wheezes. He has no rales.   Abdominal: Soft. Bowel sounds are normal.   Musculoskeletal: He exhibits edema.   Neurological: He is alert and oriented to person, place, and time.   Skin: Skin is warm and dry.   Psychiatric: He has a normal mood and affect.   Vitals reviewed.      Assessment:       1. Persistent atrial fibrillation    2. Chronic systolic heart failure    3. CKD (chronic kidney disease) stage 3, GFR 30-59 ml/min    4. Peripheral edema        Plan:       Yan was seen today for follow-up.    Diagnoses and all orders for this visit:    Persistent atrial fibrillation  -     Continue current management  -     F/U with cardiology    Chronic systolic heart failure  -     Counseled on medication compliance  -     Continue metoprolol and furosemide  -     Low sodium diet, fluid restriction, daily weight   -     F/U with cardiology    CKD (chronic kidney disease) stage 3, GFR 30-59 ml/min  -     Stable    Peripheral edema  -     Continue furosemide  -     Sodium/fluid restrictions  -     Keep legs elevated    F/U with PCP in the next 2-3 months and as needed

## 2017-03-28 ENCOUNTER — OFFICE VISIT (OUTPATIENT)
Dept: CARDIOLOGY | Facility: CLINIC | Age: 77
End: 2017-03-28
Payer: MEDICARE

## 2017-03-28 ENCOUNTER — TELEPHONE (OUTPATIENT)
Dept: CARDIOLOGY | Facility: CLINIC | Age: 77
End: 2017-03-28

## 2017-03-28 VITALS
BODY MASS INDEX: 20 KG/M2 | HEART RATE: 101 BPM | SYSTOLIC BLOOD PRESSURE: 98 MMHG | WEIGHT: 135.06 LBS | HEIGHT: 69 IN | DIASTOLIC BLOOD PRESSURE: 50 MMHG

## 2017-03-28 DIAGNOSIS — I34.0 MILD MITRAL INSUFFICIENCY: ICD-10-CM

## 2017-03-28 DIAGNOSIS — I50.22 CHRONIC SYSTOLIC CONGESTIVE HEART FAILURE: Primary | ICD-10-CM

## 2017-03-28 DIAGNOSIS — I87.2 VENOUS STASIS DERMATITIS OF BOTH LOWER EXTREMITIES: ICD-10-CM

## 2017-03-28 DIAGNOSIS — N18.30 CKD (CHRONIC KIDNEY DISEASE) STAGE 3, GFR 30-59 ML/MIN: ICD-10-CM

## 2017-03-28 DIAGNOSIS — I35.1 MODERATE AORTIC INSUFFICIENCY: ICD-10-CM

## 2017-03-28 DIAGNOSIS — I48.20 CHRONIC ATRIAL FIBRILLATION: ICD-10-CM

## 2017-03-28 DIAGNOSIS — I48.91 ATRIAL FIBRILLATION WITH RVR: Chronic | ICD-10-CM

## 2017-03-28 PROCEDURE — 99999 PR PBB SHADOW E&M-EST. PATIENT-LVL III: CPT | Mod: PBBFAC,,, | Performed by: NURSE PRACTITIONER

## 2017-03-28 PROCEDURE — 1159F MED LIST DOCD IN RCRD: CPT | Mod: S$GLB,,, | Performed by: NURSE PRACTITIONER

## 2017-03-28 PROCEDURE — 1157F ADVNC CARE PLAN IN RCRD: CPT | Mod: S$GLB,,, | Performed by: NURSE PRACTITIONER

## 2017-03-28 PROCEDURE — 99214 OFFICE O/P EST MOD 30 MIN: CPT | Mod: S$GLB,,, | Performed by: NURSE PRACTITIONER

## 2017-03-28 PROCEDURE — 1160F RVW MEDS BY RX/DR IN RCRD: CPT | Mod: S$GLB,,, | Performed by: NURSE PRACTITIONER

## 2017-03-28 PROCEDURE — 1126F AMNT PAIN NOTED NONE PRSNT: CPT | Mod: S$GLB,,, | Performed by: NURSE PRACTITIONER

## 2017-03-28 RX ORDER — METOPROLOL TARTRATE 50 MG/1
50 TABLET ORAL 2 TIMES DAILY
Qty: 90 TABLET | Refills: 3 | Status: SHIPPED | OUTPATIENT
Start: 2017-03-28 | End: 2017-03-28 | Stop reason: SDUPTHER

## 2017-03-28 RX ORDER — METOPROLOL TARTRATE 50 MG/1
50 TABLET ORAL 2 TIMES DAILY
Qty: 90 TABLET | Refills: 0 | Status: SHIPPED | OUTPATIENT
Start: 2017-03-28 | End: 2017-03-28 | Stop reason: SDUPTHER

## 2017-03-28 RX ORDER — METOPROLOL TARTRATE 50 MG/1
50 TABLET ORAL 2 TIMES DAILY
Qty: 90 TABLET | Refills: 3 | Status: SHIPPED | OUTPATIENT
Start: 2017-03-28 | End: 2017-07-26 | Stop reason: SDUPTHER

## 2017-03-28 NOTE — TELEPHONE ENCOUNTER
----- Message from Mouna Herrera sent at 3/28/2017  2:14 PM CDT -----  Contact: pt   Pt returning nurses call,,, please call pt back

## 2017-03-28 NOTE — TELEPHONE ENCOUNTER
Pb with [facility] called stating she needs clarification on medication Metoprolol tartrate that was sent in to pharmacy on yesterday states she isnt understanding directions. Please advise and clarify dose and resend in.

## 2017-03-28 NOTE — TELEPHONE ENCOUNTER
----- Message from Oleg Stokes sent at 3/28/2017 12:15 PM CDT -----  Contact: Care Giver- [facility]--[phone]   Would like to consult with the nurse about Pt's Rx medication.  Please call back @ [phone].  Thanks-[facility]

## 2017-03-28 NOTE — MR AVS SNAPSHOT
[facility]  [address] [place] [address]  Phone: [phone]  Fax: [phone]                  Yan Pickering Jr.   3/28/2017 8:30 AM   Office Visit    Description:  Male : 1940   Provider:  Filipe Bloom NP   Department:  [facility]           Reason for Visit     Hospital Follow Up     Atrial Fibrillation     Congestive Heart Failure           Diagnoses this Visit        Comments    Chronic systolic congestive heart failure    -  Primary     Mild mitral insufficiency         Moderate aortic insufficiency         CKD (chronic kidney disease) stage 3, GFR 30-59 ml/min         Venous stasis dermatitis of both lower extremities         Atrial fibrillation with RVR         Chronic atrial fibrillation                To Do List           Goals (5 Years of Data)     None      Follow-Up and Disposition     Return in about 3 weeks (around 2017).       These Medications        Disp Refills Start End    metoprolol tartrate (LOPRESSOR) 50 MG tablet 90 tablet 3 3/28/2017 2017    Take 1 tablet (50 mg total) by mouth 2 (two) times daily. 50 (1 tabs) mg in the morning, 100 mg ( 2 tab) at night - Oral    Pharmacy: [facility] [address] [place] [address] #: [phone]         [facility] On Call     [facility] Nurse Care Line -  Assistance  Registered nurses in the [facility] Center provide clinical advisement, health education, appointment booking, and other advisory services.  Call for this free service at [phone].             Medications           Message regarding Medications     Verify the changes and/or additions to your medication regime listed below are the same as discussed with your clinician today.  If any of these changes or additions are incorrect, please notify your healthcare provider.        CHANGE how you are taking these medications     Start Taking Instead of    metoprolol tartrate (LOPRESSOR) 50 MG  "tablet metoprolol tartrate (LOPRESSOR) 50 MG tablet    Dosage:  Take 1 tablet (50 mg total) by mouth 2 (two) times daily. 50 (1 tabs) mg in the morning, 100 mg ( 2 tab) at night Dosage:  Take 1 tablet (50 mg total) by mouth every 8 (eight) hours.    Reason for Change:  Reorder            Verify that the below list of medications is an accurate representation of the medications you are currently taking.  If none reported, the list may be blank. If incorrect, please contact your healthcare provider. Carry this list with you in case of emergency.           Current Medications     apixaban 2.5 mg Tab Take 1 tablet (2.5 mg total) by mouth 2 (two) times daily.    aspirin 81 MG Chew Take 1 tablet (81 mg total) by mouth once daily.    furosemide (LASIX) 40 MG tablet Take 1 tablet (40 mg total) by mouth once daily.    losartan (COZAAR) 25 MG tablet Take 1 tablet (25 mg total) by mouth once daily.    metoprolol tartrate (LOPRESSOR) 50 MG tablet Take 1 tablet (50 mg total) by mouth 2 (two) times daily. 50 (1 tabs) mg in the morning, 100 mg ( 2 tab) at night           Clinical Reference Information           Your Vitals Were     BP Pulse Height Weight BMI    98/50 (BP Location: Right arm, Patient Position: Sitting, BP Method: Manual) 101 5' 9" (1.753 m) 61.3 kg (135 lb 0.5 oz) 19.94 kg/m2      Blood Pressure          Most Recent Value    BP  (!)  98/50      Allergies as of 3/28/2017     No Known Allergies      Immunizations Administered on Date of Encounter - 3/28/2017     None      Orders Placed During Today's Visit     Future Labs/Procedures Expected by Expires    Basic metabolic panel  3/28/2017 5/27/2018    Brain natriuretic peptide  3/28/2017 (Approximate) 3/28/2018      [facility] Sign-Up     Activating your [facility] account is as easy as 1-2-3!     1) Visit [email or web address], select Sign Up Now, enter this activation code and your date of birth, then select Next.  [email or web address]  Expires: 4/30/2017  3:07 PM      2) " Create a username and password to use when you visit [facility] in the future and select a security question in case you lose your password and select Next.    3) Enter your e-mail address and click Sign Up!    Additional Information  If you have questions, please e-mail [email or web address] or call [phone] to talk to our [employer] staff. Remember, [facility] is NOT to be used for urgent needs. For medical emergencies, dial 911.         Language Assistance Services     ATTENTION: Language assistance services are available, free of charge. Please call [phone].      ATENCIÓN: Si habla español, tiene a ozuna disposición servicios gratuitos de asistencia lingüística. Llame al [phone].     CHÚ Ý: N?u b?n nói Ti?ng Vi?t, có các d?ch v? h? tr? ngôn ng? mi?n phí dành cho b?n. G?i s? [phone].         [facility] complies with applicable Federal civil rights laws and does not discriminate on the basis of race, color, national origin, age, disability, or sex.

## 2017-03-28 NOTE — PROGRESS NOTES
Subjective:    Patient ID:  Yan Pickering Jr. is a 77 y.o. male who presents for follow-up of Hospital Follow Up; Atrial Fibrillation; and Congestive Heart Failure      HPI  Mr Pickering is a 77 year old male with a PMHx of A Fib, CKD, CHF, and venous stasis dermatitis of lower extremities.  He visits the clinic today for hospital follow up. He was admitted to [facility] with Acute on Chronic Systolic Heart Failure and A Fib with RVR. He had ran out of medications, some for months. Patient is feeling ok today, denies chest pain, shortness of breath, palpitations, syncope or dizziness. Vital signs are stable. Weight is down 10 lbs since 3/16/2017. 2 D Echo on 3/17/2017 moderately depressed left ventricular systolic function (EF 30-35%), normal left ventricular diastolic function. He had one episode of nose bleeding. Currently on Eliquis and ASA, will monitor for now. He is not complaint with a low sodium diet and does not limit his fluid intake.      Review of Systems   Constitution: Positive for weakness. Negative for diaphoresis, malaise/fatigue, weight gain and weight loss.   HENT: Positive for nosebleeds. Negative for congestion.    Eyes: Negative for blurred vision and double vision.   Cardiovascular: Positive for dyspnea on exertion, leg swelling and orthopnea. Negative for chest pain, claudication, cyanosis, irregular heartbeat, near-syncope, palpitations, paroxysmal nocturnal dyspnea and syncope.   Respiratory: Negative for cough, hemoptysis, shortness of breath, sputum production and wheezing.    Hematologic/Lymphatic: Negative for bleeding problem. Does not bruise/bleed easily.   Skin: Negative for rash.   Musculoskeletal: Negative for back pain, falls, joint pain, joint swelling and neck pain.   Gastrointestinal: Negative for abdominal pain, heartburn and vomiting.   Genitourinary: Negative for dysuria, frequency and hematuria.   Neurological: Negative for difficulty with concentration, dizziness, focal weakness,  light-headedness, numbness and seizures.   Psychiatric/Behavioral: Negative for depression, memory loss and substance abuse.   Allergic/Immunologic: Negative for HIV exposure and hives.           Past Medical History:   Diagnosis Date    Atrial fibrillation     CKD (chronic kidney disease) stage 3, GFR 30-59 ml/min     Mild mitral insufficiency     Moderate aortic insufficiency     Venous stasis dermatitis of both lower extremities      No past surgical history on file.  No family history on file.  Social History     Social History    Marital status: Single     Spouse name: N/A    Number of children: N/A    Years of education: N/A     Social History Main Topics    Smoking status: Never Smoker    Smokeless tobacco: Never Used    Alcohol use No    Drug use: No    Sexual activity: Not on file     Other Topics Concern    Not on file     Social History Narrative     Review of patient's allergies indicates:  No Known Allergies  Current Outpatient Prescriptions on File Prior to Visit   Medication Sig Dispense Refill    apixaban 2.5 mg Tab Take 1 tablet (2.5 mg total) by mouth 2 (two) times daily. 60 tablet 0    aspirin 81 MG Chew Take 1 tablet (81 mg total) by mouth once daily.  0    furosemide (LASIX) 40 MG tablet Take 1 tablet (40 mg total) by mouth once daily. 30 tablet 0    losartan (COZAAR) 25 MG tablet Take 1 tablet (25 mg total) by mouth once daily. 30 tablet 0    metoprolol tartrate (LOPRESSOR) 50 MG tablet Take 1 tablet (50 mg total) by mouth every 8 (eight) hours. 90 tablet 0     No current facility-administered medications on file prior to visit.      .  Objective:    Physical Exam   Constitutional: He is oriented to person, place, and time.   HENT:   Head: Normocephalic and atraumatic.   Eyes: Pupils are equal, round, and reactive to light.   Neck: Normal range of motion. Neck supple. No JVD present.   Cardiovascular: An irregular rhythm present. Exam reveals no gallop and no friction rub.   "  No murmur heard.  Pulmonary/Chest: Effort normal and breath sounds normal. No respiratory distress. He has no wheezes. He has no rales. He exhibits no tenderness.   Abdominal: Soft. Bowel sounds are normal. He exhibits no distension and no mass. There is no tenderness. There is no rebound and no guarding.   Musculoskeletal: Normal range of motion. He exhibits no edema or deformity.   Wrap to bilateral lower extremities    Neurological: He is alert and oriented to person, place, and time.   Skin: Skin is warm and dry.   Psychiatric: He has a normal mood and affect. His behavior is normal. Judgment and thought content normal.   Nursing note and vitals reviewed.                    Chemistry        Component Value Date/Time     03/20/2017 0432    K 3.9 03/20/2017 0432     03/20/2017 0432    CO2 27 03/20/2017 0432    BUN 42 (H) 03/20/2017 0432    CREATININE 1.9 (H) 03/20/2017 0432    GLU 85 03/20/2017 0432        Component Value Date/Time    CALCIUM 8.3 (L) 03/20/2017 0432    ALKPHOS 84 03/20/2017 0432    AST 22 03/20/2017 0432    ALT 26 03/20/2017 0432    BILITOT 0.8 03/20/2017 0432          Lab Results   Component Value Date    TSH 0.973 04/15/2013     Lab Results   Component Value Date    INR 1.2 03/16/2017    INR 1.1 04/21/2015     Lab Results   Component Value Date    WBC 7.78 03/20/2017    HGB 13.1 (L) 03/20/2017    HCT 41.1 03/20/2017    MCV 93 03/20/2017     03/20/2017     BP (!) 98/50 (BP Location: Right arm, Patient Position: Sitting, BP Method: Manual)  Pulse 101 Comment: radial  Ht 5' 9" (1.753 m)  Wt 61.3 kg (135 lb 0.5 oz)  BMI 19.94 kg/m2    Assessment:       1. Chronic systolic congestive heart failure    2. Mild mitral insufficiency    3. Moderate aortic insufficiency    4. CKD (chronic kidney disease) stage 3, GFR 30-59 ml/min    5. Venous stasis dermatitis of both lower extremities    6. Atrial fibrillation with RVR    7. Chronic atrial fibrillation        Patient presents for " hospital follow up. No sign of ADHF or myocardial ischemia. Wife reports have difficult giving Lopressor every 8 hours.  8 Step Program for CHF reviewed with patient and wife in detail.   They were instructed on the importance of maintaining a low sodium diet and limiting fluid intake.         Plan:       Will continue current medications and treatment plan  Risk modification   Low sodium diet of 1.5-2 grams daily and limit fluid intake to 1.5-2 liters daily  Wife have difficult giving Lopressor every 8 hours, change to Lopressor 50 mg in AM and 100 mg PM.    Follow up in clinic in 3 weeks with BMP and BNP

## 2017-03-29 RX ORDER — FUROSEMIDE 40 MG/1
40 TABLET ORAL DAILY
Qty: 30 TABLET | Refills: 11 | Status: SHIPPED | OUTPATIENT
Start: 2017-03-29 | End: 2017-07-26 | Stop reason: SDUPTHER

## 2017-03-29 RX ORDER — LOSARTAN POTASSIUM 25 MG/1
25 TABLET ORAL DAILY
Qty: 30 TABLET | Refills: 11 | Status: SHIPPED | OUTPATIENT
Start: 2017-03-29 | End: 2017-07-26 | Stop reason: SDUPTHER

## 2017-04-26 ENCOUNTER — OFFICE VISIT (OUTPATIENT)
Dept: CARDIOLOGY | Facility: CLINIC | Age: 77
End: 2017-04-26
Payer: MEDICARE

## 2017-04-26 VITALS
HEART RATE: 96 BPM | BODY MASS INDEX: 18.45 KG/M2 | WEIGHT: 124.56 LBS | DIASTOLIC BLOOD PRESSURE: 56 MMHG | HEIGHT: 69 IN | SYSTOLIC BLOOD PRESSURE: 86 MMHG

## 2017-04-26 DIAGNOSIS — I48.20 CHRONIC ATRIAL FIBRILLATION: ICD-10-CM

## 2017-04-26 DIAGNOSIS — I34.0 MILD MITRAL INSUFFICIENCY: ICD-10-CM

## 2017-04-26 DIAGNOSIS — I35.1 MODERATE AORTIC INSUFFICIENCY: ICD-10-CM

## 2017-04-26 DIAGNOSIS — I87.2 VENOUS STASIS DERMATITIS OF BOTH LOWER EXTREMITIES: ICD-10-CM

## 2017-04-26 DIAGNOSIS — I50.22 CHRONIC SYSTOLIC CONGESTIVE HEART FAILURE: ICD-10-CM

## 2017-04-26 DIAGNOSIS — N18.30 CKD (CHRONIC KIDNEY DISEASE) STAGE 3, GFR 30-59 ML/MIN: ICD-10-CM

## 2017-04-26 DIAGNOSIS — I48.91 ATRIAL FIBRILLATION WITH RVR: Primary | Chronic | ICD-10-CM

## 2017-04-26 DIAGNOSIS — I42.9 CARDIOMYOPATHY, UNSPECIFIED TYPE: ICD-10-CM

## 2017-04-26 PROCEDURE — 99999 PR PBB SHADOW E&M-EST. PATIENT-LVL III: CPT | Mod: PBBFAC,,, | Performed by: INTERNAL MEDICINE

## 2017-04-26 PROCEDURE — 93000 ELECTROCARDIOGRAM COMPLETE: CPT | Mod: S$GLB,,, | Performed by: INTERNAL MEDICINE

## 2017-04-26 PROCEDURE — 1160F RVW MEDS BY RX/DR IN RCRD: CPT | Mod: S$GLB,,, | Performed by: INTERNAL MEDICINE

## 2017-04-26 PROCEDURE — 99214 OFFICE O/P EST MOD 30 MIN: CPT | Mod: S$GLB,,, | Performed by: INTERNAL MEDICINE

## 2017-04-26 PROCEDURE — 1159F MED LIST DOCD IN RCRD: CPT | Mod: S$GLB,,, | Performed by: INTERNAL MEDICINE

## 2017-04-26 NOTE — PROGRESS NOTES
Subjective:   Patient ID:  Yan Pickering Jr. is a 77 y.o. male who presents for follow up of Atrial Fibrillation (Patient presents to clinic today for 3 week f/u. ) and Congestive Heart Failure      HPI  A 76 yo male who was non compliant presented to [facility] with afib with rvr  And cardiomyopathy negative cardiolite he finally is compliant is on appropriate  Therapy he is going to wound care due to venous insufficiency and venous blisters he is here fro f/u  he has no chf symptoms his leg swelling is remarkably improved has no orthopnea pnd has  Not been walking except around [employer]  However he has a rash now on his forearm that is itching is not responding to steroids he ahs no syncope near syncope no bleeding from eliquis.  Past Medical History:   Diagnosis Date    Atrial fibrillation     Cardiomyopathy 4/26/2017    CKD (chronic kidney disease) stage 3, GFR 30-59 ml/min     Mild mitral insufficiency     Moderate aortic insufficiency     Venous stasis dermatitis of both lower extremities        History reviewed. No pertinent surgical history.    Social History   Substance Use Topics    Smoking status: Never Smoker    Smokeless tobacco: Never Used    Alcohol use No       History reviewed. No pertinent family history.    Current Outpatient Prescriptions   Medication Sig    apixaban 2.5 mg Tab Take 1 tablet (2.5 mg total) by mouth 2 (two) times daily.    aspirin 81 MG Chew Take 1 tablet (81 mg total) by mouth once daily.    furosemide (LASIX) 40 MG tablet Take 1 tablet (40 mg total) by mouth once daily.    losartan (COZAAR) 25 MG tablet Take 1 tablet (25 mg total) by mouth once daily.    metoprolol tartrate (LOPRESSOR) 50 MG tablet Take 1 tablet (50 mg total) by mouth 2 (two) times daily. 50 (1 tabs) mg in the morning, 100 mg ( 2 tab) at night     No current facility-administered medications for this visit.      Current Outpatient Prescriptions on File Prior to Visit   Medication Sig    apixaban 2.5 mg  Tab Take 1 tablet (2.5 mg total) by mouth 2 (two) times daily.    aspirin 81 MG Chew Take 1 tablet (81 mg total) by mouth once daily.    furosemide (LASIX) 40 MG tablet Take 1 tablet (40 mg total) by mouth once daily.    losartan (COZAAR) 25 MG tablet Take 1 tablet (25 mg total) by mouth once daily.    metoprolol tartrate (LOPRESSOR) 50 MG tablet Take 1 tablet (50 mg total) by mouth 2 (two) times daily. 50 (1 tabs) mg in the morning, 100 mg ( 2 tab) at night     No current facility-administered medications on file prior to visit.      Review of patient's allergies indicates:  No Known Allergies  Review of Systems   Constitution: Negative for weakness and malaise/fatigue.   HENT: Negative for headaches.    Eyes: Negative for blurred vision.   Cardiovascular: Positive for leg swelling. Negative for chest pain, claudication, cyanosis, dyspnea on exertion, irregular heartbeat, near-syncope, orthopnea, palpitations and paroxysmal nocturnal dyspnea.   Respiratory: Negative for cough, hemoptysis and shortness of breath.    Hematologic/Lymphatic: Negative for bleeding problem. Does not bruise/bleed easily.   Skin: Positive for rash. Negative for dry skin and itching.   Musculoskeletal: Negative for falls, muscle weakness and myalgias.   Gastrointestinal: Negative for abdominal pain, diarrhea, heartburn, hematemesis, hematochezia and melena.   Genitourinary: Negative for flank pain and hematuria.   Neurological: Negative for dizziness, focal weakness, light-headedness, numbness, paresthesias and seizures.   Psychiatric/Behavioral: Negative for altered mental status and memory loss. The patient is not nervous/anxious.    Allergic/Immunologic: Negative for hives.       Objective:   Physical Exam   Constitutional: He is oriented to person, place, and time. He appears well-developed and well-nourished. No distress.   HENT:   Head: Normocephalic and atraumatic.   Eyes: EOM are normal. Pupils are equal, round, and reactive to  "light. Right eye exhibits no discharge. Left eye exhibits no discharge.   Neck: Neck supple. No JVD present. No thyromegaly present.   Cardiovascular: Normal rate, normal heart sounds and intact distal pulses.  An irregularly irregular rhythm present. Exam reveals no gallop and no friction rub.    No murmur heard.  Pulmonary/Chest: Effort normal and breath sounds normal. No respiratory distress. He has no wheezes. He has no rales. He exhibits no tenderness.   Abdominal: Soft. Bowel sounds are normal. He exhibits no distension. There is no tenderness.   Musculoskeletal: Normal range of motion. He exhibits no edema.   Legs wrapped with madie boots.   Neurological: He is alert and oriented to person, place, and time. No cranial nerve deficit.   Skin: Skin is warm and dry. No rash noted. He is not diaphoretic. No erythema.   Rash on the inner aspect of forearms bilaterally    Psychiatric: He has a normal mood and affect. His behavior is normal.   Nursing note and vitals reviewed.    Vitals:    04/26/17 1556   BP: (!) 86/56   Pulse: 96   Weight: 56.5 kg (124 lb 9 oz)   Height: 5' 9" (1.753 m)     No results found for: CHOL  No results found for: HDL  No results found for: LDLCALC  No results found for: TRIG  No results found for: CHOLHDL    Chemistry        Component Value Date/Time     03/20/2017 0432    K 3.9 03/20/2017 0432     03/20/2017 0432    CO2 27 03/20/2017 0432    BUN 42 (H) 03/20/2017 0432    CREATININE 1.9 (H) 03/20/2017 0432    GLU 85 03/20/2017 0432        Component Value Date/Time    CALCIUM 8.3 (L) 03/20/2017 0432    ALKPHOS 84 03/20/2017 0432    AST 22 03/20/2017 0432    ALT 26 03/20/2017 0432    BILITOT 0.8 03/20/2017 0432          Lab Results   Component Value Date    TSH 0.973 04/15/2013     Lab Results   Component Value Date    INR 1.2 03/16/2017    INR 1.1 04/21/2015     Lab Results   Component Value Date    WBC 7.78 03/20/2017    HGB 13.1 (L) 03/20/2017    HCT 41.1 03/20/2017    MCV 93 " 03/20/2017     03/20/2017     BMP  Sodium   Date Value Ref Range Status   03/20/2017 141 136 - 145 mmol/L Final     Potassium   Date Value Ref Range Status   03/20/2017 3.9 3.5 - 5.1 mmol/L Final     Chloride   Date Value Ref Range Status   03/20/2017 104 95 - 110 mmol/L Final     CO2   Date Value Ref Range Status   03/20/2017 27 23 - 29 mmol/L Final     BUN, Bld   Date Value Ref Range Status   03/20/2017 42 (H) 8 - 23 mg/dL Final     Creatinine   Date Value Ref Range Status   03/20/2017 1.9 (H) 0.5 - 1.4 mg/dL Final     Calcium   Date Value Ref Range Status   03/20/2017 8.3 (L) 8.7 - 10.5 mg/dL Final     Anion Gap   Date Value Ref Range Status   03/20/2017 10 8 - 16 mmol/L Final     eGFR if    Date Value Ref Range Status   03/20/2017 38 (A) >60 mL/min/1.73 m^2 Final     eGFR if non    Date Value Ref Range Status   03/20/2017 33 (A) >60 mL/min/1.73 m^2 Final     Comment:     Calculation used to obtain the estimated glomerular filtration  rate (eGFR) is the CKD-EPI equation. Since race is unknown   in our information system, the eGFR values for   -American and Non--American patients are given   for each creatinine result.       CrCl cannot be calculated (Patient has no serum creatinine result on file.).    Assessment:     1. Mild mitral insufficiency    2. Moderate aortic insufficiency    3. Venous stasis dermatitis of both lower extremities    4. CKD (chronic kidney disease) stage 3, GFR 30-59 ml/min    5. Atrial fibrillation with RVR    6. Chronic atrial fibrillation    7. Chronic systolic congestive heart failure    8. Cardiomyopathy, unspecified type    appears well compensated on appropriate therapy is not in chf watching his salt intake.   He has venous insufficiency being treated with wound care  afib may have contributed to cardiomyopathy will get ep to see  Rash needs either pcp to address vs dermatology.    Plan:     He lux ee pcp  Continue current  tehrapy   Low salt diet   Increase exercise   chf clinic in 1 month   Ep paresh.

## 2017-04-26 NOTE — MR AVS SNAPSHOT
[facility]  [address] [place] [address]  Phone: [phone]  Fax: [phone]                  Yan Pickering Jr.   2017 4:15 PM   Office Visit    Description:  Male : 1940   Provider:  Roya Dodge MD   Department:  [facility] - Cardiology           Reason for Visit     Atrial Fibrillation     Congestive Heart Failure           Diagnoses this Visit        Comments    Mild mitral insufficiency    -  Primary     Moderate aortic insufficiency         Venous stasis dermatitis of both lower extremities         CKD (chronic kidney disease) stage 3, GFR 30-59 ml/min         Atrial fibrillation with RVR         Chronic atrial fibrillation         Chronic systolic congestive heart failure         Cardiomyopathy, unspecified type                To Do List           Future Appointments        Provider Department Dept Phone    2017 10:00 AM Francisco Hall MD [facility] - Arrhythmia [phone]    2017 1:00 PM [facility]ili[facility] Bloom [facility] [phone]      Goals (5 Years of Data)     None      Follow-Up and Disposition     Return in about 1 month (around 2017).    Follow-up and Disposition History      [facility]     [facility] -  Assistance  Unless otherwise directed by your provider, please contact [facility], our nurse care line that is available for  assistance.     Registered nurses in the [facility] provide: appointment scheduling, clinical advisement, health education, and other advisory services.  Call: [phone] (toll free)               Medications           Message regarding Medications     Verify the changes and/or additions to your medication regime listed below are the same as discussed with your clinician today.  If any of these changes or additions are incorrect, please notify your healthcare provider.             Verify that the below list of medications is an accurate representation of  "the medications you are currently taking.  If none reported, the list may be blank. If incorrect, please contact your healthcare provider. Carry this list with you in case of emergency.           Current Medications     apixaban 2.5 mg Tab Take 1 tablet (2.5 mg total) by mouth 2 (two) times daily.    aspirin 81 MG Chew Take 1 tablet (81 mg total) by mouth once daily.    furosemide (LASIX) 40 MG tablet Take 1 tablet (40 mg total) by mouth once daily.    losartan (COZAAR) 25 MG tablet Take 1 tablet (25 mg total) by mouth once daily.    metoprolol tartrate (LOPRESSOR) 50 MG tablet Take 1 tablet (50 mg total) by mouth 2 (two) times daily. 50 (1 tabs) mg in the morning, 100 mg ( 2 tab) at night           Clinical Reference Information           Your Vitals Were     BP Pulse Height Weight BMI    86/56 96 5' 9" (1.753 m) 56.5 kg (124 lb 9 oz) 18.39 kg/m2      Blood Pressure          Most Recent Value    Right Arm BP - Sitting  86/56    Left Arm BP - Sitting  92/66    BP  (!)  86/56      Allergies as of 4/26/2017     No Known Allergies      Immunizations Administered on Date of Encounter - 4/26/2017     None      Orders Placed During Today's Visit      Normal Orders This Visit    Ambulatory Referral to Electrophysiology       [facility] Sign-Up     Activating your [facility] account is as easy as 1-2-3!     1) Visit [email or web address], select Sign Up Now, enter this activation code and your date of birth, then select Next.  [email or web address]  Expires: 4/30/2017  3:07 PM      2) Create a username and password to use when you visit [facility] in the future and select a security question in case you lose your password and select Next.    3) Enter your e-mail address and click Sign Up!    Additional Information  If you have questions, please e-mail [email or web address] or call [phone] to talk to our [facility] staff. Remember, [facility] is NOT to be used for urgent needs. For medical emergencies, dial 911.         Language " Assistance Services     ATTENTION: Language assistance services are available, free of charge. Please call [phone].      ATENCIÓN: Si habla dell, tiene a ozuna disposición servicios gratuitos de asistencia lingüística. Llame al [phone].     CHÚ Ý: N?u b?n nói Ti?ng Vi?t, có các d?ch v? h? tr? ngôn ng? mi?n phí dành cho b?n. G?i s? [phone].         [facility] complies with applicable Federal civil rights laws and does not discriminate on the basis of race, color, national origin, age, disability, or sex.

## 2017-05-18 ENCOUNTER — OFFICE VISIT (OUTPATIENT)
Dept: CARDIOLOGY | Facility: CLINIC | Age: 77
End: 2017-05-18
Payer: MEDICARE

## 2017-05-18 ENCOUNTER — INITIAL CONSULT (OUTPATIENT)
Dept: CARDIOLOGY | Facility: CLINIC | Age: 77
End: 2017-05-18
Payer: MEDICARE

## 2017-05-18 ENCOUNTER — LAB VISIT (OUTPATIENT)
Dept: LAB | Facility: HOSPITAL | Age: 77
End: 2017-05-18
Attending: INTERNAL MEDICINE
Payer: MEDICARE

## 2017-05-18 VITALS
BODY MASS INDEX: 17.53 KG/M2 | WEIGHT: 118.38 LBS | HEART RATE: 84 BPM | HEIGHT: 69 IN | SYSTOLIC BLOOD PRESSURE: 80 MMHG | DIASTOLIC BLOOD PRESSURE: 54 MMHG

## 2017-05-18 VITALS
HEART RATE: 84 BPM | DIASTOLIC BLOOD PRESSURE: 52 MMHG | HEIGHT: 69 IN | BODY MASS INDEX: 17.7 KG/M2 | WEIGHT: 119.5 LBS | SYSTOLIC BLOOD PRESSURE: 82 MMHG

## 2017-05-18 DIAGNOSIS — I50.22 CHRONIC SYSTOLIC CONGESTIVE HEART FAILURE: ICD-10-CM

## 2017-05-18 DIAGNOSIS — I42.9 CARDIOMYOPATHY, UNSPECIFIED TYPE: ICD-10-CM

## 2017-05-18 DIAGNOSIS — I34.0 MILD MITRAL INSUFFICIENCY: ICD-10-CM

## 2017-05-18 DIAGNOSIS — I48.20 CHRONIC ATRIAL FIBRILLATION: Primary | ICD-10-CM

## 2017-05-18 DIAGNOSIS — I48.91 ATRIAL FIBRILLATION WITH RVR: Chronic | ICD-10-CM

## 2017-05-18 DIAGNOSIS — N18.30 CKD (CHRONIC KIDNEY DISEASE) STAGE 3, GFR 30-59 ML/MIN: ICD-10-CM

## 2017-05-18 DIAGNOSIS — I35.1 MODERATE AORTIC INSUFFICIENCY: ICD-10-CM

## 2017-05-18 DIAGNOSIS — I87.2 VENOUS STASIS DERMATITIS OF BOTH LOWER EXTREMITIES: ICD-10-CM

## 2017-05-18 DIAGNOSIS — I48.91 ATRIAL FIBRILLATION WITH RVR: Primary | Chronic | ICD-10-CM

## 2017-05-18 LAB
ANION GAP SERPL CALC-SCNC: 11 MMOL/L
BNP SERPL-MCNC: 1161 PG/ML
BUN SERPL-MCNC: 63 MG/DL
CALCIUM SERPL-MCNC: 9.3 MG/DL
CHLORIDE SERPL-SCNC: 106 MMOL/L
CO2 SERPL-SCNC: 26 MMOL/L
CREAT SERPL-MCNC: 3 MG/DL
EST. GFR  (AFRICAN AMERICAN): 21.8 ML/MIN/1.73 M^2
EST. GFR  (NON AFRICAN AMERICAN): 18.9 ML/MIN/1.73 M^2
GLUCOSE SERPL-MCNC: 108 MG/DL
POTASSIUM SERPL-SCNC: 4.5 MMOL/L
SODIUM SERPL-SCNC: 143 MMOL/L

## 2017-05-18 PROCEDURE — 1160F RVW MEDS BY RX/DR IN RCRD: CPT | Mod: S$GLB,,, | Performed by: PHYSICIAN ASSISTANT

## 2017-05-18 PROCEDURE — 1126F AMNT PAIN NOTED NONE PRSNT: CPT | Mod: S$GLB,,, | Performed by: PHYSICIAN ASSISTANT

## 2017-05-18 PROCEDURE — 99205 OFFICE O/P NEW HI 60 MIN: CPT | Mod: S$GLB,,, | Performed by: INTERNAL MEDICINE

## 2017-05-18 PROCEDURE — 83880 ASSAY OF NATRIURETIC PEPTIDE: CPT

## 2017-05-18 PROCEDURE — 99999 PR PBB SHADOW E&M-EST. PATIENT-LVL II: CPT | Mod: PBBFAC,,, | Performed by: INTERNAL MEDICINE

## 2017-05-18 PROCEDURE — 1160F RVW MEDS BY RX/DR IN RCRD: CPT | Mod: S$GLB,,, | Performed by: INTERNAL MEDICINE

## 2017-05-18 PROCEDURE — 99999 PR PBB SHADOW E&M-EST. PATIENT-LVL III: CPT | Mod: PBBFAC,,, | Performed by: PHYSICIAN ASSISTANT

## 2017-05-18 PROCEDURE — 80048 BASIC METABOLIC PNL TOTAL CA: CPT

## 2017-05-18 PROCEDURE — 99214 OFFICE O/P EST MOD 30 MIN: CPT | Mod: S$GLB,,, | Performed by: PHYSICIAN ASSISTANT

## 2017-05-18 PROCEDURE — 1159F MED LIST DOCD IN RCRD: CPT | Mod: S$GLB,,, | Performed by: PHYSICIAN ASSISTANT

## 2017-05-18 PROCEDURE — 1159F MED LIST DOCD IN RCRD: CPT | Mod: S$GLB,,, | Performed by: INTERNAL MEDICINE

## 2017-05-18 PROCEDURE — 36415 COLL VENOUS BLD VENIPUNCTURE: CPT

## 2017-05-18 NOTE — PROGRESS NOTES
Subjective:    Patient ID:  Yan Pickering Jr. is a 77 y.o. male who presents for evaluation of Atrial Fibrillation (consult)      HPI Comments: 77 yoM persistent AF, cardiomyopathy (arrhythmia induced?) here for AF management. He was found to be in AF/RVR 4/15. At that time he was treated with a rate control strategy and placed on apixaban for CVA prophylaxis. He soon after diagnosis stopped his medical therapy. Over the next 2 years he developed progressive HF symptoms and 3/17 had newly diagnosed systolic dysfunction, EF 30-35%. He remained in AF on all ECGs. He was started on losartan and restarted on metoprolol. He complains of weight fluctuations, LE edema and alvarez. He has never undergone CV or ablation attempt. No history of TIA/CVA, DM    Echo 3/17:  CONCLUSIONS     1 - Biatrial enlargement.     2 - Moderately depressed left ventricular systolic function (EF 30-35%).     3 - Normal left ventricular diastolic function.     4 - Right ventricular enlargement with mildly depressed systolic function.     5 - The estimated PA systolic pressure is greater than 40 mmHg.     6 - Mild aortic regurgitation.     7 - Moderate mitral regurgitation.     8 - Small pericardial effusion.     NM Stress 2/17:    Nuclear Quantitative Functional Analysis:   LVEF: 27 %  Impression: NORMAL MYOCARDIAL PERFUSION  1. The perfusion scan is free of evidence for myocardial ischemia or injury.   2. There is a mild intensity fixed defect in the inferior wall of the left ventricle, secondary to diaphragm attenuation.   3. There is abnormal wall motion at rest showing moderate hypokinesis of the  wall of the left ventricle.   4. There is resting LV dysfunction with a reduced ejection fraction of 27 %.   5. The ventricular volumes are normal at rest and stress.   6. The extracardiac distribution of radioactivity is normal.     Past Medical History:  No date: Atrial fibrillation  4/26/2017: Cardiomyopathy  No date: CKD (chronic kidney disease)  stage 3, GFR 30-5*  No date: Mild mitral insufficiency  No date: Moderate aortic insufficiency  No date: Venous stasis dermatitis of both lower extremi*    History reviewed. No pertinent surgical history.    Social History    Marital status: Single              Spouse name:                       Years of education:                 Number of children:               Occupational History    None on file    Social History Main Topics    Smoking status: Never Smoker                                                                Smokeless status: Never Used                        Alcohol use: No              Drug use: No              Sexual activity: Not on file          Other Topics            Concern    None on file    Social History Narrative    None on file    History reviewed.  No pertinent family history.      Atrial Fibrillation   Symptoms include palpitations and weakness. Symptoms are negative for chest pain, dizziness, shortness of breath and syncope. Past medical history includes atrial fibrillation.       Review of Systems   Constitution: Positive for weakness and malaise/fatigue.   HENT: Negative.    Eyes: Negative.    Cardiovascular: Positive for irregular heartbeat and palpitations. Negative for chest pain, dyspnea on exertion, leg swelling, near-syncope and syncope.   Respiratory: Negative.  Negative for shortness of breath.    Endocrine: Negative.    Hematologic/Lymphatic: Negative.    Skin: Negative.    Musculoskeletal: Negative.    Gastrointestinal: Negative.    Genitourinary: Negative.    Neurological: Negative for dizziness and light-headedness.   Psychiatric/Behavioral: Negative.    Allergic/Immunologic: Negative.         Objective:    Physical Exam   Constitutional: He is oriented to person, place, and time. He appears well-developed and well-nourished. No distress.   HENT:   Head: Normocephalic and atraumatic.   Eyes: EOM are normal. Pupils are equal, round, and reactive to light.   Neck: Normal  range of motion. No JVD present. No thyromegaly present.   Cardiovascular: Normal rate, S1 normal, S2 normal and normal heart sounds.  An irregularly irregular rhythm present. PMI is not displaced.  Exam reveals no gallop and no friction rub.    No murmur heard.  Pulmonary/Chest: Effort normal and breath sounds normal. No respiratory distress. He has no wheezes. He has no rales.   Abdominal: Soft. Bowel sounds are normal. He exhibits no distension. There is no tenderness. There is no rebound and no guarding.   Musculoskeletal: Normal range of motion. He exhibits edema. He exhibits no tenderness.   Neurological: He is alert and oriented to person, place, and time. No cranial nerve deficit.   Skin: Skin is warm and dry. No rash noted. No erythema.   Psychiatric: He has a normal mood and affect. His behavior is normal. Judgment and thought content normal.     ECG: AF with RVR nl QRS        Assessment:       1. Atrial fibrillation with RVR    2. Cardiomyopathy, unspecified type         Plan:       77 yoM persistent AF/RVR with new onset cardiomyopathy here for evaluation. I discussed AF and its basic pathophysiology, including its health implications and treatment options with the patient. Specifically, I addressed the need for CVA prophylaxis as well as the goal to reduce symptomatic arrhythmic episodes by pharmacologic and/or procedural methods. 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 I recommended SEVERINO/CV. He is reluctant to undergo any procedures. I strongly advised that he undergo the CV due to the likely AF-associated CM however he wishes to consider his options. Will await his decision.

## 2017-05-18 NOTE — PROGRESS NOTES
Subjective:    Patient ID:  Yan Pickering Jr. is a 77 y.o. male who presents for follow-up of CHF    HPI   Mr. Pickering is a 77 year old male patient with a PMHx of atrial fibrillation, cardiomyopathy, systolic CHF, and CVI who presents today for follow-up of CHF. Patient returns today and states he is doing well. Reports he had some diarrhea yesterday, seems to be resolving. No cardiac complaints. Denies any chest pain, SOB, or other anginal signs or symptoms. CHF wise, appears clinically compensated. Denies any PND, orthopnea, abdominal bloating, or leg swelling. Weight continues to decrease. Atrial fibrillation wise, patient appears stable. He denies any lightheadedness, dizziness, near syncope, or syncope. Remains on Eliquis for CVA prophylaxis. No bleeding issues. He reports compliance with his medications. BP on low side, but this is chronic and patient asymptomatic. Patient reports venous blisters on legs have nearly healed.    Saw EP today, Dr. Hall.     Review of Systems   Constitution: Negative for chills, decreased appetite, fever, weakness and malaise/fatigue.   HENT: Negative for congestion, headaches, hoarse voice and sore throat.    Eyes: Negative for blurred vision and discharge.   Cardiovascular: Negative for chest pain, claudication, cyanosis, dyspnea on exertion, irregular heartbeat, leg swelling, near-syncope, orthopnea, palpitations and paroxysmal nocturnal dyspnea.   Respiratory: Negative for cough, hemoptysis, shortness of breath, snoring, sputum production and wheezing.    Endocrine: Negative for cold intolerance and heat intolerance.   Hematologic/Lymphatic: Negative for bleeding problem. Does not bruise/bleed easily.   Skin: Negative for rash.   Musculoskeletal: Negative for arthritis, back pain, joint pain, joint swelling, muscle cramps, muscle weakness and myalgias.   Gastrointestinal: Positive for diarrhea. Negative for abdominal pain, constipation, heartburn, melena and nausea.  "  Genitourinary: Negative for hematuria.   Neurological: Negative for dizziness, focal weakness, light-headedness, loss of balance, numbness, paresthesias and seizures.   Psychiatric/Behavioral: Negative for memory loss. The patient does not have insomnia.    Allergic/Immunologic: Negative for hives.       BP (!) 82/52 (BP Location: Right arm, Patient Position: Sitting, BP Method: Manual)  Pulse 84  Ht 5' 9" (1.753 m)  Wt 54.2 kg (119 lb 7.8 oz)  BMI 17.65 kg/m2    Objective:    Physical Exam   Constitutional: He is oriented to person, place, and time. He appears well-developed and well-nourished. No distress.   HENT:   Head: Normocephalic and atraumatic.   Eyes: Pupils are equal, round, and reactive to light. Right eye exhibits no discharge. Left eye exhibits no discharge.   Neck: Neck supple. No JVD present. No tracheal deviation present. No thyromegaly present.   Cardiovascular: Normal rate, S1 normal, S2 normal and normal heart sounds.  An irregularly irregular rhythm present. PMI is not displaced.  Exam reveals no gallop, no S3, no S4 and no friction rub.    No murmur heard.  Pulses:       Radial pulses are 2+ on the right side, and 2+ on the left side.   Pulmonary/Chest: Effort normal and breath sounds normal. No respiratory distress. He has no wheezes. He has no rales.   Abdominal: Soft. He exhibits no distension. There is no tenderness. There is no rebound.   Musculoskeletal: He exhibits no edema.   Neurological: He is alert and oriented to person, place, and time.   Skin: Skin is warm and dry. He is not diaphoretic. No erythema.   Psychiatric: He has a normal mood and affect. His behavior is normal. Thought content normal.   Nursing note and vitals reviewed.          Chemistry        Component Value Date/Time     03/20/2017 0432    K 3.9 03/20/2017 0432     03/20/2017 0432    CO2 27 03/20/2017 0432    BUN 42 (H) 03/20/2017 0432    CREATININE 1.9 (H) 03/20/2017 0432    GLU 85 03/20/2017 0432 "        Component Value Date/Time    CALCIUM 8.3 (L) 03/20/2017 0432    ALKPHOS 84 03/20/2017 0432    AST 22 03/20/2017 0432    ALT 26 03/20/2017 0432    BILITOT 0.8 03/20/2017 0432        No results found for: CHOL  No results found for: HDL  No results found for: LDLCALC  No results found for: TRIG  No results found for: CHOLHDL      Impression: NORMAL MYOCARDIAL PERFUSION  1. The perfusion scan is free of evidence for myocardial ischemia or injury.   2. There is a mild intensity fixed defect in the inferior wall of the left ventricle, secondary to diaphragm attenuation.   3. There is abnormal wall motion at rest showing moderate hypokinesis of the  wall of the left ventricle.   4. There is resting LV dysfunction with a reduced ejection fraction of 27 %.   5. The ventricular volumes are normal at rest and stress.   6. The extracardiac distribution of radioactivity is normal.     2D Echo CONCLUSIONS     1 - Biatrial enlargement.     2 - Moderately depressed left ventricular systolic function (EF 30-35%).     3 - Normal left ventricular diastolic function.     4 - Right ventricular enlargement with mildly depressed systolic function.     5 - The estimated PA systolic pressure is greater than 40 mmHg.     6 - Mild aortic regurgitation.     7 - Moderate mitral regurgitation.     8 - Small pericardial effusion.   Assessment:       1. Chronic atrial fibrillation    2. Moderate aortic insufficiency    3. Mild mitral insufficiency    4. CKD (chronic kidney disease) stage 3, GFR 30-59 ml/min    5. Venous stasis dermatitis of both lower extremities    6. Atrial fibrillation with RVR    7. Cardiomyopathy, unspecified type    8. Chronic systolic congestive heart failure       Doing clinically well. No cardiac complaints. CHF appears clinically well compensated. Tolerating meds. Will check BMP, BNP today with phone review. Saw EP earlier, considering DCCV.  Plan:   -BMP, BNP today with phone review  -Continue current  medications  -Cardiac low salt diet; 2 gram Na/day  -Follow-up 3 months, sooner should issues arise      Chart reviewed. Dr. Dodge agrees with plan as outlined above.

## 2017-05-18 NOTE — MR AVS SNAPSHOT
[facility]  [address] [place] [id number]  Phone: [phone]  Fax: [phone]                  Yan Pickering Jr.   2017 1:00 PM   Office Visit    Description:  Male : 1940   Provider:  HOWIE Amador   Department:  [facility]           Diagnoses this Visit        Comments    Chronic atrial fibrillation    -  Primary     Moderate aortic insufficiency         Mild mitral insufficiency         CKD (chronic kidney disease) stage 3, GFR 30-59 ml/min         Venous stasis dermatitis of both lower extremities         Atrial fibrillation with RVR         Cardiomyopathy, unspecified type         Chronic systolic congestive heart failure                To Do List           Goals (5 Years of Data)     None      [facility] On Call     [facility] On Call Nurse Care Line -  Assistance  Unless otherwise directed by your provider, please contact [facility], our nurse care line that is available for  assistance.     Registered nurses in the [facility] On Call Center provide: appointment scheduling, clinical advisement, health education, and other advisory services.  Call: [phone] (toll free)               Medications           Message regarding Medications     Verify the changes and/or additions to your medication regime listed below are the same as discussed with your clinician today.  If any of these changes or additions are incorrect, please notify your healthcare provider.             Verify that the below list of medications is an accurate representation of the medications you are currently taking.  If none reported, the list may be blank. If incorrect, please contact your healthcare provider. Carry this list with you in case of emergency.           Current Medications     apixaban 2.5 mg Tab Take 1 tablet (2.5 mg total) by mouth 2 (two) times daily.    aspirin 81 MG Chew Take 1 tablet (81 mg total) by mouth once daily.    furosemide (LASIX) 40 MG  "tablet Take 1 tablet (40 mg total) by mouth once daily.    losartan (COZAAR) 25 MG tablet Take 1 tablet (25 mg total) by mouth once daily.    metoprolol tartrate (LOPRESSOR) 50 MG tablet Take 1 tablet (50 mg total) by mouth 2 (two) times daily. 50 (1 tabs) mg in the morning, 100 mg ( 2 tab) at night           Clinical Reference Information           Your Vitals Were     BP Pulse Height Weight BMI    82/52 (BP Location: Right arm, Patient Position: Sitting, BP Method: Manual) 84 5' 9" (1.753 m) 54.2 kg (119 lb 7.8 oz) 17.65 kg/m2      Blood Pressure          Most Recent Value    Right Arm BP - Sitting  82/52    Left Arm BP - Sitting  78/48    BP  (!)  82/52      Allergies as of 5/18/2017     No Known Allergies      Immunizations Administered on Date of Encounter - 5/18/2017     None      Orders Placed During Today's Visit     Future Labs/Procedures Expected by Expires    Basic metabolic panel  5/18/2017 7/17/2018    Brain natriuretic peptide  5/18/2017 7/17/2018      [facility] Sign-Up     Activating your [facility] account is as easy as 1-2-3!     1) Visit [email or web address], select Sign Up Now, enter this activation code and your date of birth, then select Next.  [email or web address]  Expires: 7/2/2017  1:16 PM      2) Create a username and password to use when you visit [facility] in the future and select a security question in case you lose your password and select Next.    3) Enter your e-mail address and click Sign Up!    Additional Information  If you have questions, please e-mail [email or web address] or call [phone] to talk to our [facility] staff. Remember, [facility] is NOT to be used for urgent needs. For medical emergencies, dial 911.         Language Assistance Services     ATTENTION: Language assistance services are available, free of charge. Please call [phone].      ATENCIÓN: Si habla español, tiene a ozuna disposición servicios gratuitos de asistencia lingüística. Llame al [phone].     JOSELYN SOLANO: " N?u b?n nói Ti?ng Vi?t, có các d?ch v? h? tr? ngôn ng? mi?n phí dành cho b?n. G?i s? [phone].         [facility] complies with applicable Federal civil rights laws and does not discriminate on the basis of race, color, national origin, age, disability, or sex.

## 2017-05-18 NOTE — LETTER
May 18, 2017      Roya Dodge MD  [address]  [place] [address]           [facility]  [address] [place] [address] , [address]  [place] [address]  Phone: [phone]  Fax: [phone]          Patient: Yan Pickering Jr.   MR Number: [id number]   YOB: 1940   Date of Visit: 5/18/2017       Dear Dr. Roya Dodge:    Thank you for referring Yan Pickering to me for evaluation. Attached you will find relevant portions of my assessment and plan of care.    If you have questions, please do not hesitate to call me. I look forward to following Yan Pickering along with you.    Sincerely,    Francisco Hall MD    Enclosure  CC:  No Recipients    If you would like to receive this communication electronically, please contact [email or web address] or [phone] to request more information on [employer] Link access.    For providers and/or their staff who would like to refer a patient to [facility], please contact us through our one-stop-shop provider referral line, [facility], at [phone].    If you feel you have received this communication in error or would no longer like to receive these types of communications, please e-mail [email or web address]

## 2017-05-19 ENCOUNTER — TELEPHONE (OUTPATIENT)
Dept: CARDIOLOGY | Facility: CLINIC | Age: 77
End: 2017-05-19

## 2017-05-19 DIAGNOSIS — I50.9 CONGESTIVE HEART FAILURE, UNSPECIFIED CONGESTIVE HEART FAILURE CHRONICITY, UNSPECIFIED CONGESTIVE HEART FAILURE TYPE: ICD-10-CM

## 2017-05-19 DIAGNOSIS — N18.30 CKD (CHRONIC KIDNEY DISEASE) STAGE 3, GFR 30-59 ML/MIN: Primary | ICD-10-CM

## 2017-05-19 NOTE — TELEPHONE ENCOUNTER
Called to patient--gave results of labs and order to hold Lasix--also informed that labs have beem scheduled on

## 2017-05-19 NOTE — TELEPHONE ENCOUNTER
----- Message from HOWIE Amador sent at 5/19/2017  7:53 AM CDT -----  Please phone patient. Labs reviewed. His creatinine has bumped to 3.0. BNP is still elevated but much lower than previous. He needs to HOLD his Lasix through the weekend. If he is feeling bad, would advise he go to ED. Otherwise we can plan for repeat BMP on Monday to re-check renal function. He should hold Lasix until we can review lab on Monday.    Thanks

## 2017-05-22 ENCOUNTER — LAB VISIT (OUTPATIENT)
Dept: LAB | Facility: HOSPITAL | Age: 77
End: 2017-05-22
Attending: NURSE PRACTITIONER
Payer: MEDICARE

## 2017-05-22 ENCOUNTER — TELEPHONE (OUTPATIENT)
Dept: CARDIOLOGY | Facility: CLINIC | Age: 77
End: 2017-05-22

## 2017-05-22 DIAGNOSIS — I50.9 CONGESTIVE HEART FAILURE, UNSPECIFIED CONGESTIVE HEART FAILURE CHRONICITY, UNSPECIFIED CONGESTIVE HEART FAILURE TYPE: ICD-10-CM

## 2017-05-22 LAB
ANION GAP SERPL CALC-SCNC: 9 MMOL/L
BUN SERPL-MCNC: 50 MG/DL
CALCIUM SERPL-MCNC: 9.7 MG/DL
CHLORIDE SERPL-SCNC: 106 MMOL/L
CO2 SERPL-SCNC: 26 MMOL/L
CREAT SERPL-MCNC: 2.3 MG/DL
EST. GFR  (AFRICAN AMERICAN): 30 ML/MIN/1.73 M^2
EST. GFR  (NON AFRICAN AMERICAN): 26 ML/MIN/1.73 M^2
GLUCOSE SERPL-MCNC: 96 MG/DL
POTASSIUM SERPL-SCNC: 5.1 MMOL/L
SODIUM SERPL-SCNC: 141 MMOL/L

## 2017-05-22 PROCEDURE — 80048 BASIC METABOLIC PNL TOTAL CA: CPT

## 2017-05-22 PROCEDURE — 36415 COLL VENOUS BLD VENIPUNCTURE: CPT

## 2017-05-22 NOTE — TELEPHONE ENCOUNTER
----- Message from HOWIE Amador sent at 5/22/2017 12:42 PM CDT -----  Please phone patient. His creatinine has improved to 2.3 but still elevated. He needs to continue holding his Lasix for now. I entered a referral for nephrology, anyway they can see him this week?

## 2017-05-22 NOTE — TELEPHONE ENCOUNTER
Spoke with pt and luis m notified both the information pt states he will think about scheduling appt with nephrology and call back if he decides to schedule appt.

## 2017-05-22 NOTE — TELEPHONE ENCOUNTER
Noted. He will need to be seen in clinic this week then to further evaluate him since we are holding his Lasix due to bumped creatinine. Please arrange appt

## 2017-05-23 ENCOUNTER — OFFICE VISIT (OUTPATIENT)
Dept: CARDIOLOGY | Facility: CLINIC | Age: 77
End: 2017-05-23
Payer: MEDICARE

## 2017-05-23 VITALS
HEART RATE: 100 BPM | WEIGHT: 124.25 LBS | DIASTOLIC BLOOD PRESSURE: 50 MMHG | SYSTOLIC BLOOD PRESSURE: 84 MMHG | HEIGHT: 69 IN | BODY MASS INDEX: 18.4 KG/M2

## 2017-05-23 DIAGNOSIS — I42.9 CARDIOMYOPATHY, UNSPECIFIED TYPE: ICD-10-CM

## 2017-05-23 DIAGNOSIS — I87.2 VENOUS STASIS DERMATITIS OF BOTH LOWER EXTREMITIES: ICD-10-CM

## 2017-05-23 DIAGNOSIS — I50.9 CONGESTIVE HEART FAILURE, UNSPECIFIED CONGESTIVE HEART FAILURE CHRONICITY, UNSPECIFIED CONGESTIVE HEART FAILURE TYPE: Primary | ICD-10-CM

## 2017-05-23 DIAGNOSIS — I35.1 MODERATE AORTIC INSUFFICIENCY: ICD-10-CM

## 2017-05-23 DIAGNOSIS — I50.22 CHRONIC SYSTOLIC CONGESTIVE HEART FAILURE: Primary | ICD-10-CM

## 2017-05-23 DIAGNOSIS — N18.30 CKD (CHRONIC KIDNEY DISEASE) STAGE 3, GFR 30-59 ML/MIN: ICD-10-CM

## 2017-05-23 DIAGNOSIS — I48.20 CHRONIC ATRIAL FIBRILLATION: ICD-10-CM

## 2017-05-23 PROCEDURE — 1159F MED LIST DOCD IN RCRD: CPT | Mod: S$GLB,,, | Performed by: NURSE PRACTITIONER

## 2017-05-23 PROCEDURE — 99214 OFFICE O/P EST MOD 30 MIN: CPT | Mod: S$GLB,,, | Performed by: NURSE PRACTITIONER

## 2017-05-23 PROCEDURE — 1126F AMNT PAIN NOTED NONE PRSNT: CPT | Mod: S$GLB,,, | Performed by: NURSE PRACTITIONER

## 2017-05-23 PROCEDURE — 99999 PR PBB SHADOW E&M-EST. PATIENT-LVL III: CPT | Mod: PBBFAC,,, | Performed by: NURSE PRACTITIONER

## 2017-05-23 NOTE — PROGRESS NOTES
Subjective:   Patient ID:  Yan Pickering Jr. is a 77 y.o. male who presents for follow up of Congestive Heart Failure      HPI   PMHx of atrial fibrillation, cardiomyopathy, systolic CHF 30% moderate MR, and CVI who presents today for follow-up of CHF. He was seen recently and noted to have bump in creatine to 3.0. Improved on repeat yesterday to 2.3.  He had diarrhea about 6-7 hours of diarrhea around the time that the creatine bumped, but has resolved. BNP 2868 two months ago and repeat earlier this month shows improvement to 1161. Denies any chest pain, SOB, or other anginal signs or symptoms. Denies any PND, orthopnea, abdominal bloating, or leg swelling. Weight is up from previous visit, but he is asymptomatic.  He has no issues with palpitations. He denies any lightheadedness, dizziness, near syncope, or syncope. Being treated for venous insufficiency. Was treated by wound care, but being discharged on Wednesday.  He is on Eliquis for CVA prophylaxis with no abnormal bleeding.  NO CNS complaints to suggest TIA or CVA. BP is chronically low and is asymptomatic.     Past Medical History:   Diagnosis Date    Atrial fibrillation     Cardiomyopathy 4/26/2017    CKD (chronic kidney disease) stage 3, GFR 30-59 ml/min     Mild mitral insufficiency     Moderate aortic insufficiency     Venous stasis dermatitis of both lower extremities        No past surgical history on file.    Social History   Substance Use Topics    Smoking status: Never Smoker    Smokeless tobacco: Never Used    Alcohol use No       No family history on file.    Current Outpatient Prescriptions   Medication Sig    apixaban 2.5 mg Tab Take 1 tablet (2.5 mg total) by mouth 2 (two) times daily.    aspirin 81 MG Chew Take 1 tablet (81 mg total) by mouth once daily.    losartan (COZAAR) 25 MG tablet Take 1 tablet (25 mg total) by mouth once daily.    metoprolol tartrate (LOPRESSOR) 50 MG tablet Take 1 tablet (50 mg total) by mouth 2 (two)  times daily. 50 (1 tabs) mg in the morning, 100 mg ( 2 tab) at night    furosemide (LASIX) 40 MG tablet Take 1 tablet (40 mg total) by mouth once daily.     No current facility-administered medications for this visit.      Current Outpatient Prescriptions on File Prior to Visit   Medication Sig    apixaban 2.5 mg Tab Take 1 tablet (2.5 mg total) by mouth 2 (two) times daily.    aspirin 81 MG Chew Take 1 tablet (81 mg total) by mouth once daily.    losartan (COZAAR) 25 MG tablet Take 1 tablet (25 mg total) by mouth once daily.    metoprolol tartrate (LOPRESSOR) 50 MG tablet Take 1 tablet (50 mg total) by mouth 2 (two) times daily. 50 (1 tabs) mg in the morning, 100 mg ( 2 tab) at night    furosemide (LASIX) 40 MG tablet Take 1 tablet (40 mg total) by mouth once daily.     No current facility-administered medications on file prior to visit.        Review of Systems   Constitution: Negative for decreased appetite, weakness, malaise/fatigue, weight gain and weight loss.   HENT: Negative for nosebleeds.    Cardiovascular: Negative for chest pain, claudication, dyspnea on exertion, irregular heartbeat, leg swelling, near-syncope, orthopnea, palpitations, paroxysmal nocturnal dyspnea and syncope.   Respiratory: Negative for cough, shortness of breath, sleep disturbances due to breathing, snoring and wheezing.    Hematologic/Lymphatic: Negative for bleeding problem. Does not bruise/bleed easily.   Skin: Negative for rash.   Musculoskeletal: Negative for arthritis, back pain, falls, joint pain, joint swelling, muscle cramps, muscle weakness and myalgias.   Gastrointestinal: Negative for bloating, abdominal pain, constipation, diarrhea, heartburn, nausea and vomiting.   Genitourinary: Negative for dysuria, hematuria and nocturia.   Neurological: Negative for excessive daytime sleepiness, dizziness, light-headedness, loss of balance, numbness, paresthesias and vertigo.       Objective:   Physical Exam   Constitutional:  "He is oriented to person, place, and time. He appears well-developed and well-nourished.   Neck: Neck supple. No JVD present.   Cardiovascular: Normal rate, normal heart sounds and normal pulses.  An irregularly irregular rhythm present. Exam reveals no friction rub.    No murmur heard.  Pulmonary/Chest: Effort normal and breath sounds normal. No respiratory distress. He has no wheezes. He has no rales.   Abdominal: Soft. Bowel sounds are normal. He exhibits no distension.   Musculoskeletal: He exhibits no edema or tenderness.   Neurological: He is alert and oriented to person, place, and time.   Skin: Skin is warm and dry. No rash noted.   Psychiatric: He has a normal mood and affect. His behavior is normal.   Nursing note and vitals reviewed.    Vitals:    05/23/17 1316   BP: (!) 84/50   BP Location: Right arm   Patient Position: Sitting   BP Method: Manual   Pulse: 100   Weight: 56.4 kg (124 lb 3.7 oz)   Height: 5' 9" (1.753 m)     No results found for: CHOL  No results found for: HDL  No results found for: LDLCALC  No results found for: TRIG  No results found for: CHOLHDL    Chemistry        Component Value Date/Time     05/22/2017 0953    K 5.1 05/22/2017 0953     05/22/2017 0953    CO2 26 05/22/2017 0953    BUN 50 (H) 05/22/2017 0953    CREATININE 2.3 (H) 05/22/2017 0953    GLU 96 05/22/2017 0953        Component Value Date/Time    CALCIUM 9.7 05/22/2017 0953    ALKPHOS 84 03/20/2017 0432    AST 22 03/20/2017 0432    ALT 26 03/20/2017 0432    BILITOT 0.8 03/20/2017 0432          Lab Results   Component Value Date    TSH 0.973 04/15/2013     Lab Results   Component Value Date    INR 1.2 03/16/2017    INR 1.1 04/21/2015     Lab Results   Component Value Date    WBC 7.78 03/20/2017    HGB 13.1 (L) 03/20/2017    HCT 41.1 03/20/2017    MCV 93 03/20/2017     03/20/2017     BMP  Sodium   Date Value Ref Range Status   05/22/2017 141 136 - 145 mmol/L Final     Potassium   Date Value Ref Range Status "   05/22/2017 5.1 3.5 - 5.1 mmol/L Final     Chloride   Date Value Ref Range Status   05/22/2017 106 95 - 110 mmol/L Final     CO2   Date Value Ref Range Status   05/22/2017 26 23 - 29 mmol/L Final     BUN, Bld   Date Value Ref Range Status   05/22/2017 50 (H) 8 - 23 mg/dL Final     Creatinine   Date Value Ref Range Status   05/22/2017 2.3 (H) 0.5 - 1.4 mg/dL Final     Calcium   Date Value Ref Range Status   05/22/2017 9.7 8.7 - 10.5 mg/dL Final     Anion Gap   Date Value Ref Range Status   05/22/2017 9 8 - 16 mmol/L Final     eGFR if    Date Value Ref Range Status   05/22/2017 30 (A) >60 mL/min/1.73 m^2 Final     eGFR if non    Date Value Ref Range Status   05/22/2017 26 (A) >60 mL/min/1.73 m^2 Final     Comment:     Calculation used to obtain the estimated glomerular filtration  rate (eGFR) is the CKD-EPI equation. Since race is unknown   in our information system, the eGFR values for   -American and Non--American patients are given   for each creatinine result.       Estimated Creatinine Clearance: 20.8 mL/min (based on Cr of 2.3).    Assessment:     1. Chronic systolic congestive heart failure    2. Moderate aortic insufficiency    3. Chronic atrial fibrillation    4. Cardiomyopathy, unspecified type    5. CKD (chronic kidney disease) stage 3, GFR 30-59 ml/min    6. Venous stasis dermatitis of both lower extremities    Has no evidence of volume overload on exam  BP is chronically low and is asymptomatic. He is on metoprolol 50mg BID for A-fib control  Renal function improved on repeat BMP  Eliquis for CVA prophylaxis with no abnormal bleeding.  NO CNS complaints to suggest TIA or CVA    Plan:   Will have him continue to hold Lasix and use PRN.   Continue current dose of metoprolol for A-fib control  Continue Eliquis for CVA prophylaxis  RTC in 2 months for CHF clinic with BMP and BNP

## 2017-07-26 ENCOUNTER — LAB VISIT (OUTPATIENT)
Dept: LAB | Facility: HOSPITAL | Age: 77
End: 2017-07-26
Attending: NURSE PRACTITIONER
Payer: MEDICARE

## 2017-07-26 ENCOUNTER — OFFICE VISIT (OUTPATIENT)
Dept: CARDIOLOGY | Facility: CLINIC | Age: 77
End: 2017-07-26
Payer: MEDICARE

## 2017-07-26 VITALS
SYSTOLIC BLOOD PRESSURE: 100 MMHG | BODY MASS INDEX: 18.47 KG/M2 | HEART RATE: 100 BPM | HEIGHT: 69 IN | WEIGHT: 124.69 LBS | DIASTOLIC BLOOD PRESSURE: 56 MMHG

## 2017-07-26 DIAGNOSIS — N18.30 CKD (CHRONIC KIDNEY DISEASE) STAGE 3, GFR 30-59 ML/MIN: ICD-10-CM

## 2017-07-26 DIAGNOSIS — I34.0 MILD MITRAL INSUFFICIENCY: ICD-10-CM

## 2017-07-26 DIAGNOSIS — I48.91 ATRIAL FIBRILLATION WITH RVR: Chronic | ICD-10-CM

## 2017-07-26 DIAGNOSIS — I50.9 CONGESTIVE HEART FAILURE, UNSPECIFIED CONGESTIVE HEART FAILURE CHRONICITY, UNSPECIFIED CONGESTIVE HEART FAILURE TYPE: ICD-10-CM

## 2017-07-26 DIAGNOSIS — I50.22 CHRONIC SYSTOLIC CONGESTIVE HEART FAILURE: ICD-10-CM

## 2017-07-26 DIAGNOSIS — I87.2 VENOUS STASIS DERMATITIS OF BOTH LOWER EXTREMITIES: ICD-10-CM

## 2017-07-26 DIAGNOSIS — I35.1 MODERATE AORTIC INSUFFICIENCY: ICD-10-CM

## 2017-07-26 DIAGNOSIS — I48.20 CHRONIC ATRIAL FIBRILLATION: ICD-10-CM

## 2017-07-26 DIAGNOSIS — I50.22 CHRONIC SYSTOLIC CONGESTIVE HEART FAILURE: Primary | ICD-10-CM

## 2017-07-26 DIAGNOSIS — I42.9 CARDIOMYOPATHY, UNSPECIFIED TYPE: ICD-10-CM

## 2017-07-26 LAB
ANION GAP SERPL CALC-SCNC: 6 MMOL/L
BNP SERPL-MCNC: 1281 PG/ML
BUN SERPL-MCNC: 28 MG/DL
CALCIUM SERPL-MCNC: 9.5 MG/DL
CHLORIDE SERPL-SCNC: 108 MMOL/L
CO2 SERPL-SCNC: 29 MMOL/L
CREAT SERPL-MCNC: 2 MG/DL
EST. GFR  (AFRICAN AMERICAN): 36 ML/MIN/1.73 M^2
EST. GFR  (NON AFRICAN AMERICAN): 31 ML/MIN/1.73 M^2
GLUCOSE SERPL-MCNC: 95 MG/DL
POTASSIUM SERPL-SCNC: 5.2 MMOL/L
SODIUM SERPL-SCNC: 143 MMOL/L

## 2017-07-26 PROCEDURE — 99999 PR PBB SHADOW E&M-EST. PATIENT-LVL III: CPT | Mod: PBBFAC,,, | Performed by: NURSE PRACTITIONER

## 2017-07-26 PROCEDURE — 1126F AMNT PAIN NOTED NONE PRSNT: CPT | Mod: S$GLB,,, | Performed by: NURSE PRACTITIONER

## 2017-07-26 PROCEDURE — 1159F MED LIST DOCD IN RCRD: CPT | Mod: S$GLB,,, | Performed by: NURSE PRACTITIONER

## 2017-07-26 PROCEDURE — 99214 OFFICE O/P EST MOD 30 MIN: CPT | Mod: S$GLB,,, | Performed by: NURSE PRACTITIONER

## 2017-07-26 RX ORDER — LOSARTAN POTASSIUM 25 MG/1
25 TABLET ORAL DAILY
Qty: 90 TABLET | Refills: 3 | Status: SHIPPED | OUTPATIENT
Start: 2017-07-26 | End: 2018-03-02 | Stop reason: SDUPTHER

## 2017-07-26 RX ORDER — FUROSEMIDE 40 MG/1
40 TABLET ORAL DAILY PRN
Qty: 30 TABLET | Refills: 11
Start: 2017-07-26 | End: 2018-05-01 | Stop reason: SDUPTHER

## 2017-07-26 RX ORDER — METOPROLOL TARTRATE 50 MG/1
50 TABLET ORAL 2 TIMES DAILY
Qty: 270 TABLET | Refills: 3 | Status: SHIPPED | OUTPATIENT
Start: 2017-07-26 | End: 2018-04-04 | Stop reason: SDUPTHER

## 2017-07-26 NOTE — PROGRESS NOTES
Subjective:   Patient ID:  Yan Pickering Jr. is a 77 y.o. male who presents for follow up of Congestive Heart Failure and Atrial Fibrillation      HPI    Patient presents to clinic for follow up and management of A-fib CHF.  Has PMHx of atrial fibrillation, cardiomyopathy, systolic CHF 30% moderate MR, and chronic venous insufficiency.  He states that he has been doing well since last visit.  He has not had any symptoms to suggest TIA or CVA. No CHF symptoms. Takes Lasix PRN. Has only had to take lasix once since last visit. Does well with sodium intake. His BP is chronically low, but is asymptomatic. Patient states that he feels great.  Has no abnormal bleeding on Eliquis.       Past Medical History:   Diagnosis Date    Atrial fibrillation     Cardiomyopathy 4/26/2017    CKD (chronic kidney disease) stage 3, GFR 30-59 ml/min     Mild mitral insufficiency     Moderate aortic insufficiency     Venous stasis dermatitis of both lower extremities        No past surgical history on file.    Social History   Substance Use Topics    Smoking status: Never Smoker    Smokeless tobacco: Never Used    Alcohol use No       No family history on file.    Current Outpatient Prescriptions   Medication Sig    apixaban 2.5 mg Tab Take 1 tablet (2.5 mg total) by mouth 2 (two) times daily.    aspirin 81 MG Chew Take 1 tablet (81 mg total) by mouth once daily.    furosemide (LASIX) 40 MG tablet Take 1 tablet (40 mg total) by mouth daily as needed.    losartan (COZAAR) 25 MG tablet Take 1 tablet (25 mg total) by mouth once daily.    metoprolol tartrate (LOPRESSOR) 50 MG tablet Take 1 tablet (50 mg total) by mouth 2 (two) times daily. 50 (1 tabs) mg in the morning, 100 mg ( 2 tab) at night     No current facility-administered medications for this visit.      Current Outpatient Prescriptions on File Prior to Visit   Medication Sig    apixaban 2.5 mg Tab Take 1 tablet (2.5 mg total) by mouth 2 (two) times daily.    aspirin  81 MG Chew Take 1 tablet (81 mg total) by mouth once daily.    [DISCONTINUED] furosemide (LASIX) 40 MG tablet Take 1 tablet (40 mg total) by mouth once daily.    [DISCONTINUED] losartan (COZAAR) 25 MG tablet Take 1 tablet (25 mg total) by mouth once daily.    [DISCONTINUED] metoprolol tartrate (LOPRESSOR) 50 MG tablet Take 1 tablet (50 mg total) by mouth 2 (two) times daily. 50 (1 tabs) mg in the morning, 100 mg ( 2 tab) at night     No current facility-administered medications on file prior to visit.        Review of Systems   Constitution: Negative for decreased appetite, weakness, malaise/fatigue, weight gain and weight loss.   HENT: Negative for nosebleeds.    Cardiovascular: Negative for chest pain, claudication, dyspnea on exertion, irregular heartbeat, leg swelling, near-syncope, orthopnea, palpitations, paroxysmal nocturnal dyspnea and syncope.   Respiratory: Negative for cough, shortness of breath, sleep disturbances due to breathing, snoring and wheezing.    Hematologic/Lymphatic: Negative for bleeding problem. Does not bruise/bleed easily.   Skin: Negative for rash.   Musculoskeletal: Negative for arthritis, back pain, falls, joint pain, joint swelling, muscle cramps, muscle weakness and myalgias.   Gastrointestinal: Negative for bloating, abdominal pain, constipation, diarrhea, heartburn, nausea and vomiting.   Genitourinary: Negative for dysuria, hematuria and nocturia.   Neurological: Negative for excessive daytime sleepiness, dizziness, light-headedness, loss of balance, numbness, paresthesias and vertigo.       Objective:   Physical Exam   Constitutional: He is oriented to person, place, and time. He appears well-developed and well-nourished.   Neck: Neck supple. No JVD present.   Cardiovascular: Normal rate, normal heart sounds and normal pulses.  An irregularly irregular rhythm present. Exam reveals no friction rub.    No murmur heard.  Pulmonary/Chest: Effort normal and breath sounds normal. No  "respiratory distress. He has no wheezes. He has no rales.   Abdominal: Soft. Bowel sounds are normal. He exhibits no distension.   Musculoskeletal: He exhibits no edema or tenderness.   Neurological: He is alert and oriented to person, place, and time.   Skin: Skin is warm and dry. No rash noted.   Psychiatric: He has a normal mood and affect. His behavior is normal.   Nursing note and vitals reviewed.    Vitals:    07/26/17 0954 07/26/17 0956   BP: (!) 100/58 (!) 100/56   BP Location: Left arm Right arm   Patient Position: Sitting Sitting   BP Method: Manual Manual   Pulse: 100    Weight: 56.5 kg (124 lb 10.7 oz)    Height: 5' 9" (1.753 m)      No results found for: CHOL  No results found for: HDL  No results found for: LDLCALC  No results found for: TRIG  No results found for: CHOLHDL    Chemistry        Component Value Date/Time     05/22/2017 0953    K 5.1 05/22/2017 0953     05/22/2017 0953    CO2 26 05/22/2017 0953    BUN 50 (H) 05/22/2017 0953    CREATININE 2.3 (H) 05/22/2017 0953    GLU 96 05/22/2017 0953        Component Value Date/Time    CALCIUM 9.7 05/22/2017 0953    ALKPHOS 84 03/20/2017 0432    AST 22 03/20/2017 0432    ALT 26 03/20/2017 0432    BILITOT 0.8 03/20/2017 0432    ESTGFRAFRICA 30 (A) 05/22/2017 0953    EGFRNONAA 26 (A) 05/22/2017 0953          Lab Results   Component Value Date    TSH 0.973 04/15/2013     Lab Results   Component Value Date    INR 1.2 03/16/2017    INR 1.1 04/21/2015     Lab Results   Component Value Date    WBC 7.78 03/20/2017    HGB 13.1 (L) 03/20/2017    HCT 41.1 03/20/2017    MCV 93 03/20/2017     03/20/2017     BMP  Sodium   Date Value Ref Range Status   05/22/2017 141 136 - 145 mmol/L Final     Potassium   Date Value Ref Range Status   05/22/2017 5.1 3.5 - 5.1 mmol/L Final     Chloride   Date Value Ref Range Status   05/22/2017 106 95 - 110 mmol/L Final     CO2   Date Value Ref Range Status   05/22/2017 26 23 - 29 mmol/L Final     BUN, Bld   Date " Value Ref Range Status   05/22/2017 50 (H) 8 - 23 mg/dL Final     Creatinine   Date Value Ref Range Status   05/22/2017 2.3 (H) 0.5 - 1.4 mg/dL Final     Calcium   Date Value Ref Range Status   05/22/2017 9.7 8.7 - 10.5 mg/dL Final     Anion Gap   Date Value Ref Range Status   05/22/2017 9 8 - 16 mmol/L Final     eGFR if    Date Value Ref Range Status   05/22/2017 30 (A) >60 mL/min/1.73 m^2 Final     eGFR if non    Date Value Ref Range Status   05/22/2017 26 (A) >60 mL/min/1.73 m^2 Final     Comment:     Calculation used to obtain the estimated glomerular filtration  rate (eGFR) is the CKD-EPI equation. Since race is unknown   in our information system, the eGFR values for   -American and Non--American patients are given   for each creatinine result.       CrCl cannot be calculated (Patient's most recent sCr result is older than the maximum 7 days allowed.).    Assessment:     1. Chronic systolic congestive heart failure    2. Atrial fibrillation with RVR    3. Moderate aortic insufficiency    4. Mild mitral insufficiency    5. Chronic atrial fibrillation    6. Cardiomyopathy, unspecified type    7. CKD (chronic kidney disease) stage 3, GFR 30-59 ml/min    8. Venous stasis dermatitis of both lower extremities      Has no evidence of volume overload on exam  BP stable   No abnormal bleeding on Eliquis  No CNS complaints to suggest TIA or CVA   BMP and BNP pending   Using Lasix PRN    Plan:     Continue current medical management   Continue Eliquis for CVA prophylaxis   Continue to use Lasix PRN   Heart healthy diet  Phone review of labs   RTC in 3 months

## 2017-11-01 ENCOUNTER — OFFICE VISIT (OUTPATIENT)
Dept: CARDIOLOGY | Facility: CLINIC | Age: 77
End: 2017-11-01
Payer: MEDICARE

## 2017-11-01 VITALS
WEIGHT: 127 LBS | SYSTOLIC BLOOD PRESSURE: 120 MMHG | DIASTOLIC BLOOD PRESSURE: 64 MMHG | HEART RATE: 100 BPM | BODY MASS INDEX: 18.81 KG/M2 | HEIGHT: 69 IN

## 2017-11-01 DIAGNOSIS — I42.9 CARDIOMYOPATHY, UNSPECIFIED TYPE: ICD-10-CM

## 2017-11-01 DIAGNOSIS — N18.30 CKD (CHRONIC KIDNEY DISEASE) STAGE 3, GFR 30-59 ML/MIN: ICD-10-CM

## 2017-11-01 DIAGNOSIS — I48.20 CHRONIC ATRIAL FIBRILLATION: Primary | ICD-10-CM

## 2017-11-01 DIAGNOSIS — I34.0 MILD MITRAL INSUFFICIENCY: ICD-10-CM

## 2017-11-01 DIAGNOSIS — I87.2 VENOUS STASIS DERMATITIS OF BOTH LOWER EXTREMITIES: ICD-10-CM

## 2017-11-01 DIAGNOSIS — I35.1 MODERATE AORTIC INSUFFICIENCY: ICD-10-CM

## 2017-11-01 PROCEDURE — 99214 OFFICE O/P EST MOD 30 MIN: CPT | Mod: S$GLB,,, | Performed by: NURSE PRACTITIONER

## 2017-11-01 PROCEDURE — 99999 PR PBB SHADOW E&M-EST. PATIENT-LVL III: CPT | Mod: PBBFAC,,, | Performed by: NURSE PRACTITIONER

## 2017-11-01 NOTE — PROGRESS NOTES
Subjective:   Patient ID:  Yan Pickering Jr. is a 77 y.o. male who presents for follow up of Atrial Fibrillation and Congestive Heart Failure      HPI  Patient presents to clinic for follow up and management of A-fib and cardiomyopathy. Patient states that he has been doing well since last visit. He denies any chest pain, sob, orthopnea, PND, dizziness, near syncope or syncope. NO CNS complaints to suggest TIA or CVA. Has no abnormal bleeding on OAC. Does well with limiting his sodium intake. Using Lasix PRN.       Past Medical History:   Diagnosis Date    Atrial fibrillation     Cardiomyopathy 4/26/2017    CKD (chronic kidney disease) stage 3, GFR 30-59 ml/min     Mild mitral insufficiency     Moderate aortic insufficiency     Venous stasis dermatitis of both lower extremities        No past surgical history on file.    Social History   Substance Use Topics    Smoking status: Never Smoker    Smokeless tobacco: Never Used    Alcohol use No       No family history on file.    Current Outpatient Prescriptions   Medication Sig    apixaban 2.5 mg Tab Take 1 tablet (2.5 mg total) by mouth 2 (two) times daily.    aspirin 81 MG Chew Take 1 tablet (81 mg total) by mouth once daily.    furosemide (LASIX) 40 MG tablet Take 1 tablet (40 mg total) by mouth daily as needed.    losartan (COZAAR) 25 MG tablet Take 1 tablet (25 mg total) by mouth once daily.    metoprolol tartrate (LOPRESSOR) 50 MG tablet Take 1 tablet (50 mg total) by mouth 2 (two) times daily. 50 (1 tabs) mg in the morning, 100 mg ( 2 tab) at night     No current facility-administered medications for this visit.      Current Outpatient Prescriptions on File Prior to Visit   Medication Sig    apixaban 2.5 mg Tab Take 1 tablet (2.5 mg total) by mouth 2 (two) times daily.    aspirin 81 MG Chew Take 1 tablet (81 mg total) by mouth once daily.    furosemide (LASIX) 40 MG tablet Take 1 tablet (40 mg total) by mouth daily as needed.    losartan  (COZAAR) 25 MG tablet Take 1 tablet (25 mg total) by mouth once daily.    metoprolol tartrate (LOPRESSOR) 50 MG tablet Take 1 tablet (50 mg total) by mouth 2 (two) times daily. 50 (1 tabs) mg in the morning, 100 mg ( 2 tab) at night     No current facility-administered medications on file prior to visit.        Review of Systems   Constitution: Negative for decreased appetite, fever, weakness, malaise/fatigue, weight gain and weight loss.   HENT: Negative for congestion and nosebleeds.    Cardiovascular: Negative for chest pain, claudication, dyspnea on exertion, irregular heartbeat, leg swelling, near-syncope, orthopnea, palpitations, paroxysmal nocturnal dyspnea and syncope.   Respiratory: Negative for cough, shortness of breath, sleep disturbances due to breathing, snoring and wheezing.    Hematologic/Lymphatic: Negative for bleeding problem. Does not bruise/bleed easily.   Skin: Negative for rash.   Musculoskeletal: Negative for arthritis, back pain, falls, joint pain, joint swelling, muscle cramps and muscle weakness.   Gastrointestinal: Negative for bloating, abdominal pain, constipation, diarrhea, heartburn, nausea and vomiting.   Genitourinary: Negative for dysuria, frequency, hematuria and nocturia.   Neurological: Negative for excessive daytime sleepiness, dizziness, headaches, light-headedness, loss of balance, numbness and paresthesias.       Objective:   Physical Exam   Constitutional: He is oriented to person, place, and time. He appears well-developed and well-nourished.   Neck: Neck supple. No JVD present.   Cardiovascular: Normal rate, normal heart sounds and normal pulses.  An irregularly irregular rhythm present. Exam reveals no friction rub.    No murmur heard.  Pulmonary/Chest: Effort normal and breath sounds normal. No respiratory distress. He has no wheezes. He has no rales.   Abdominal: Soft. Bowel sounds are normal. He exhibits no distension.   Musculoskeletal: He exhibits no edema or  "tenderness.   Neurological: He is alert and oriented to person, place, and time.   Skin: Skin is warm and dry. No rash noted.   Psychiatric: He has a normal mood and affect. His behavior is normal.   Nursing note and vitals reviewed.    Vitals:    11/01/17 0914   BP: 120/64   BP Location: Right arm   Patient Position: Sitting   BP Method: Medium (Manual)   Pulse: 100   Weight: 57.6 kg (126 lb 15.8 oz)   Height: 5' 9" (1.753 m)     No results found for: CHOL  No results found for: HDL  No results found for: LDLCALC  No results found for: TRIG  No results found for: CHOLHDL    Chemistry        Component Value Date/Time     07/26/2017 0936    K 5.2 (H) 07/26/2017 0936     07/26/2017 0936    CO2 29 07/26/2017 0936    BUN 28 (H) 07/26/2017 0936    CREATININE 2.0 (H) 07/26/2017 0936    GLU 95 07/26/2017 0936        Component Value Date/Time    CALCIUM 9.5 07/26/2017 0936    ALKPHOS 84 03/20/2017 0432    AST 22 03/20/2017 0432    ALT 26 03/20/2017 0432    BILITOT 0.8 03/20/2017 0432    ESTGFRAFRICA 36 (A) 07/26/2017 0936    EGFRNONAA 31 (A) 07/26/2017 0936          Lab Results   Component Value Date    TSH 0.973 04/15/2013     Lab Results   Component Value Date    INR 1.2 03/16/2017    INR 1.1 04/21/2015     Lab Results   Component Value Date    WBC 7.78 03/20/2017    HGB 13.1 (L) 03/20/2017    HCT 41.1 03/20/2017    MCV 93 03/20/2017     03/20/2017     BMP  Sodium   Date Value Ref Range Status   07/26/2017 143 136 - 145 mmol/L Final     Potassium   Date Value Ref Range Status   07/26/2017 5.2 (H) 3.5 - 5.1 mmol/L Final     Chloride   Date Value Ref Range Status   07/26/2017 108 95 - 110 mmol/L Final     CO2   Date Value Ref Range Status   07/26/2017 29 23 - 29 mmol/L Final     BUN, Bld   Date Value Ref Range Status   07/26/2017 28 (H) 8 - 23 mg/dL Final     Creatinine   Date Value Ref Range Status   07/26/2017 2.0 (H) 0.5 - 1.4 mg/dL Final     Calcium   Date Value Ref Range Status   07/26/2017 9.5 8.7 - " 10.5 mg/dL Final     Anion Gap   Date Value Ref Range Status   07/26/2017 6 (L) 8 - 16 mmol/L Final     eGFR if    Date Value Ref Range Status   07/26/2017 36 (A) >60 mL/min/1.73 m^2 Final     eGFR if non    Date Value Ref Range Status   07/26/2017 31 (A) >60 mL/min/1.73 m^2 Final     Comment:     Calculation used to obtain the estimated glomerular filtration  rate (eGFR) is the CKD-EPI equation. Since race is unknown   in our information system, the eGFR values for   -American and Non--American patients are given   for each creatinine result.       CrCl cannot be calculated (Patient's most recent lab result is older than the maximum 7 days allowed.).    Assessment:     1. Chronic atrial fibrillation    2. Venous stasis dermatitis of both lower extremities    3. Moderate aortic insufficiency    4. Cardiomyopathy, unspecified type    5. CKD (chronic kidney disease) stage 3, GFR 30-59 ml/min    6. Mild mitral insufficiency      Has no evidence of volume overload on exam  BP stable   No abnormal bleeding on Eliquis  No CNS complaints to suggest TIA or CVA   BMP and BNP pending   Using Lasix PRN    Plan:     Continue current medical management   Continue Eliquis for CVA prophylaxis   Continue to use Lasix PRN   Heart healthy diet  Phone review of labs   RTC in 6 months

## 2017-12-16 ENCOUNTER — HOSPITAL ENCOUNTER (EMERGENCY)
Facility: HOSPITAL | Age: 77
Discharge: HOME | End: 2017-12-16
Attending: EMERGENCY MEDICINE
Payer: MEDICARE

## 2017-12-16 VITALS
HEART RATE: 97 BPM | TEMPERATURE: 98 F | WEIGHT: 122.56 LBS | DIASTOLIC BLOOD PRESSURE: 75 MMHG | RESPIRATION RATE: 20 BRPM | SYSTOLIC BLOOD PRESSURE: 133 MMHG | BODY MASS INDEX: 18.1 KG/M2 | OXYGEN SATURATION: 97 %

## 2017-12-16 DIAGNOSIS — I83.899 BLEEDING FROM VARICOSE VEIN: Primary | ICD-10-CM

## 2017-12-16 PROCEDURE — 99282 EMERGENCY DEPT VISIT SF MDM: CPT

## 2018-03-02 DIAGNOSIS — I50.22 CHRONIC SYSTOLIC CONGESTIVE HEART FAILURE: ICD-10-CM

## 2018-03-02 RX ORDER — LOSARTAN POTASSIUM 25 MG/1
25 TABLET ORAL DAILY
Qty: 30 TABLET | Refills: 11 | Status: SHIPPED | OUTPATIENT
Start: 2018-03-02 | End: 2018-04-03 | Stop reason: SDUPTHER

## 2018-04-03 DIAGNOSIS — I48.20 CHRONIC ATRIAL FIBRILLATION: ICD-10-CM

## 2018-04-03 DIAGNOSIS — I50.22 CHRONIC SYSTOLIC CONGESTIVE HEART FAILURE: ICD-10-CM

## 2018-04-04 RX ORDER — LOSARTAN POTASSIUM 25 MG/1
TABLET ORAL
Qty: 90 TABLET | Refills: 3 | Status: SHIPPED | OUTPATIENT
Start: 2018-04-04 | End: 2019-02-28 | Stop reason: SDUPTHER

## 2018-04-04 RX ORDER — APIXABAN 2.5 MG/1
TABLET, FILM COATED ORAL
Qty: 90 TABLET | Refills: 3 | Status: SHIPPED | OUTPATIENT
Start: 2018-04-04 | End: 2019-08-02 | Stop reason: SDUPTHER

## 2018-04-04 RX ORDER — METOPROLOL TARTRATE 50 MG/1
50 TABLET ORAL 2 TIMES DAILY
Qty: 270 TABLET | Refills: 3 | Status: SHIPPED | OUTPATIENT
Start: 2018-04-04 | End: 2018-08-13 | Stop reason: CLARIF

## 2018-04-04 NOTE — TELEPHONE ENCOUNTER
----- Message from Heike Celis sent at 4/4/2018  9:24 AM CDT -----  Contact: luis m-caretaker  called rg status of 3 rx refills sent yest, another sent today..[phone] (needs today)

## 2018-04-06 ENCOUNTER — TELEPHONE (OUTPATIENT)
Dept: CARDIOLOGY | Facility: CLINIC | Age: 78
End: 2018-04-06

## 2018-04-06 NOTE — TELEPHONE ENCOUNTER
----- Message from Filipe Bloom NP sent at 4/6/2018 12:56 PM CDT -----  Contact: pt wife Chikis Gilbert,     I received this message for Mr Ladan wife. She stated was returning a call. Looks like he saw Dl on 11/2017. Can you follow up with his wife.     Brittany Thurston    ----- Message -----  From: Teresita Delarosa  Sent: 4/3/2018  12:51 PM  To: Filipe Bloom NP    States she was returning a call and can be reached at [phone] Thanks

## 2018-05-01 ENCOUNTER — OFFICE VISIT (OUTPATIENT)
Dept: CARDIOLOGY | Facility: CLINIC | Age: 78
End: 2018-05-01
Payer: MEDICARE

## 2018-05-01 VITALS
SYSTOLIC BLOOD PRESSURE: 114 MMHG | HEIGHT: 69 IN | BODY MASS INDEX: 17.72 KG/M2 | HEART RATE: 95 BPM | WEIGHT: 119.63 LBS | DIASTOLIC BLOOD PRESSURE: 72 MMHG

## 2018-05-01 DIAGNOSIS — I42.9 CARDIOMYOPATHY, UNSPECIFIED TYPE: ICD-10-CM

## 2018-05-01 DIAGNOSIS — I35.1 MODERATE AORTIC INSUFFICIENCY: ICD-10-CM

## 2018-05-01 DIAGNOSIS — I48.91 ATRIAL FIBRILLATION: ICD-10-CM

## 2018-05-01 DIAGNOSIS — I34.0 MILD MITRAL INSUFFICIENCY: ICD-10-CM

## 2018-05-01 DIAGNOSIS — I50.9 CONGESTIVE HEART FAILURE, UNSPECIFIED HF CHRONICITY, UNSPECIFIED HEART FAILURE TYPE: ICD-10-CM

## 2018-05-01 DIAGNOSIS — N18.30 CKD (CHRONIC KIDNEY DISEASE) STAGE 3, GFR 30-59 ML/MIN: ICD-10-CM

## 2018-05-01 DIAGNOSIS — I48.20 CHRONIC ATRIAL FIBRILLATION: Primary | ICD-10-CM

## 2018-05-01 PROCEDURE — 93000 ELECTROCARDIOGRAM COMPLETE: CPT | Mod: S$GLB,,, | Performed by: INTERNAL MEDICINE

## 2018-05-01 PROCEDURE — 99999 PR PBB SHADOW E&M-EST. PATIENT-LVL III: CPT | Mod: PBBFAC,,, | Performed by: NURSE PRACTITIONER

## 2018-05-01 PROCEDURE — 99214 OFFICE O/P EST MOD 30 MIN: CPT | Mod: S$GLB,,, | Performed by: NURSE PRACTITIONER

## 2018-05-01 RX ORDER — FUROSEMIDE 40 MG/1
40 TABLET ORAL DAILY PRN
Qty: 30 TABLET | Refills: 11 | Status: SHIPPED | OUTPATIENT
Start: 2018-05-01 | End: 2019-08-30 | Stop reason: SDUPTHER

## 2018-05-01 NOTE — PROGRESS NOTES
Subjective:   Patient ID:  Yan Pickering Jr. is a 78 y.o. male who presents for follow up of Atrial Fibrillation      HPI  Patient presents to clinic for follow up and management of A-fib and cardiomyopathy. Patient states that he has been doing well since last visit. He denies any chest pain, sob, orthopnea, PND, dizziness, near syncope or syncope. No CNS complaints to suggest TIA or CVA. Has no abnormal bleeding on OAC. BP well controlled on current regimen. Using Lasix PRN. Has used Lasix three times since last visit after eating out.      Past Medical History:   Diagnosis Date    Atrial fibrillation     Cardiomyopathy 4/26/2017    CKD (chronic kidney disease) stage 3, GFR 30-59 ml/min     Mild mitral insufficiency     Moderate aortic insufficiency     Venous stasis dermatitis of both lower extremities        No past surgical history on file.    Social History   Substance Use Topics    Smoking status: Never Smoker    Smokeless tobacco: Never Used    Alcohol use No       No family history on file.    Current Outpatient Prescriptions   Medication Sig    ELIQUIS 2.5 mg Tab TAKE ONE TABLET BY MOUTH TWICE DAILY    furosemide (LASIX) 40 MG tablet Take 1 tablet (40 mg total) by mouth daily as needed.    losartan (COZAAR) 25 MG tablet TAKE ONE TABLET BY MOUTH ONCE DAILY    metoprolol tartrate (LOPRESSOR) 50 MG tablet Take 1 tablet (50 mg total) by mouth 2 (two) times daily. 50 (1 tabs) mg in the morning, 100 mg ( 2 tab) at night    aspirin 81 MG Chew Take 1 tablet (81 mg total) by mouth once daily.     No current facility-administered medications for this visit.      Current Outpatient Prescriptions on File Prior to Visit   Medication Sig    ELIQUIS 2.5 mg Tab TAKE ONE TABLET BY MOUTH TWICE DAILY    furosemide (LASIX) 40 MG tablet Take 1 tablet (40 mg total) by mouth daily as needed.    losartan (COZAAR) 25 MG tablet TAKE ONE TABLET BY MOUTH ONCE DAILY    metoprolol tartrate (LOPRESSOR) 50 MG tablet  Take 1 tablet (50 mg total) by mouth 2 (two) times daily. 50 (1 tabs) mg in the morning, 100 mg ( 2 tab) at night    aspirin 81 MG Chew Take 1 tablet (81 mg total) by mouth once daily.     No current facility-administered medications on file prior to visit.        Review of Systems   Constitution: Negative for diaphoresis, weakness, malaise/fatigue, weight gain and weight loss.   HENT: Negative for congestion and nosebleeds.    Cardiovascular: Negative for chest pain, claudication, cyanosis, dyspnea on exertion, irregular heartbeat, leg swelling, near-syncope, orthopnea, palpitations, paroxysmal nocturnal dyspnea and syncope.   Respiratory: Negative for cough, hemoptysis, shortness of breath, sleep disturbances due to breathing, snoring, sputum production and wheezing.    Hematologic/Lymphatic: Negative for bleeding problem. Does not bruise/bleed easily.   Skin: Negative for rash.   Musculoskeletal: Negative for arthritis, back pain, falls, joint pain, muscle cramps and muscle weakness.   Gastrointestinal: Negative for abdominal pain, constipation, diarrhea, heartburn, hematemesis, hematochezia, melena and nausea.   Genitourinary: Negative for dysuria, hematuria and nocturia.   Neurological: Negative for excessive daytime sleepiness, dizziness, headaches, light-headedness, loss of balance, numbness and vertigo.       Objective:   Physical Exam   Constitutional: He is oriented to person, place, and time. He appears well-developed and well-nourished.   Neck: Neck supple. No JVD present.   Cardiovascular: Normal rate, normal heart sounds and normal pulses.  An irregularly irregular rhythm present. Exam reveals no friction rub.    No murmur heard.  Pulmonary/Chest: Effort normal and breath sounds normal. No respiratory distress. He has no wheezes. He has no rales.   Abdominal: Soft. Bowel sounds are normal. He exhibits no distension.   Musculoskeletal: He exhibits no edema or tenderness.   Neurological: He is alert and  "oriented to person, place, and time.   Skin: Skin is warm and dry. No rash noted.   Psychiatric: He has a normal mood and affect. His behavior is normal.   Nursing note and vitals reviewed.    Vitals:    05/01/18 0852 05/01/18 0853   BP: 116/68 114/72   BP Location: Right arm Left arm   Pulse: 95    Weight: 54.3 kg (119 lb 9.6 oz)    Height: 5' 9" (1.753 m)      No results found for: CHOL  No results found for: HDL  No results found for: LDLCALC  No results found for: TRIG  No results found for: CHOLHDL    Chemistry        Component Value Date/Time     07/26/2017 0936    K 5.2 (H) 07/26/2017 0936     07/26/2017 0936    CO2 29 07/26/2017 0936    BUN 28 (H) 07/26/2017 0936    CREATININE 2.0 (H) 07/26/2017 0936    GLU 95 07/26/2017 0936        Component Value Date/Time    CALCIUM 9.5 07/26/2017 0936    ALKPHOS 84 03/20/2017 0432    AST 22 03/20/2017 0432    ALT 26 03/20/2017 0432    BILITOT 0.8 03/20/2017 0432    ESTGFRAFRICA 36 (A) 07/26/2017 0936    EGFRNONAA 31 (A) 07/26/2017 0936          Lab Results   Component Value Date    TSH 0.973 04/15/2013     Lab Results   Component Value Date    INR 1.2 03/16/2017    INR 1.1 04/21/2015     Lab Results   Component Value Date    WBC 7.78 03/20/2017    HGB 13.1 (L) 03/20/2017    HCT 41.1 03/20/2017    MCV 93 03/20/2017     03/20/2017     BMP  Sodium   Date Value Ref Range Status   07/26/2017 143 136 - 145 mmol/L Final     Potassium   Date Value Ref Range Status   07/26/2017 5.2 (H) 3.5 - 5.1 mmol/L Final     Chloride   Date Value Ref Range Status   07/26/2017 108 95 - 110 mmol/L Final     CO2   Date Value Ref Range Status   07/26/2017 29 23 - 29 mmol/L Final     BUN, Bld   Date Value Ref Range Status   07/26/2017 28 (H) 8 - 23 mg/dL Final     Creatinine   Date Value Ref Range Status   07/26/2017 2.0 (H) 0.5 - 1.4 mg/dL Final     Calcium   Date Value Ref Range Status   07/26/2017 9.5 8.7 - 10.5 mg/dL Final     Anion Gap   Date Value Ref Range Status "   07/26/2017 6 (L) 8 - 16 mmol/L Final     eGFR if    Date Value Ref Range Status   07/26/2017 36 (A) >60 mL/min/1.73 m^2 Final     eGFR if non    Date Value Ref Range Status   07/26/2017 31 (A) >60 mL/min/1.73 m^2 Final     Comment:     Calculation used to obtain the estimated glomerular filtration  rate (eGFR) is the CKD-EPI equation. Since race is unknown   in our information system, the eGFR values for   -American and Non--American patients are given   for each creatinine result.       CrCl cannot be calculated (Patient's most recent lab result is older than the maximum 7 days allowed.).    Assessment:     1. Chronic atrial fibrillation    2. Moderate aortic insufficiency    3. Mild mitral insufficiency    4. Congestive heart failure, unspecified HF chronicity, unspecified heart failure type    5. Cardiomyopathy, unspecified type    6. CKD (chronic kidney disease) stage 3, GFR 30-59 ml/min      Has no evidence of volume overload on exam  BP stable   No abnormal bleeding on Eliquis  No CNS complaints to suggest TIA or CVA   Using Lasix PRN  Plan:      Continue current medical management   Continue Eliquis for CVA prophylaxis   Continue to use Lasix PRN   Heart healthy diet  Phone review of labs   RTC in 1 year or sooner if needed

## 2018-08-13 DIAGNOSIS — I50.22 CHRONIC SYSTOLIC CONGESTIVE HEART FAILURE: ICD-10-CM

## 2018-08-13 DIAGNOSIS — I48.20 CHRONIC ATRIAL FIBRILLATION: ICD-10-CM

## 2018-08-13 RX ORDER — METOPROLOL TARTRATE 50 MG/1
TABLET ORAL
Qty: 90 TABLET | Refills: 11 | Status: SHIPPED | OUTPATIENT
Start: 2018-08-13 | End: 2019-08-02 | Stop reason: SDUPTHER

## 2019-02-28 ENCOUNTER — OFFICE VISIT (OUTPATIENT)
Dept: INTERNAL MEDICINE | Facility: CLINIC | Age: 79
End: 2019-02-28
Payer: MEDICARE

## 2019-02-28 VITALS
SYSTOLIC BLOOD PRESSURE: 112 MMHG | WEIGHT: 119.69 LBS | HEART RATE: 73 BPM | DIASTOLIC BLOOD PRESSURE: 62 MMHG | TEMPERATURE: 97 F | HEIGHT: 68 IN | OXYGEN SATURATION: 99 % | BODY MASS INDEX: 18.14 KG/M2 | RESPIRATION RATE: 20 BRPM

## 2019-02-28 DIAGNOSIS — Z79.899 ENCOUNTER FOR LONG-TERM (CURRENT) USE OF MEDICATIONS: ICD-10-CM

## 2019-02-28 DIAGNOSIS — Z00.00 ANNUAL PHYSICAL EXAM: Primary | ICD-10-CM

## 2019-02-28 DIAGNOSIS — I50.22 CHRONIC SYSTOLIC CONGESTIVE HEART FAILURE: ICD-10-CM

## 2019-02-28 DIAGNOSIS — I48.20 CHRONIC ATRIAL FIBRILLATION: ICD-10-CM

## 2019-02-28 PROCEDURE — 99999 PR PBB SHADOW E&M-EST. PATIENT-LVL III: CPT | Mod: PBBFAC,HCNC,, | Performed by: FAMILY MEDICINE

## 2019-02-28 PROCEDURE — 99387 INIT PM E/M NEW PAT 65+ YRS: CPT | Mod: HCNC,S$GLB,, | Performed by: FAMILY MEDICINE

## 2019-02-28 PROCEDURE — 99387 PR PREVENTIVE VISIT,NEW,65 & OVER: ICD-10-PCS | Mod: HCNC,S$GLB,, | Performed by: FAMILY MEDICINE

## 2019-02-28 PROCEDURE — 99999 PR PBB SHADOW E&M-EST. PATIENT-LVL III: ICD-10-PCS | Mod: PBBFAC,HCNC,, | Performed by: FAMILY MEDICINE

## 2019-02-28 RX ORDER — LOSARTAN POTASSIUM 25 MG/1
25 TABLET ORAL DAILY
Qty: 90 TABLET | Refills: 3 | Status: SHIPPED | OUTPATIENT
Start: 2019-02-28 | End: 2019-08-02 | Stop reason: SDUPTHER

## 2019-02-28 NOTE — PROGRESS NOTES
Subjective:       Patient ID: Yan Pickering Jr. is a 78 y.o. male.    Chief Complaint: Establish Care and Annual Exam    HPI     79 yo M    A. 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  Takes a lasix if he gains weight    Takes ASA  Takes Eliquis - betters quite easily        Review of Systems   Constitutional: Negative for activity change and unexpected weight change.   HENT: Negative for hearing loss, rhinorrhea and trouble swallowing.    Eyes: Negative for discharge and visual disturbance.   Respiratory: Negative for chest tightness and wheezing.    Cardiovascular: Negative for chest pain and palpitations.   Gastrointestinal: Negative for blood in stool, constipation, diarrhea and vomiting.   Endocrine: Negative for polydipsia and polyuria.   Genitourinary: Negative for difficulty urinating, hematuria and urgency.   Musculoskeletal: Negative for arthralgias, joint swelling and neck pain.   Neurological: Negative for weakness and headaches.   Psychiatric/Behavioral: Negative for confusion and dysphoric mood.       Objective:       Vitals:    02/28/19 1322   BP: 112/62   Pulse: 73   Resp: 20   Temp: 97 °F (36.1 °C)       Physical Exam   Constitutional: He is oriented to person, place, and time. He appears well-developed and well-nourished. No distress.   HENT:   Head: Normocephalic and atraumatic.   Right Ear: Hearing, tympanic membrane, external ear and ear canal normal.   Left Ear: Hearing, tympanic membrane, external ear and ear canal normal.   Nose: Nose normal. Right sinus exhibits no maxillary sinus tenderness and no frontal sinus tenderness. Left sinus exhibits no maxillary sinus tenderness and no frontal sinus tenderness.   Mouth/Throat: Uvula is midline, oropharynx is  clear and moist and mucous membranes are normal.   Eyes: Conjunctivae are normal. Right eye exhibits no discharge. Left eye exhibits no discharge.   Neck: Trachea normal, normal range of motion and full passive range of motion without pain.   Cardiovascular: Normal rate, regular rhythm, normal heart sounds and intact distal pulses.   Pulmonary/Chest: Effort normal and breath sounds normal. No respiratory distress. He has no decreased breath sounds. He has no wheezes.   Abdominal: Soft. Normal appearance and bowel sounds are normal. He exhibits no distension and no mass. There is no tenderness. There is no guarding. No hernia.   Musculoskeletal: Normal range of motion. He exhibits no edema or deformity.   Severe kyphosis   Lymphadenopathy:     He has no cervical adenopathy.   Neurological: He is alert and oriented to person, place, and time.   Skin: Skin is warm, dry and intact. No rash noted. No erythema. No pallor.   Healed ulcers on lower extremity  Dark /red - lower extremities   Non tender  Not warm   Psychiatric: He has a normal mood and affect. His speech is normal and behavior is normal. Thought content normal.       Assessment:       1. Annual physical exam    2. Encounter for long-term (current) use of medications        Plan:   Annual physical exam  -     CBC auto differential; Future; Expected date: 02/28/2019  -     Comprehensive metabolic panel; Future; Expected date: 02/28/2019  -     Lipid panel; Future; Expected date: 02/28/2019  -     Vitamin D; Future; Expected date: 02/28/2019    Encounter for long-term (current) use of medications  -     CBC auto differential; Future; Expected date: 02/28/2019  -     Comprehensive metabolic panel; Future; Expected date: 02/28/2019  -     Lipid panel; Future; Expected date: 02/28/2019  -     Vitamin D; Future; Expected date: 02/28/2019    Other orders  -     Cancel: (In Office Administered) Pneumococcal Conjugate Vaccine (13 Valent) (IM)        Patient seems  "hesitant to do any blood work  He is hesitant to discuss things with me  Wants to "think" things over  Pleasant - but I was left somewhat confused about what he was discussing.    Will try and obtain compression bandages    No Follow-up on file.  "

## 2019-04-01 RX ORDER — APIXABAN 2.5 MG/1
TABLET, FILM COATED ORAL
Qty: 60 TABLET | Refills: 11 | Status: SHIPPED | OUTPATIENT
Start: 2019-04-01 | End: 2019-08-02

## 2019-08-02 ENCOUNTER — OFFICE VISIT (OUTPATIENT)
Dept: CARDIOLOGY | Facility: CLINIC | Age: 79
End: 2019-08-02
Payer: MEDICARE

## 2019-08-02 ENCOUNTER — CLINICAL SUPPORT (OUTPATIENT)
Dept: CARDIOLOGY | Facility: CLINIC | Age: 79
End: 2019-08-02
Payer: MEDICARE

## 2019-08-02 VITALS
HEART RATE: 98 BPM | SYSTOLIC BLOOD PRESSURE: 120 MMHG | BODY MASS INDEX: 18.11 KG/M2 | HEIGHT: 68 IN | DIASTOLIC BLOOD PRESSURE: 68 MMHG | WEIGHT: 119.5 LBS

## 2019-08-02 DIAGNOSIS — I48.20 CHRONIC ATRIAL FIBRILLATION: Primary | ICD-10-CM

## 2019-08-02 DIAGNOSIS — I50.22 CHRONIC SYSTOLIC CONGESTIVE HEART FAILURE: ICD-10-CM

## 2019-08-02 DIAGNOSIS — I48.20 CHRONIC ATRIAL FIBRILLATION: ICD-10-CM

## 2019-08-02 PROCEDURE — 93000 ELECTROCARDIOGRAM COMPLETE: CPT | Mod: HCNC,S$GLB,, | Performed by: INTERNAL MEDICINE

## 2019-08-02 PROCEDURE — 99999 PR PBB SHADOW E&M-EST. PATIENT-LVL III: ICD-10-PCS | Mod: PBBFAC,HCNC,, | Performed by: NURSE PRACTITIONER

## 2019-08-02 PROCEDURE — 1101F PT FALLS ASSESS-DOCD LE1/YR: CPT | Mod: HCNC,CPTII,S$GLB, | Performed by: NURSE PRACTITIONER

## 2019-08-02 PROCEDURE — 99214 PR OFFICE/OUTPT VISIT, EST, LEVL IV, 30-39 MIN: ICD-10-PCS | Mod: HCNC,S$GLB,, | Performed by: NURSE PRACTITIONER

## 2019-08-02 PROCEDURE — 99214 OFFICE O/P EST MOD 30 MIN: CPT | Mod: HCNC,S$GLB,, | Performed by: NURSE PRACTITIONER

## 2019-08-02 PROCEDURE — 93000 EKG 12-LEAD: ICD-10-PCS | Mod: HCNC,S$GLB,, | Performed by: INTERNAL MEDICINE

## 2019-08-02 PROCEDURE — 99999 PR PBB SHADOW E&M-EST. PATIENT-LVL III: CPT | Mod: PBBFAC,HCNC,, | Performed by: NURSE PRACTITIONER

## 2019-08-02 PROCEDURE — 1101F PR PT FALLS ASSESS DOC 0-1 FALLS W/OUT INJ PAST YR: ICD-10-PCS | Mod: HCNC,CPTII,S$GLB, | Performed by: NURSE PRACTITIONER

## 2019-08-02 RX ORDER — METOPROLOL TARTRATE 50 MG/1
TABLET ORAL
Qty: 90 TABLET | Refills: 11 | Status: ON HOLD | OUTPATIENT
Start: 2019-08-02 | End: 2020-01-24 | Stop reason: HOSPADM

## 2019-08-02 RX ORDER — LOSARTAN POTASSIUM 25 MG/1
25 TABLET ORAL DAILY
Qty: 30 TABLET | Refills: 11 | Status: SHIPPED | OUTPATIENT
Start: 2019-08-02 | End: 2020-08-18 | Stop reason: SDUPTHER

## 2019-08-02 RX ORDER — METOPROLOL TARTRATE 50 MG/1
TABLET ORAL
Qty: 90 TABLET | Refills: 11 | OUTPATIENT
Start: 2019-08-02

## 2019-08-02 NOTE — PROGRESS NOTES
Subjective:   Patient ID:  Yan Pickering Jr. is a 79 y.o. male who presents for follow up of Atrial Fibrillation and Congestive Heart Failure      HPI  Mr. Pickering's current medical conditions include chronic A-fib, HTN,  cardiomyopathy with LVEF 30%, CKD, moderate aortic insufficiency. Is anticoagulated with Eliquis.   EKG today shows chronic A-fib with controlled rate.   Denies any chest pain, SOB, LUNA,  orthopnea, PND, dizziness, palpitations,  near syncope, syncope or edema . Has no symptoms concerning for angina or equivalent. No CNS Complaints to suggest TIA or CVA. Does well with limiting sodium intake.  Has no abnormal bleeding on Eliquis.   Still uses Lasix only PRN.   Past Medical History:   Diagnosis Date    Atrial fibrillation     Cardiomyopathy 4/26/2017    CHF (congestive heart failure)     CKD (chronic kidney disease) stage 3, GFR 30-59 ml/min     Mild mitral insufficiency     Moderate aortic insufficiency     Venous stasis dermatitis of both lower extremities        History reviewed. No pertinent surgical history.    Social History     Tobacco Use    Smoking status: Never Smoker    Smokeless tobacco: Never Used   Substance Use Topics    Alcohol use: No    Drug use: No       History reviewed. No pertinent family history.    Current Outpatient Medications   Medication Sig    aspirin 81 MG Chew Take 1 tablet (81 mg total) by mouth once daily.    ELIQUIS 2.5 mg Tab TAKE ONE TABLET BY MOUTH TWICE DAILY    furosemide (LASIX) 40 MG tablet Take 1 tablet (40 mg total) by mouth daily as needed.    losartan (COZAAR) 25 MG tablet Take 1 tablet (25 mg total) by mouth once daily.    metoprolol tartrate (LOPRESSOR) 50 MG tablet Take 1 tab in the morning and 2 tabs in the evening     No current facility-administered medications for this visit.      Current Outpatient Medications on File Prior to Visit   Medication Sig    aspirin 81 MG Chew Take 1 tablet (81 mg total) by mouth once daily.    ELIQUIS  2.5 mg Tab TAKE ONE TABLET BY MOUTH TWICE DAILY    furosemide (LASIX) 40 MG tablet Take 1 tablet (40 mg total) by mouth daily as needed.    losartan (COZAAR) 25 MG tablet Take 1 tablet (25 mg total) by mouth once daily.    metoprolol tartrate (LOPRESSOR) 50 MG tablet Take 1 tab in the morning and 2 tabs in the evening    [DISCONTINUED] ELIQUIS 2.5 mg Tab TAKE 1 TABLET BY MOUTH TWICE DAILY     No current facility-administered medications on file prior to visit.        Review of Systems   Constitution: Negative for diaphoresis, malaise/fatigue, weight gain and weight loss.   HENT: Negative for congestion and nosebleeds.    Cardiovascular: Negative for chest pain, claudication, cyanosis, dyspnea on exertion, irregular heartbeat, leg swelling, near-syncope, orthopnea, palpitations, paroxysmal nocturnal dyspnea and syncope.   Respiratory: Negative for cough, hemoptysis, shortness of breath, sleep disturbances due to breathing, snoring, sputum production and wheezing.    Hematologic/Lymphatic: Negative for bleeding problem. Does not bruise/bleed easily.   Skin: Negative for rash.   Musculoskeletal: Negative for arthritis, back pain, falls, joint pain, muscle cramps and muscle weakness.   Gastrointestinal: Negative for abdominal pain, constipation, diarrhea, heartburn, hematemesis, hematochezia, melena, nausea and vomiting.   Genitourinary: Negative for dysuria, hematuria and nocturia.   Neurological: Negative for excessive daytime sleepiness, dizziness, headaches, light-headedness, loss of balance, numbness, vertigo and weakness.       Objective:   Physical Exam   Constitutional: He is oriented to person, place, and time. He appears well-developed and well-nourished.   Neck: Neck supple. No JVD present.   Cardiovascular: Normal rate, normal heart sounds and normal pulses. An irregularly irregular rhythm present. Exam reveals no friction rub.   No murmur heard.  Pulmonary/Chest: Effort normal and breath sounds normal.  "No respiratory distress. He has no wheezes. He has no rales.   Abdominal: Soft. Bowel sounds are normal. He exhibits no distension.   Musculoskeletal: He exhibits no edema or tenderness.   Neurological: He is alert and oriented to person, place, and time.   Skin: Skin is warm and dry. No rash noted.   Spider veins to BLE      Psychiatric: He has a normal mood and affect. His behavior is normal.   Nursing note and vitals reviewed.    Vitals:    08/02/19 0819   BP: 120/68   BP Location: Right arm   Pulse: 110   Weight: 54.2 kg (119 lb 7.8 oz)   Height: 5' 8" (1.727 m)     No results found for: CHOL  No results found for: HDL  No results found for: LDLCALC  No results found for: TRIG  No results found for: CHOLHDL    Chemistry        Component Value Date/Time     07/26/2017 0936    K 5.2 (H) 07/26/2017 0936     07/26/2017 0936    CO2 29 07/26/2017 0936    BUN 28 (H) 07/26/2017 0936    CREATININE 2.0 (H) 07/26/2017 0936    GLU 95 07/26/2017 0936        Component Value Date/Time    CALCIUM 9.5 07/26/2017 0936    ALKPHOS 84 03/20/2017 0432    AST 22 03/20/2017 0432    ALT 26 03/20/2017 0432    BILITOT 0.8 03/20/2017 0432    ESTGFRAFRICA 36 (A) 07/26/2017 0936    EGFRNONAA 31 (A) 07/26/2017 0936          Lab Results   Component Value Date    TSH 0.973 04/15/2013     Lab Results   Component Value Date    INR 1.2 03/16/2017    INR 1.1 04/21/2015     Lab Results   Component Value Date    WBC 7.78 03/20/2017    HGB 13.1 (L) 03/20/2017    HCT 41.1 03/20/2017    MCV 93 03/20/2017     03/20/2017     BMP  Sodium   Date Value Ref Range Status   07/26/2017 143 136 - 145 mmol/L Final     Potassium   Date Value Ref Range Status   07/26/2017 5.2 (H) 3.5 - 5.1 mmol/L Final     Chloride   Date Value Ref Range Status   07/26/2017 108 95 - 110 mmol/L Final     CO2   Date Value Ref Range Status   07/26/2017 29 23 - 29 mmol/L Final     BUN, Bld   Date Value Ref Range Status   07/26/2017 28 (H) 8 - 23 mg/dL Final "     Creatinine   Date Value Ref Range Status   07/26/2017 2.0 (H) 0.5 - 1.4 mg/dL Final     Calcium   Date Value Ref Range Status   07/26/2017 9.5 8.7 - 10.5 mg/dL Final     Anion Gap   Date Value Ref Range Status   07/26/2017 6 (L) 8 - 16 mmol/L Final     eGFR if    Date Value Ref Range Status   07/26/2017 36 (A) >60 mL/min/1.73 m^2 Final     eGFR if non    Date Value Ref Range Status   07/26/2017 31 (A) >60 mL/min/1.73 m^2 Final     Comment:     Calculation used to obtain the estimated glomerular filtration  rate (eGFR) is the CKD-EPI equation. Since race is unknown   in our information system, the eGFR values for   -American and Non--American patients are given   for each creatinine result.       CrCl cannot be calculated (Patient's most recent lab result is older than the maximum 7 days allowed.).    Assessment:     1. Chronic atrial fibrillation        Plan:   Continue metoprolol for rate control   continue Eliqius for CVA prophylaxis  Heart healthy diet   RTC in 1 yr

## 2019-08-30 RX ORDER — FUROSEMIDE 40 MG/1
TABLET ORAL
Qty: 15 TABLET | Refills: 6 | Status: SHIPPED | OUTPATIENT
Start: 2019-08-30 | End: 2019-09-06 | Stop reason: SDUPTHER

## 2019-09-06 RX ORDER — FUROSEMIDE 40 MG/1
40 TABLET ORAL DAILY
Qty: 90 TABLET | Refills: 3 | Status: SHIPPED | OUTPATIENT
Start: 2019-09-06 | End: 2020-02-10 | Stop reason: SDUPTHER

## 2020-01-23 ENCOUNTER — HOSPITAL ENCOUNTER (OUTPATIENT)
Facility: HOSPITAL | Age: 80
Discharge: HOME | End: 2020-01-24
Attending: EMERGENCY MEDICINE | Admitting: INTERNAL MEDICINE
Payer: MEDICARE

## 2020-01-23 DIAGNOSIS — S81.802A LEG WOUND, LEFT, INITIAL ENCOUNTER: ICD-10-CM

## 2020-01-23 DIAGNOSIS — R00.2 PALPITATIONS: ICD-10-CM

## 2020-01-23 DIAGNOSIS — I48.91 ATRIAL FIBRILLATION WITH RVR: ICD-10-CM

## 2020-01-23 DIAGNOSIS — I48.91 ATRIAL FIBRILLATION WITH RAPID VENTRICULAR RESPONSE: ICD-10-CM

## 2020-01-23 DIAGNOSIS — D64.9 ANEMIA, UNSPECIFIED TYPE: Primary | ICD-10-CM

## 2020-01-23 DIAGNOSIS — I48.91 ATRIAL FIBRILLATION: ICD-10-CM

## 2020-01-23 PROBLEM — I50.22 CHRONIC SYSTOLIC CONGESTIVE HEART FAILURE: Status: ACTIVE | Noted: 2017-03-16

## 2020-01-23 LAB
ABO + RH BLD: NORMAL
ALBUMIN SERPL BCP-MCNC: 3.5 G/DL (ref 3.5–5.2)
ALP SERPL-CCNC: 66 U/L (ref 55–135)
ALT SERPL W/O P-5'-P-CCNC: 6 U/L (ref 10–44)
ANION GAP SERPL CALC-SCNC: 11 MMOL/L (ref 8–16)
AST SERPL-CCNC: 11 U/L (ref 10–40)
BASOPHILS # BLD AUTO: 0.07 K/UL (ref 0–0.2)
BASOPHILS NFR BLD: 1 % (ref 0–1.9)
BILIRUB SERPL-MCNC: 0.7 MG/DL (ref 0.1–1)
BLD GP AB SCN CELLS X3 SERPL QL: NORMAL
BLD PROD TYP BPU: NORMAL
BLOOD UNIT EXPIRATION DATE: NORMAL
BLOOD UNIT TYPE CODE: 5100
BLOOD UNIT TYPE: NORMAL
BUN SERPL-MCNC: 42 MG/DL (ref 8–23)
CALCIUM SERPL-MCNC: 9.1 MG/DL (ref 8.7–10.5)
CHLORIDE SERPL-SCNC: 106 MMOL/L (ref 95–110)
CK SERPL-CCNC: 111 U/L (ref 20–200)
CO2 SERPL-SCNC: 22 MMOL/L (ref 23–29)
CODING SYSTEM: NORMAL
CREAT SERPL-MCNC: 2.9 MG/DL (ref 0.5–1.4)
DIFFERENTIAL METHOD: ABNORMAL
DISPENSE STATUS: NORMAL
EOSINOPHIL # BLD AUTO: 0 K/UL (ref 0–0.5)
EOSINOPHIL NFR BLD: 0.3 % (ref 0–8)
ERYTHROCYTE [DISTWIDTH] IN BLOOD BY AUTOMATED COUNT: 14.7 % (ref 11.5–14.5)
EST. GFR  (AFRICAN AMERICAN): 22 ML/MIN/1.73 M^2
EST. GFR  (NON AFRICAN AMERICAN): 19 ML/MIN/1.73 M^2
GLUCOSE SERPL-MCNC: 106 MG/DL (ref 70–110)
HCT VFR BLD AUTO: 25 % (ref 40–54)
HGB BLD-MCNC: 7.4 G/DL (ref 14–18)
IMM GRANULOCYTES # BLD AUTO: 0.03 K/UL (ref 0–0.04)
IMM GRANULOCYTES NFR BLD AUTO: 0.4 % (ref 0–0.5)
INR PPP: 1.1 (ref 0.8–1.2)
LACTATE SERPL-SCNC: 1.2 MMOL/L (ref 0.5–2.2)
LYMPHOCYTES # BLD AUTO: 0.8 K/UL (ref 1–4.8)
LYMPHOCYTES NFR BLD: 11 % (ref 18–48)
MCH RBC QN AUTO: 29.2 PG (ref 27–31)
MCHC RBC AUTO-ENTMCNC: 29.6 G/DL (ref 32–36)
MCV RBC AUTO: 99 FL (ref 82–98)
MONOCYTES # BLD AUTO: 0.7 K/UL (ref 0.3–1)
MONOCYTES NFR BLD: 9.9 % (ref 4–15)
NEUTROPHILS # BLD AUTO: 5.5 K/UL (ref 1.8–7.7)
NEUTROPHILS NFR BLD: 77.4 % (ref 38–73)
NRBC BLD-RTO: 0 /100 WBC
NUM UNITS TRANS PACKED RBC: NORMAL
PLATELET # BLD AUTO: 194 K/UL (ref 150–350)
PMV BLD AUTO: 11 FL (ref 9.2–12.9)
POTASSIUM SERPL-SCNC: 5.2 MMOL/L (ref 3.5–5.1)
PROT SERPL-MCNC: 7.3 G/DL (ref 6–8.4)
PROTHROMBIN TIME: 11.9 SEC (ref 9–12.5)
RBC # BLD AUTO: 2.53 M/UL (ref 4.6–6.2)
SODIUM SERPL-SCNC: 139 MMOL/L (ref 136–145)
TROPONIN I SERPL DL<=0.01 NG/ML-MCNC: 0.01 NG/ML (ref 0–0.03)
TROPONIN I SERPL DL<=0.01 NG/ML-MCNC: 0.01 NG/ML (ref 0–0.03)
WBC # BLD AUTO: 7.09 K/UL (ref 3.9–12.7)

## 2020-01-23 PROCEDURE — 86920 COMPATIBILITY TEST SPIN: CPT | Mod: HCNC

## 2020-01-23 PROCEDURE — 36415 COLL VENOUS BLD VENIPUNCTURE: CPT | Mod: HCNC

## 2020-01-23 PROCEDURE — 25000003 PHARM REV CODE 250: Mod: HCNC | Performed by: EMERGENCY MEDICINE

## 2020-01-23 PROCEDURE — 83605 ASSAY OF LACTIC ACID: CPT | Mod: HCNC

## 2020-01-23 PROCEDURE — 87040 BLOOD CULTURE FOR BACTERIA: CPT | Mod: HCNC

## 2020-01-23 PROCEDURE — 93005 ELECTROCARDIOGRAM TRACING: CPT | Mod: HCNC

## 2020-01-23 PROCEDURE — 84484 ASSAY OF TROPONIN QUANT: CPT | Mod: HCNC

## 2020-01-23 PROCEDURE — 80053 COMPREHEN METABOLIC PANEL: CPT | Mod: HCNC

## 2020-01-23 PROCEDURE — 84484 ASSAY OF TROPONIN QUANT: CPT | Mod: 91,HCNC

## 2020-01-23 PROCEDURE — G0378 HOSPITAL OBSERVATION PER HR: HCPCS | Mod: HCNC

## 2020-01-23 PROCEDURE — 82550 ASSAY OF CK (CPK): CPT | Mod: HCNC

## 2020-01-23 PROCEDURE — 93010 EKG 12-LEAD: ICD-10-PCS | Mod: HCNC,,, | Performed by: INTERNAL MEDICINE

## 2020-01-23 PROCEDURE — 86901 BLOOD TYPING SEROLOGIC RH(D): CPT | Mod: HCNC

## 2020-01-23 PROCEDURE — 93010 ELECTROCARDIOGRAM REPORT: CPT | Mod: HCNC,,, | Performed by: INTERNAL MEDICINE

## 2020-01-23 PROCEDURE — 96374 THER/PROPH/DIAG INJ IV PUSH: CPT | Mod: HCNC

## 2020-01-23 PROCEDURE — 63600175 PHARM REV CODE 636 W HCPCS: Mod: HCNC | Performed by: EMERGENCY MEDICINE

## 2020-01-23 PROCEDURE — 99291 CRITICAL CARE FIRST HOUR: CPT | Mod: 25,HCNC

## 2020-01-23 PROCEDURE — 85025 COMPLETE CBC W/AUTO DIFF WBC: CPT | Mod: HCNC

## 2020-01-23 PROCEDURE — 96375 TX/PRO/DX INJ NEW DRUG ADDON: CPT

## 2020-01-23 PROCEDURE — 25000003 PHARM REV CODE 250: Mod: HCNC | Performed by: INTERNAL MEDICINE

## 2020-01-23 PROCEDURE — 85610 PROTHROMBIN TIME: CPT | Mod: HCNC

## 2020-01-23 PROCEDURE — P9016 RBC LEUKOCYTES REDUCED: HCPCS | Mod: HCNC

## 2020-01-23 RX ORDER — DOXYCYCLINE HYCLATE 100 MG
100 TABLET ORAL EVERY 12 HOURS
Status: DISCONTINUED | OUTPATIENT
Start: 2020-01-23 | End: 2020-01-24 | Stop reason: HOSPADM

## 2020-01-23 RX ORDER — IPRATROPIUM BROMIDE AND ALBUTEROL SULFATE 2.5; .5 MG/3ML; MG/3ML
3 SOLUTION RESPIRATORY (INHALATION) EVERY 4 HOURS PRN
Status: DISCONTINUED | OUTPATIENT
Start: 2020-01-23 | End: 2020-01-24 | Stop reason: HOSPADM

## 2020-01-23 RX ORDER — ONDANSETRON 2 MG/ML
4 INJECTION INTRAMUSCULAR; INTRAVENOUS EVERY 8 HOURS PRN
Status: DISCONTINUED | OUTPATIENT
Start: 2020-01-23 | End: 2020-01-24 | Stop reason: HOSPADM

## 2020-01-23 RX ORDER — GUAIFENESIN 100 MG/5ML
200 SOLUTION ORAL EVERY 4 HOURS PRN
Status: DISCONTINUED | OUTPATIENT
Start: 2020-01-23 | End: 2020-01-24 | Stop reason: HOSPADM

## 2020-01-23 RX ORDER — METOPROLOL TARTRATE 50 MG/1
50 TABLET ORAL 2 TIMES DAILY
Status: DISCONTINUED | OUTPATIENT
Start: 2020-01-23 | End: 2020-01-24

## 2020-01-23 RX ORDER — MAG HYDROX/ALUMINUM HYD/SIMETH 200-200-20
30 SUSPENSION, ORAL (FINAL DOSE FORM) ORAL EVERY 6 HOURS PRN
Status: DISCONTINUED | OUTPATIENT
Start: 2020-01-23 | End: 2020-01-24 | Stop reason: HOSPADM

## 2020-01-23 RX ORDER — ACETAMINOPHEN 325 MG/1
650 TABLET ORAL EVERY 6 HOURS PRN
Status: DISCONTINUED | OUTPATIENT
Start: 2020-01-23 | End: 2020-01-24 | Stop reason: HOSPADM

## 2020-01-23 RX ORDER — DIPHENHYDRAMINE HCL 25 MG
25 CAPSULE ORAL EVERY 6 HOURS PRN
Status: DISCONTINUED | OUTPATIENT
Start: 2020-01-23 | End: 2020-01-24 | Stop reason: HOSPADM

## 2020-01-23 RX ORDER — HYDROCODONE BITARTRATE AND ACETAMINOPHEN 500; 5 MG/1; MG/1
TABLET ORAL
Status: DISCONTINUED | OUTPATIENT
Start: 2020-01-23 | End: 2020-01-24 | Stop reason: HOSPADM

## 2020-01-23 RX ORDER — VANCOMYCIN HCL IN 5 % DEXTROSE 1G/250ML
1000 PLASTIC BAG, INJECTION (ML) INTRAVENOUS
Status: COMPLETED | OUTPATIENT
Start: 2020-01-23 | End: 2020-01-23

## 2020-01-23 RX ORDER — METOPROLOL TARTRATE 1 MG/ML
5 INJECTION, SOLUTION INTRAVENOUS
Status: COMPLETED | OUTPATIENT
Start: 2020-01-23 | End: 2020-01-23

## 2020-01-23 RX ADMIN — VANCOMYCIN HYDROCHLORIDE 1000 MG: 1 INJECTION, POWDER, LYOPHILIZED, FOR SOLUTION INTRAVENOUS at 06:01

## 2020-01-23 RX ADMIN — DOXYCYCLINE HYCLATE 100 MG: 100 TABLET, COATED ORAL at 10:01

## 2020-01-23 RX ADMIN — METOPROLOL TARTRATE 5 MG: 1 INJECTION, SOLUTION INTRAVENOUS at 07:01

## 2020-01-23 RX ADMIN — METOPROLOL TARTRATE 50 MG: 50 TABLET ORAL at 10:01

## 2020-01-23 NOTE — ED PROVIDER NOTES
"Per Bertrand Francois, NP: 79 year old male with complaint of ulceration to left lower leg X several days.  Also reports elevated HR.  Pt has history of a-fib.  HR in 130-140's in triage.    SCRIBE #1 NOTE: I, Edinson Morse, am scribing for, and in the presence of, Samson Hernandez MD. I have scribed the HPI, ROS, and PEx.     SCRIBE #2 NOTE: I, Tamera Miner, am scribing for, and in the presence of,  Almaz Amaya MD. I have scribed the remaining portions of the note not scribed by Scribe #1.     History      Chief Complaint   Patient presents with    Leg Pain     Ulcer to LLE since Wednesday; Pt`s wife applied "bleed stop" to varicose veins and states the ulcer appeared after removal of the "bleed stop"       Review of patient's allergies indicates:  No Known Allergies     HPI   HPI    1/23/2020, 2:46 PM   History obtained from the patient and his wife      History of Present Illness: Yan Pickering Jr. is a 79 y.o. male patient with a PMHx of Atrial fibrillation who presents to the Emergency Department for lower left leg ulcer, onset 8 days PTA. Pt's wife applied Bleed Stop to pt's LLE varicose veins 8 days ago, and states that the ulcer appeared after removing the Bleed Stop 7 days ago. Symptoms are constant and moderate in severity. No mitigating or exacerbating factors reported. Associated sxs include LLE pain. Patient denies any fever, chills, n/v/d, SOB, CP, weakness, numbness, dizziness, headache, and all other sxs at this time. Pt's wife states that she has been applying Polysporin ointment to the ulcer, and washing it with vinegar water. No further complaints or concerns at this time.     Arrival mode: Personal vehicle      PCP: Miller Dinero MD       Past Medical History:  Past Medical History:   Diagnosis Date    Atrial fibrillation     Cardiomyopathy 4/26/2017    CHF (congestive heart failure)     CKD (chronic kidney disease) stage 3, GFR 30-59 ml/min     Mild mitral insufficiency     " Moderate aortic insufficiency     Venous stasis dermatitis of both lower extremities        Past Surgical History:  History reviewed. No pertinent surgical history.      Family History:  Family History   Problem Relation Age of Onset    Heart disease Father        Social History:  Social History     Tobacco Use    Smoking status: Never Smoker    Smokeless tobacco: Never Used   Substance and Sexual Activity    Alcohol use: No    Drug use: No    Sexual activity: Not Currently       ROS   Review of Systems   Constitutional: Negative for chills, diaphoresis, fatigue and fever.   HENT: Negative for sore throat.    Respiratory: Negative for shortness of breath.    Cardiovascular: Negative for chest pain.   Gastrointestinal: Negative for diarrhea, nausea and vomiting.   Genitourinary: Negative for dysuria.   Musculoskeletal: Positive for myalgias (LLE). Negative for back pain.   Skin: Positive for wound (Ulcer to lower L leg). Negative for rash.   Neurological: Negative for dizziness, weakness, light-headedness, numbness and headaches.   Hematological: Does not bruise/bleed easily.   All other systems reviewed and are negative.    Physical Exam      Initial Vitals [01/23/20 1429]   BP Pulse Resp Temp SpO2   (!) 106/54 (S) (!) 144 18 98.3 °F (36.8 °C) 98 %      MAP       --          Physical Exam  Nursing Notes and Vital Signs Reviewed.  Constitutional: Patient is in no acute distress. Well-developed and well-nourished.  Head: Atraumatic. Normocephalic.  Eyes: PERRL. EOM intact. Conjunctivae are not pale. No scleral icterus.  ENT: Mucous membranes are moist. Oropharynx is clear and symmetric.    Neck: Supple. Full ROM. No lymphadenopathy.  Cardiovascular: Tachycardic. Irregularly irregular rhythm. No murmurs, rubs, or gallops. Distal pulses are 2+ and symmetric.  Pulmonary/Chest: No respiratory distress. Clear to auscultation bilaterally. No wheezing or rales.  Abdominal: Soft and non-distended.  There is no  tenderness.  No rebound, guarding, or rigidity.   Musculoskeletal: Moves all extremities. No obvious deformities.   LLE: no evident deformity. See pictures below for further details. ROM is normal. Cap refill distally is <2 seconds. DP and PT pulses are equal and 2+ bilaterally. No motor deficit. No distal sensory deficit  Skin: Warm and dry.    Neurological:  Alert, awake, and appropriate.  Normal speech.  No acute focal neurological deficits are appreciated.  Psychiatric: Normal affect. Good eye contact. Appropriate in content.              ED Course    Critical Care  Date/Time: 1/23/2020 7:57 PM  Performed by: Almaz Amaya MD  Authorized by: Almaz Amaya MD   Direct patient critical care time: 11 minutes  Additional history critical care time: 8 minutes  Ordering / reviewing critical care time: 7 minutes  Documentation critical care time: 6 minutes  Consulting other physicians critical care time: 5 minutes  Total critical care time (exclusive of procedural time) : 37 minutes  Critical care time was exclusive of separately billable procedures and treating other patients and teaching time.  Critical care was necessary to treat or prevent imminent or life-threatening deterioration of the following conditions: a fib with RVR.  Critical care was time spent personally by me on the following activities: blood draw for specimens, development of treatment plan with patient or surrogate, interpretation of cardiac output measurements, evaluation of patient's response to treatment, examination of patient, obtaining history from patient or surrogate, ordering and performing treatments and interventions, ordering and review of laboratory studies, ordering and review of radiographic studies, pulse oximetry, re-evaluation of patient's condition and review of old charts.        ED Vital Signs:  Vitals:    01/23/20 1700 01/23/20 1720 01/23/20 1722 01/23/20 1724   BP: (!) 101/53 127/60 (!) 114/58 (!) 104/51   Pulse:  "87 105 (!) 122 (!) 127   Resp: (!) 32      Temp:       TempSrc:       SpO2: 97%      Weight:       Height:        01/23/20 1815 01/23/20 1830 01/23/20 1902 01/23/20 1935   BP: (!) 126/57  (!) 117/58 (!) 140/59   Pulse: (!) 127 (!) 116 99 97   Resp: (!) 21 (!) 28 20 18   Temp:  98.2 °F (36.8 °C)  98.6 °F (37 °C)   TempSrc:  Oral     SpO2: 99% (!) 84% 98% 99%   Weight:       Height:        01/23/20 2014 01/23/20 2016 01/23/20 2154 01/23/20 2215   BP:  124/61 (!) 111/56 119/62   Pulse:  103 109 102   Resp:  18 18 18   Temp:  96.7 °F (35.9 °C) 97.6 °F (36.4 °C) 97.3 °F (36.3 °C)   TempSrc:   Oral Oral   SpO2:  97% 100% 100%   Weight: 56.5 kg (124 lb 9 oz)      Height: 5' 9" (1.753 m)       01/23/20 2230 01/23/20 2319 01/24/20 0153   BP: (!) 116/56 122/64 (!) 104/57   Pulse: 98 100 94   Resp: 18 18 16   Temp: 96.6 °F (35.9 °C) 97 °F (36.1 °C) 97.9 °F (36.6 °C)   TempSrc: Oral Oral Oral   SpO2: 99% 99% 95%   Weight:      Height:          Abnormal Lab Results:  Labs Reviewed   CBC W/ AUTO DIFFERENTIAL - Abnormal; Notable for the following components:       Result Value    RBC 2.53 (*)     Hemoglobin 7.4 (*)     Hematocrit 25.0 (*)     Mean Corpuscular Volume 99 (*)     Mean Corpuscular Hemoglobin Conc 29.6 (*)     RDW 14.7 (*)     Lymph # 0.8 (*)     Gran% 77.4 (*)     Lymph% 11.0 (*)     All other components within normal limits   COMPREHENSIVE METABOLIC PANEL - Abnormal; Notable for the following components:    Potassium 5.2 (*)     CO2 22 (*)     BUN, Bld 42 (*)     Creatinine 2.9 (*)     ALT 6 (*)     eGFR if  22 (*)     eGFR if non  19 (*)     All other components within normal limits   PROTIME-INR   CK   TROPONIN I   LACTIC ACID, PLASMA        All Lab Results:  Results for orders placed or performed during the hospital encounter of 01/23/20   CBC auto differential   Result Value Ref Range    WBC 7.09 3.90 - 12.70 K/uL    RBC 2.53 (L) 4.60 - 6.20 M/uL    Hemoglobin 7.4 (L) 14.0 - 18.0 " g/dL    Hematocrit 25.0 (L) 40.0 - 54.0 %    Mean Corpuscular Volume 99 (H) 82 - 98 fL    Mean Corpuscular Hemoglobin 29.2 27.0 - 31.0 pg    Mean Corpuscular Hemoglobin Conc 29.6 (L) 32.0 - 36.0 g/dL    RDW 14.7 (H) 11.5 - 14.5 %    Platelets 194 150 - 350 K/uL    MPV 11.0 9.2 - 12.9 fL    Immature Granulocytes 0.4 0.0 - 0.5 %    Gran # (ANC) 5.5 1.8 - 7.7 K/uL    Immature Grans (Abs) 0.03 0.00 - 0.04 K/uL    Lymph # 0.8 (L) 1.0 - 4.8 K/uL    Mono # 0.7 0.3 - 1.0 K/uL    Eos # 0.0 0.0 - 0.5 K/uL    Baso # 0.07 0.00 - 0.20 K/uL    nRBC 0 0 /100 WBC    Gran% 77.4 (H) 38.0 - 73.0 %    Lymph% 11.0 (L) 18.0 - 48.0 %    Mono% 9.9 4.0 - 15.0 %    Eosinophil% 0.3 0.0 - 8.0 %    Basophil% 1.0 0.0 - 1.9 %    Differential Method Automated    Comprehensive metabolic panel   Result Value Ref Range    Sodium 139 136 - 145 mmol/L    Potassium 5.2 (H) 3.5 - 5.1 mmol/L    Chloride 106 95 - 110 mmol/L    CO2 22 (L) 23 - 29 mmol/L    Glucose 106 70 - 110 mg/dL    BUN, Bld 42 (H) 8 - 23 mg/dL    Creatinine 2.9 (H) 0.5 - 1.4 mg/dL    Calcium 9.1 8.7 - 10.5 mg/dL    Total Protein 7.3 6.0 - 8.4 g/dL    Albumin 3.5 3.5 - 5.2 g/dL    Total Bilirubin 0.7 0.1 - 1.0 mg/dL    Alkaline Phosphatase 66 55 - 135 U/L    AST 11 10 - 40 U/L    ALT 6 (L) 10 - 44 U/L    Anion Gap 11 8 - 16 mmol/L    eGFR if African American 22 (A) >60 mL/min/1.73 m^2    eGFR if non African American 19 (A) >60 mL/min/1.73 m^2   Protime-INR   Result Value Ref Range    Prothrombin Time 11.9 9.0 - 12.5 sec    INR 1.1 0.8 - 1.2   CPK   Result Value Ref Range     20 - 200 U/L   Troponin I   Result Value Ref Range    Troponin I 0.012 0.000 - 0.026 ng/mL   Lactic acid, plasma   Result Value Ref Range    Lactate (Lactic Acid) 1.2 0.5 - 2.2 mmol/L   Troponin I   Result Value Ref Range    Troponin I 0.008 0.000 - 0.026 ng/mL   Type & Screen   Result Value Ref Range    Group & Rh O POS     Indirect Pete NEG    Prepare RBC 1 Unit   Result Value Ref Range    UNIT NUMBER  [id number]     Product Code [id number]     DISPENSE STATUS TRANSFUSED     CODING SYSTEM [id number]     Unit Blood Type Code 5100     Unit Blood Type O POS     Unit Expiration [id number]      Imaging Results:  Imaging Results          X-Ray Chest 1 View (Final result)  Result time 01/23/20 15:35:35    Final result by Samson Hernandez MD (01/23/20 15:35:35)                 Impression:      Cardiomegaly.  COPD.  No acute cardiopulmonary disease.      Electronically signed by: Samson Hernandez MD  Date:    01/23/2020  Time:    15:35             Narrative:    EXAMINATION:  XR CHEST 1 VIEW    CLINICAL HISTORY:  Palpitations    TECHNIQUE:  Single frontal view of the chest was performed.    COMPARISON:  03/16/2017    FINDINGS:  Cardiomegaly.  Coarsening of bronchovascular markings/COPD.  No acute infiltrate or consolidation.                               The EKG was ordered, reviewed, and independently interpreted by the ED provider.  Interpretation time: 14:29  Rate: 132 BPM  Rhythm: Atrial fibrillation with rapid ventricular response.  Interpretation: Nonspecific ST and T wave abnormality. No STEMI.           The Emergency Provider reviewed the vital signs and test results, which are outlined above.    ED Discussion     4:00 PM: Dr. Hernandez transfers care of pt to Dr. Amaya pending lab results.    6:10 PM: Re-evaluated pt. Pt is resting comfortably and is in no acute distress.  D/w pt all pertinent results and possible admission. D/w pt any concerns expressed at this time. Answered all questions. Pt expresses understanding at this time. HR was between 130-140s upon re-evaluation so pt will be put on IV and Metoprorol to slow HR down.     6:38 PM: Discussed case with Filipe Bloom NP ([facility]). Dr. Coredro agrees with current care and management of pt and accepts admission.   Admitting Service: Hospital Medicine  Admitting Physician: Dr. Cordero  Admit to: obs tele    7:23 PM: Re-evaluated pt. I have discussed test  results, shared treatment plan, and the need for admission with patient and family at bedside. Pt and family express understanding at this time and agree with all information. All questions answered. Pt and family have no further questions or concerns at this time. Pt is ready for admit.       ED Medication(s):  Medications   acetaminophen tablet 650 mg (has no administration in time range)   ondansetron injection 4 mg (has no administration in time range)   diphenhydrAMINE capsule 25 mg (has no administration in time range)   aluminum-magnesium hydroxide-simethicone 200-200-20 mg/5 mL suspension 30 mL (has no administration in time range)   albuterol-ipratropium 2.5 mg-0.5 mg/3 mL nebulizer solution 3 mL (has no administration in time range)   guaifenesin 100 mg/5 ml syrup 200 mg (has no administration in time range)   0.9%  NaCl infusion (for blood administration) (has no administration in time range)   metoprolol tartrate (LOPRESSOR) tablet 50 mg (50 mg Oral Given 1/23/20 2203)   doxycycline tablet 100 mg (100 mg Oral Given 1/23/20 2203)   vancomycin in dextrose 5 % 1 gram/250 mL IVPB 1,000 mg (1,000 mg Intravenous New Bag 1/23/20 1828)   metoprolol injection 5 mg (5 mg Intravenous Given 1/23/20 1941)          Current Discharge Medication List            Medical Decision Making    Medical Decision Making:   Clinical Tests:   Lab Tests: Ordered and Reviewed  Radiological Study: Ordered and Reviewed  Medical Tests: Ordered and Reviewed           Scribe Attestation:   Scribe #1: I performed the above scribed service and the documentation accurately describes the services I performed. I attest to the accuracy of the note.    Attending:   Physician Attestation Statement for Scribe #1: I, Samson Hernandez MD, personally performed the services described in this documentation, as scribed by Edinson Morse, in my presence, and it is both accurate and complete.       Scribe Attestation:   Scribe #2: I performed the above  scribed service and the documentation accurately describes the services I performed. I attest to the accuracy of the note.    Attending Attestation:           Physician Attestation for Scribe:    Physician Attestation Statement for Scribe #2: I, Almaz Amaya MD, reviewed documentation, as scribed by Tamera Miner in my presence, and it is both accurate and complete. I also acknowledge and confirm the content of the note done by Scribe #1.          Clinical Impression       ICD-10-CM ICD-9-CM   1. Anemia, unspecified type D64.9 285.9   2. Palpitations R00.2 785.1   3. Atrial fibrillation I48.91 427.31   4. Leg wound, left, initial encounter S81.802A 894.0   5. Atrial fibrillation with RVR I48.91 427.31   6. Atrial fibrillation with rapid ventricular response I48.91 427.31       Disposition:   Disposition: Placed in Observation  Condition: Stable         Almaz Amaya MD  01/24/20 0210

## 2020-01-24 VITALS
RESPIRATION RATE: 18 BRPM | WEIGHT: 124.56 LBS | HEIGHT: 69 IN | OXYGEN SATURATION: 98 % | SYSTOLIC BLOOD PRESSURE: 107 MMHG | HEART RATE: 91 BPM | BODY MASS INDEX: 18.45 KG/M2 | TEMPERATURE: 98 F | DIASTOLIC BLOOD PRESSURE: 64 MMHG

## 2020-01-24 PROBLEM — I48.91 ATRIAL FIBRILLATION WITH RVR: Status: RESOLVED | Noted: 2020-01-23 | Resolved: 2020-01-24

## 2020-01-24 LAB
ALBUMIN SERPL BCP-MCNC: 2.8 G/DL (ref 3.5–5.2)
ALP SERPL-CCNC: 55 U/L (ref 55–135)
ALT SERPL W/O P-5'-P-CCNC: <5 U/L (ref 10–44)
ANION GAP SERPL CALC-SCNC: 12 MMOL/L (ref 8–16)
AORTIC VALVE REGURGITATION: ABNORMAL
AST SERPL-CCNC: 11 U/L (ref 10–40)
BASOPHILS # BLD AUTO: 0.06 K/UL (ref 0–0.2)
BASOPHILS NFR BLD: 1.1 % (ref 0–1.9)
BILIRUB SERPL-MCNC: 0.5 MG/DL (ref 0.1–1)
BUN SERPL-MCNC: 39 MG/DL (ref 8–23)
CALCIUM SERPL-MCNC: 8.5 MG/DL (ref 8.7–10.5)
CHLORIDE SERPL-SCNC: 108 MMOL/L (ref 95–110)
CO2 SERPL-SCNC: 20 MMOL/L (ref 23–29)
CREAT SERPL-MCNC: 2.3 MG/DL (ref 0.5–1.4)
DIASTOLIC DYSFUNCTION: YES
DIFFERENTIAL METHOD: ABNORMAL
EOSINOPHIL # BLD AUTO: 0.1 K/UL (ref 0–0.5)
EOSINOPHIL NFR BLD: 1.3 % (ref 0–8)
ERYTHROCYTE [DISTWIDTH] IN BLOOD BY AUTOMATED COUNT: 14.4 % (ref 11.5–14.5)
EST. GFR  (AFRICAN AMERICAN): 30 ML/MIN/1.73 M^2
EST. GFR  (NON AFRICAN AMERICAN): 26 ML/MIN/1.73 M^2
ESTIMATED PA SYSTOLIC PRESSURE: 54
GLOBAL PERICARDIAL EFFUSION: ABNORMAL
GLUCOSE SERPL-MCNC: 81 MG/DL (ref 70–110)
HCT VFR BLD AUTO: 29.1 % (ref 40–54)
HGB BLD-MCNC: 8.7 G/DL (ref 14–18)
IMM GRANULOCYTES # BLD AUTO: 0.03 K/UL (ref 0–0.04)
IMM GRANULOCYTES NFR BLD AUTO: 0.5 % (ref 0–0.5)
LYMPHOCYTES # BLD AUTO: 1 K/UL (ref 1–4.8)
LYMPHOCYTES NFR BLD: 17.5 % (ref 18–48)
MAGNESIUM SERPL-MCNC: 2.1 MG/DL (ref 1.6–2.6)
MCH RBC QN AUTO: 29.9 PG (ref 27–31)
MCHC RBC AUTO-ENTMCNC: 29.9 G/DL (ref 32–36)
MCV RBC AUTO: 100 FL (ref 82–98)
MITRAL VALVE REGURGITATION: ABNORMAL
MONOCYTES # BLD AUTO: 0.8 K/UL (ref 0.3–1)
MONOCYTES NFR BLD: 14.2 % (ref 4–15)
NEUTROPHILS # BLD AUTO: 3.6 K/UL (ref 1.8–7.7)
NEUTROPHILS NFR BLD: 65.4 % (ref 38–73)
NRBC BLD-RTO: 0 /100 WBC
PHOSPHATE SERPL-MCNC: 3.8 MG/DL (ref 2.7–4.5)
PLATELET # BLD AUTO: 153 K/UL (ref 150–350)
PMV BLD AUTO: 10.9 FL (ref 9.2–12.9)
POTASSIUM SERPL-SCNC: 4.4 MMOL/L (ref 3.5–5.1)
PROT SERPL-MCNC: 5.9 G/DL (ref 6–8.4)
RBC # BLD AUTO: 2.91 M/UL (ref 4.6–6.2)
RETIRED EF AND QEF - SEE NOTES: 45 (ref 55–65)
SODIUM SERPL-SCNC: 140 MMOL/L (ref 136–145)
TRICUSPID VALVE REGURGITATION: ABNORMAL
TROPONIN I SERPL DL<=0.01 NG/ML-MCNC: 0.01 NG/ML (ref 0–0.03)
TROPONIN I SERPL DL<=0.01 NG/ML-MCNC: 0.01 NG/ML (ref 0–0.03)
WBC # BLD AUTO: 5.5 K/UL (ref 3.9–12.7)

## 2020-01-24 PROCEDURE — 93306 TTE W/DOPPLER COMPLETE: CPT | Mod: 26,HCNC,, | Performed by: INTERNAL MEDICINE

## 2020-01-24 PROCEDURE — 93306 TTE W/DOPPLER COMPLETE: CPT | Mod: HCNC

## 2020-01-24 PROCEDURE — 25000003 PHARM REV CODE 250: Mod: HCNC | Performed by: PHYSICIAN ASSISTANT

## 2020-01-24 PROCEDURE — 84484 ASSAY OF TROPONIN QUANT: CPT | Mod: HCNC

## 2020-01-24 PROCEDURE — 36415 COLL VENOUS BLD VENIPUNCTURE: CPT | Mod: HCNC

## 2020-01-24 PROCEDURE — 80053 COMPREHEN METABOLIC PANEL: CPT | Mod: HCNC

## 2020-01-24 PROCEDURE — 93306 2D ECHO WITH COLOR FLOW DOPPLER: ICD-10-PCS | Mod: 26,HCNC,, | Performed by: INTERNAL MEDICINE

## 2020-01-24 PROCEDURE — 99214 OFFICE O/P EST MOD 30 MIN: CPT | Mod: HCNC,,, | Performed by: INTERNAL MEDICINE

## 2020-01-24 PROCEDURE — 83735 ASSAY OF MAGNESIUM: CPT | Mod: HCNC

## 2020-01-24 PROCEDURE — G0378 HOSPITAL OBSERVATION PER HR: HCPCS | Mod: HCNC

## 2020-01-24 PROCEDURE — 99214 PR OFFICE/OUTPT VISIT, EST, LEVL IV, 30-39 MIN: ICD-10-PCS | Mod: HCNC,,, | Performed by: INTERNAL MEDICINE

## 2020-01-24 PROCEDURE — 36430 TRANSFUSION BLD/BLD COMPNT: CPT | Mod: HCNC

## 2020-01-24 PROCEDURE — 25000003 PHARM REV CODE 250: Mod: HCNC | Performed by: INTERNAL MEDICINE

## 2020-01-24 PROCEDURE — 84100 ASSAY OF PHOSPHORUS: CPT | Mod: HCNC

## 2020-01-24 PROCEDURE — 85025 COMPLETE CBC W/AUTO DIFF WBC: CPT | Mod: HCNC

## 2020-01-24 RX ORDER — METOPROLOL TARTRATE 100 MG/1
100 TABLET ORAL 2 TIMES DAILY
Qty: 60 TABLET | Refills: 1 | Status: SHIPPED | OUTPATIENT
Start: 2020-01-24 | End: 2020-02-10 | Stop reason: SDUPTHER

## 2020-01-24 RX ORDER — METOPROLOL TARTRATE 50 MG/1
100 TABLET ORAL 2 TIMES DAILY
Status: DISCONTINUED | OUTPATIENT
Start: 2020-01-24 | End: 2020-01-24 | Stop reason: HOSPADM

## 2020-01-24 RX ORDER — DOXYCYCLINE HYCLATE 100 MG
100 TABLET ORAL EVERY 12 HOURS
Qty: 14 TABLET | Refills: 0 | Status: SHIPPED | OUTPATIENT
Start: 2020-01-24 | End: 2020-01-31

## 2020-01-24 RX ORDER — METOPROLOL TARTRATE 50 MG/1
50 TABLET ORAL ONCE
Status: COMPLETED | OUTPATIENT
Start: 2020-01-24 | End: 2020-01-24

## 2020-01-24 RX ADMIN — METOPROLOL TARTRATE 50 MG: 50 TABLET ORAL at 11:01

## 2020-01-24 RX ADMIN — DOXYCYCLINE HYCLATE 100 MG: 100 TABLET, COATED ORAL at 08:01

## 2020-01-24 NOTE — ASSESSMENT & PLAN NOTE
Patient reports bleeding from the left foot varicose vein ulcer.  Currently nonbleeding.  Hemoglobin 7.4.  Transfuse 1 unit PRBC.  Wound care.

## 2020-01-24 NOTE — SUBJECTIVE & OBJECTIVE
Past Medical History:   Diagnosis Date    Atrial fibrillation     Cardiomyopathy 4/26/2017    CHF (congestive heart failure)     CKD (chronic kidney disease) stage 3, GFR 30-59 ml/min     Mild mitral insufficiency     Moderate aortic insufficiency     Venous stasis dermatitis of both lower extremities        History reviewed. No pertinent surgical history.    Review of patient's allergies indicates:  No Known Allergies    No current facility-administered medications on file prior to encounter.      Current Outpatient Medications on File Prior to Encounter   Medication Sig    apixaban (ELIQUIS) 2.5 mg Tab Take 1 tablet (2.5 mg total) by mouth 2 (two) times daily.    furosemide (LASIX) 40 MG tablet Take 1 tablet (40 mg total) by mouth once daily.    losartan (COZAAR) 25 MG tablet Take 1 tablet (25 mg total) by mouth once daily.    metoprolol tartrate (LOPRESSOR) 50 MG tablet Take 1 tab in the morning and 2 tabs in the evening    aspirin 81 MG Chew Take 1 tablet (81 mg total) by mouth once daily. (Patient not taking: Reported on 1/23/2020)     Family History     Problem Relation (Age of Onset)    Heart disease Father        Tobacco Use    Smoking status: Never Smoker    Smokeless tobacco: Never Used   Substance and Sexual Activity    Alcohol use: No    Drug use: No    Sexual activity: Not Currently     Review of Systems   Constitution: Negative.   HENT: Negative.    Eyes: Negative.    Cardiovascular: Negative.    Respiratory: Negative.    Endocrine: Negative.    Hematologic/Lymphatic: Bruises/bleeds easily.   Skin:        LLE with bleeding wound upon initial presentation   Musculoskeletal: Positive for arthritis and joint pain.   Gastrointestinal: Negative.    Genitourinary: Negative.    Neurological: Negative.    Psychiatric/Behavioral: Negative.    Allergic/Immunologic: Negative.      Objective:     Vital Signs (Most Recent):  Temp: 97.6 °F (36.4 °C) (01/24/20 0735)  Pulse: 89 (01/24/20 0735)  Resp:  16 (01/24/20 0735)  BP: (!) 103/51 (01/24/20 0735)  SpO2: 96 % (01/24/20 0735) Vital Signs (24h Range):  Temp:  [96.6 °F (35.9 °C)-98.6 °F (37 °C)] 97.6 °F (36.4 °C)  Pulse:  [] 89  Resp:  [16-32] 16  SpO2:  [84 %-100 %] 96 %  BP: (101-140)/(49-64) 103/51     Weight: 56.5 kg (124 lb 9 oz)  Body mass index is 18.39 kg/m².    SpO2: 96 %  O2 Device (Oxygen Therapy): room air      Intake/Output Summary (Last 24 hours) at 1/24/2020 1051  Last data filed at 1/24/2020 0553  Gross per 24 hour   Intake 620 ml   Output 375 ml   Net 245 ml       Lines/Drains/Airways     Peripheral Intravenous Line                 Peripheral IV - Single Lumen 01/23/20 1508 20 G Left Forearm less than 1 day         Peripheral IV - Single Lumen 01/23/20 1953 20 G Right Antecubital less than 1 day                Physical Exam   Constitutional: He is oriented to person, place, and time. He appears well-developed and well-nourished. No distress.   HENT:   Head: Normocephalic and atraumatic.   Eyes: Pupils are equal, round, and reactive to light. Right eye exhibits no discharge. Left eye exhibits no discharge.   Neck: Neck supple. No JVD present.   Cardiovascular: Normal rate, regular rhythm, S1 normal, S2 normal and normal heart sounds.   No murmur heard.  Pulmonary/Chest: Effort normal and breath sounds normal. No respiratory distress. He has no wheezes. He has no rales.   Abdominal: Soft. He exhibits no distension.   Musculoskeletal: He exhibits no edema.   Neurological: He is alert and oriented to person, place, and time.   Skin: Skin is warm and dry. He is not diaphoretic. No erythema.   CVI changes BLE   Psychiatric: He has a normal mood and affect. His behavior is normal. Thought content normal.   Nursing note and vitals reviewed.      Significant Labs:   CMP   Recent Labs   Lab 01/23/20  1508 01/24/20  0337    140   K 5.2* 4.4    108   CO2 22* 20*    81   BUN 42* 39*   CREATININE 2.9* 2.3*   CALCIUM 9.1 8.5*   PROT  7.3 5.9*   ALBUMIN 3.5 2.8*   BILITOT 0.7 0.5   ALKPHOS 66 55   AST 11 11   ALT 6* <5*   ANIONGAP 11 12   ESTGFRAFRICA 22* 30*   EGFRNONAA 19* 26*   , CBC   Recent Labs   Lab 01/23/20  1508 01/24/20  0337   WBC 7.09 5.50   HGB 7.4* 8.7*   HCT 25.0* 29.1*    153   , Troponin   Recent Labs   Lab 01/23/20  1951 01/24/20  0129 01/24/20  0656   TROPONINI 0.008 0.009 0.010    and All pertinent lab results from the last 24 hours have been reviewed.    Significant Imaging: Echocardiogram:   2D echo with color flow doppler:   Results for orders placed or performed during the hospital encounter of 01/23/20   2D echo with color flow doppler    Narrative    This study is in progress....   , EKG: Reviewed and X-Ray: CXR: X-Ray Chest 1 View (CXR):   Results for orders placed or performed during the hospital encounter of 01/23/20   X-Ray Chest 1 View    Narrative    EXAMINATION:  XR CHEST 1 VIEW    CLINICAL HISTORY:  Palpitations    TECHNIQUE:  Single frontal view of the chest was performed.    COMPARISON:  03/16/2017    FINDINGS:  Cardiomegaly.  Coarsening of bronchovascular markings/COPD.  No acute infiltrate or consolidation.      Impression    Cardiomegaly.  COPD.  No acute cardiopulmonary disease.      Electronically signed by: Samson Hernandez MD  Date:    01/23/2020  Time:    15:35    and X-Ray Chest PA and Lateral (CXR): No results found for this visit on 01/23/20.

## 2020-01-24 NOTE — CONSULTS
[facility]  Cardiology  Consult Note    Patient Name: Yan Pickering Jr.  MRN: [id number]  Admission Date: 1/23/2020  Hospital Length of Stay: 0 days  Code Status: Prior   Attending Provider: Gus Melendez MD   Consulting Provider: Ana Dale PA-C  Primary Care Physician: Miller Dinero MD  Principal Problem:Atrial fibrillation with RVR    Patient information was obtained from patient, past medical records and ER records.     Inpatient consult to Cardiology  Consult performed by: Ana Dale PA-C  Consult ordered by: Spike Cordero MD        Subjective:     Chief Complaint:  Wound to LLE     HPI:   Mr. Pickering is an 79 year old male patient whose current medical conditions include chronic afib (on Eliquis), systolic CHF/NICM, HTN, CKD, and AI who presented to [facility] ED yesterday with a complaint of bleeding/ozzing from his LLE. He denied any other symptoms-no SOB, chest pain, fever, or chills. Initial workup in ED revealed hemoglobin of 7.4 and creatinine of 2.9. EKG showed afib with RVR and patient subsequently admitted for further evaluation and treatment. Cardiology consulted to assist with management. Patient seen and examined today, resting in bed. Seems to feel well. No cardiac complaints. Remains chest pain free. Denies SOB. No s/s suggestive of CHF. He reports compliance with his medications, on Eliquis 2.5 mg BID for CVA prophylaxis. Chart reviewed. H/H improved post transfusion. Creatinine 2.3. Troponin x 3 negative. Repeat echo pending. Prior MPI stress test 3/17 negative.    Past Medical History:   Diagnosis Date    Atrial fibrillation     Cardiomyopathy 4/26/2017    CHF (congestive heart failure)     CKD (chronic kidney disease) stage 3, GFR 30-59 ml/min     Mild mitral insufficiency     Moderate aortic insufficiency     Venous stasis dermatitis of both lower extremities        History reviewed. No pertinent surgical history.    Review of patient's allergies  indicates:  No Known Allergies    No current facility-administered medications on file prior to encounter.      Current Outpatient Medications on File Prior to Encounter   Medication Sig    apixaban (ELIQUIS) 2.5 mg Tab Take 1 tablet (2.5 mg total) by mouth 2 (two) times daily.    furosemide (LASIX) 40 MG tablet Take 1 tablet (40 mg total) by mouth once daily.    losartan (COZAAR) 25 MG tablet Take 1 tablet (25 mg total) by mouth once daily.    metoprolol tartrate (LOPRESSOR) 50 MG tablet Take 1 tab in the morning and 2 tabs in the evening    aspirin 81 MG Chew Take 1 tablet (81 mg total) by mouth once daily. (Patient not taking: Reported on 1/23/2020)     Family History     Problem Relation (Age of Onset)    Heart disease Father        Tobacco Use    Smoking status: Never Smoker    Smokeless tobacco: Never Used   Substance and Sexual Activity    Alcohol use: No    Drug use: No    Sexual activity: Not Currently     Review of Systems   Constitution: Negative.   HENT: Negative.    Eyes: Negative.    Cardiovascular: Negative.    Respiratory: Negative.    Endocrine: Negative.    Hematologic/Lymphatic: Bruises/bleeds easily.   Skin:        LLE with bleeding wound upon initial presentation   Musculoskeletal: Positive for arthritis and joint pain.   Gastrointestinal: Negative.    Genitourinary: Negative.    Neurological: Negative.    Psychiatric/Behavioral: Negative.    Allergic/Immunologic: Negative.      Objective:     Vital Signs (Most Recent):  Temp: 97.6 °F (36.4 °C) (01/24/20 0735)  Pulse: 89 (01/24/20 0735)  Resp: 16 (01/24/20 0735)  BP: (!) 103/51 (01/24/20 0735)  SpO2: 96 % (01/24/20 0735) Vital Signs (24h Range):  Temp:  [96.6 °F (35.9 °C)-98.6 °F (37 °C)] 97.6 °F (36.4 °C)  Pulse:  [] 89  Resp:  [16-32] 16  SpO2:  [84 %-100 %] 96 %  BP: (101-140)/(49-64) 103/51     Weight: 56.5 kg (124 lb 9 oz)  Body mass index is 18.39 kg/m².    SpO2: 96 %  O2 Device (Oxygen Therapy): room air      Intake/Output  Summary (Last 24 hours) at 1/24/2020 1051  Last data filed at 1/24/2020 0553  Gross per 24 hour   Intake 620 ml   Output 375 ml   Net 245 ml       Lines/Drains/Airways     Peripheral Intravenous Line                 Peripheral IV - Single Lumen 01/23/20 1508 20 G Left Forearm less than 1 day         Peripheral IV - Single Lumen 01/23/20 1953 20 G Right Antecubital less than 1 day                Physical Exam   Constitutional: He is oriented to person, place, and time. He appears well-developed and well-nourished. No distress.   HENT:   Head: Normocephalic and atraumatic.   Eyes: Pupils are equal, round, and reactive to light. Right eye exhibits no discharge. Left eye exhibits no discharge.   Neck: Neck supple. No JVD present.   Cardiovascular: Normal rate, regular rhythm, S1 normal, S2 normal and normal heart sounds.   No murmur heard.  Pulmonary/Chest: Effort normal and breath sounds normal. No respiratory distress. He has no wheezes. He has no rales.   Abdominal: Soft. He exhibits no distension.   Musculoskeletal: He exhibits no edema.   Neurological: He is alert and oriented to person, place, and time.   Skin: Skin is warm and dry. He is not diaphoretic. No erythema.   CVI changes BLE   Psychiatric: He has a normal mood and affect. His behavior is normal. Thought content normal.   Nursing note and vitals reviewed.      Significant Labs:   CMP   Recent Labs   Lab 01/23/20  1508 01/24/20  0337    140   K 5.2* 4.4    108   CO2 22* 20*    81   BUN 42* 39*   CREATININE 2.9* 2.3*   CALCIUM 9.1 8.5*   PROT 7.3 5.9*   ALBUMIN 3.5 2.8*   BILITOT 0.7 0.5   ALKPHOS 66 55   AST 11 11   ALT 6* <5*   ANIONGAP 11 12   ESTGFRAFRICA 22* 30*   EGFRNONAA 19* 26*   , CBC   Recent Labs   Lab 01/23/20  1508 01/24/20  0337   WBC 7.09 5.50   HGB 7.4* 8.7*   HCT 25.0* 29.1*    153   , Troponin   Recent Labs   Lab 01/23/20 1951 01/24/20  0129 01/24/20  0656   TROPONINI 0.008 0.009 0.010    and All pertinent lab  results from the last 24 hours have been reviewed.    Significant Imaging: Echocardiogram:   2D echo with color flow doppler:   Results for orders placed or performed during the hospital encounter of 01/23/20   2D echo with color flow doppler    Narrative    This study is in progress....   , EKG: Reviewed and X-Ray: CXR: X-Ray Chest 1 View (CXR):   Results for orders placed or performed during the hospital encounter of 01/23/20   X-Ray Chest 1 View    Narrative    EXAMINATION:  XR CHEST 1 VIEW    CLINICAL HISTORY:  Palpitations    TECHNIQUE:  Single frontal view of the chest was performed.    COMPARISON:  03/16/2017    FINDINGS:  Cardiomegaly.  Coarsening of bronchovascular markings/COPD.  No acute infiltrate or consolidation.      Impression    Cardiomegaly.  COPD.  No acute cardiopulmonary disease.      Electronically signed by: Samson Hernandez MD  Date:    01/23/2020  Time:    15:35    and X-Ray Chest PA and Lateral (CXR): No results found for this visit on 01/23/20.    Assessment and Plan:   Patient who presents with afib with RVR, exacerbated by worsening anemia. BB dosage increased. Assess response. Check 2D echo. Recommend resuming Eliquis for CVA prophylaxis, when deemed safe to do so. Follow-up in clinic.    * Atrial fibrillation with RVR  -Patient with chronic afib history, likely exacerbated by worsening anemia  -Increase Lopressor to 100 mg BID and assess response  -Recommend re-starting Eliquis for CVA prophylaxis when deemed safe to do so    Symptomatic anemia  -H/H initially 7.4/25.0  -Improved to 8.7/29.1  -Mgmt and further workup as per hospital medicine    Bleeding from varicose vein  -Stable since admission    Chronic systolic congestive heart failure  -Clinically compensated  -Continue BB, ARB    CKD (chronic kidney disease) stage 4, GFR 15-29 ml/min  -Monitor  -Recommend nephrology f/u as outpatient        VTE Risk Mitigation (From admission, onward)         Ordered     Place sequential compression  device  Until discontinued      01/23/20 1916                Thank you for your consult. I will follow-up with patient. Please contact us if you have any additional questions.    Ana Dale PA-C  Cardiology   [facility]

## 2020-01-24 NOTE — ASSESSMENT & PLAN NOTE
Initial heart rate in the 120s to 130s.  Received metoprolol 5 mg IV x1 in the ED.  Resume home dose metoprolol.  Hold apixaban due to bleeding varicose ulcer and symptomatic anemia.  Check echocardiogram.  Consult cardiology in a.m..

## 2020-01-24 NOTE — DISCHARGE SUMMARY
[facility] Hospital Medicine  Discharge Summary      Patient Name: Yan Pickering Jr.  MRN: [id number]  Admission Date: 1/23/2020  Hospital Length of Stay: 0 days  Discharge Date and Time:  01/24/2020 3:10 PM  Attending Physician: Gus Melendez MD   Discharging Provider: Francisco Khan NP  Primary Care Provider: Miller Dinero MD      HPI:   Mr. Pickering is a pleasant elderly 79-year-old  male with PMH significant for atrial fibrillation, maintained on apixaban, has been suffering with varicose veins.  Patient's wife as applied compression stockings recently and patient's left lower extremity/ankle developed an ulcer.  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  Patient complains of mild bleeding from the ulcer in the left ankle/foot region.  He received time dose off vancomycin.  In the ED he was found to be in atrial fibrillation with RVR, HR in the 120s to 130s, rate controlled with metoprolol 5 mg IV x1.  Laboratory workup revealed multiple abnormalities including hemoglobin of 7.4, creatinine 2.9 (close to his baseline of 2.5).  Potassium 5.2.  Scheduled to receive 1 unit PRBC transfusion.    Admitting diagnosis atrial fibrillation with RVR, symptomatic anemia, CKD stage 4, mild bleeding from left foot ulcer.    * No surgery found *      Hospital Course:   1/24/20 The patient is now rate controlled. He was evaluated by Cardiology who recommended increasing metoprolol to 100 BID. Wound care evaluated lower extremity wounds and recommended painting the area with iodine and leaving open to air. Home health for wound care was offered but the patients wife declined. The patient was seen and examined today and deemed stable for discharge. The patient is being discharged home on a 7 day course of doxycycline. He will follow up with his PCP and with Cardiology.      Consults:   Consults (From admission, onward)        Status Ordering Provider      Inpatient consult to Cardiology  Once     Provider:  Kash Herrera MD    Completed SUNITA MÉNDEZ          No new Assessment & Plan notes have been filed under this hospital service since the last note was generated.  Service: Hospital Medicine    Final Active Diagnoses:    Diagnosis Date Noted POA    Symptomatic anemia [D64.9] 01/23/2020 Yes    Bleeding from varicose vein [I83.899] 12/16/2017 Yes    Chronic systolic congestive heart failure [I50.22] 03/16/2017 Yes    CKD (chronic kidney disease) stage 4, GFR 15-29 ml/min [N18.4]  Yes      Problems Resolved During this Admission:    Diagnosis Date Noted Date Resolved POA    PRINCIPAL PROBLEM:  Atrial fibrillation with RVR [I48.91] 01/23/2020 01/24/2020 Yes       Discharged Condition: stable    Disposition: Home or Self Care    Follow Up:  Follow-up Information     Miller Dinero MD. Schedule an appointment as soon as possible for a visit in 3 days.    Specialty:  Internal Medicine  Contact information:  [address]  [address]  [facility] [address]  [phone]             ELAN Moreira. Schedule an appointment as soon as possible for a visit in 2 weeks.    Specialty:  Cardiology  Contact information:  [address]  [place] [address]  [phone]                 Patient Instructions:   No discharge procedures on file.    Significant Diagnostic Studies:   Imaging Results          X-Ray Chest 1 View (Final result)  Result time 01/23/20 15:35:35    Final result by Samson Hernandez MD (01/23/20 15:35:35)                 Impression:      Cardiomegaly.  COPD.  No acute cardiopulmonary disease.      Electronically signed by: Samson Hernandez MD  Date:    01/23/2020  Time:    15:35             Narrative:    EXAMINATION:  XR CHEST 1 VIEW    CLINICAL HISTORY:  Palpitations    TECHNIQUE:  Single frontal view of the chest was performed.    COMPARISON:  03/16/2017    FINDINGS:  Cardiomegaly.  Coarsening of bronchovascular markings/COPD.  No acute  infiltrate or consolidation.                                Pending Diagnostic Studies:     None         Medications:  Reconciled Home Medications:      Medication List      START taking these medications    doxycycline 100 MG tablet  Commonly known as:  VIBRA-TABS  Take 1 tablet (100 mg total) by mouth every 12 (twelve) hours. for 7 days        CHANGE how you take these medications    metoprolol tartrate 100 MG tablet  Commonly known as:  LOPRESSOR  Take 1 tablet (100 mg total) by mouth 2 (two) times daily.  What changed:    · medication strength  · how much to take  · how to take this  · when to take this  · additional instructions        CONTINUE taking these medications    apixaban 2.5 mg Tab  Commonly known as:  Eliquis  Take 1 tablet (2.5 mg total) by mouth 2 (two) times daily.     aspirin 81 MG Chew  Take 1 tablet (81 mg total) by mouth once daily.     furosemide 40 MG tablet  Commonly known as:  LASIX  Take 1 tablet (40 mg total) by mouth once daily.     losartan 25 MG tablet  Commonly known as:  COZAAR  Take 1 tablet (25 mg total) by mouth once daily.            Indwelling Lines/Drains at time of discharge:   Lines/Drains/Airways     None                 Time spent on the discharge of patient: > 35 minutes  Patient was seen and examined on the date of discharge and determined to be suitable for discharge.         Francisco Khan NP  Department of Hospital Medicine  [facility]

## 2020-01-24 NOTE — HPI
Mr. Pickering is an 79 year old male patient whose current medical conditions include chronic afib (on Eliquis), systolic CHF/NICM, HTN, CKD, and AI who presented to [facility] ED yesterday with a complaint of bleeding/ozzing from his LLE. He denied any other symptoms-no SOB, chest pain, fever, or chills. Initial workup in ED revealed hemoglobin of 7.4 and creatinine of 2.9. EKG showed afib with RVR and patient subsequently admitted for further evaluation and treatment. Cardiology consulted to assist with management. Patient seen and examined today, resting in bed. Seems to feel well. No cardiac complaints. Remains chest pain free. Denies SOB. No s/s suggestive of CHF. He reports compliance with his medications, on Eliquis 2.5 mg BID for CVA prophylaxis. Chart reviewed. H/H improved post transfusion. Creatinine 2.3. Troponin x 3 negative. Repeat echo pending. Prior MPI stress test 3/17 negative.

## 2020-01-24 NOTE — PLAN OF CARE
Problem: Adult Inpatient Plan of Care  Goal: Plan of Care Review  Outcome: Ongoing, Progressing  Flowsheets (Taken 1/24/2020 1422)  Plan of Care Reviewed With: patient; other (see comments) (Ex-wife)  Plan of care reviewed with patient and ex-wife, both verbalized understanding.  Pt remains free from falls this shift, fall precautions in place.  Pt remains free from skin breakdown, he turns and repositions independently in bed and is up to the restroom with assist.  AAOx4, VSS, NAD noted at this time.  Pt remained afebrile.  Room air.  A-fib on the tele monitor.  Pt admitted for bleeding to a varicose vein on his left foot. Pt also had decreased H/H, received 1 unit PRBCs on 1/23/2020.  Pt currently resting comfortably in bed.  Hourly rounding complete.  Ex-wife remains at the bedside.  Will continue to monitor.

## 2020-01-24 NOTE — NURSING
Went over discharge instructions with patient.   Stressed importance of making and keeping all follow ups as well as making prescribed medication changes.   Prescriptions sent to pt's requested pharmacy.  Patient verbalized understanding and has had all questions in regards to discharge answered to satisfaction.  IV removed without complications.  Telemetry box  removed and returned to monitor tech.  Patient instructed to call nurse station once dressed and ready to be discharged via wheelchair to personal transportation.

## 2020-01-24 NOTE — SUBJECTIVE & OBJECTIVE
Past Medical History:   Diagnosis Date    Atrial fibrillation     Cardiomyopathy 4/26/2017    CHF (congestive heart failure)     CKD (chronic kidney disease) stage 3, GFR 30-59 ml/min     Mild mitral insufficiency     Moderate aortic insufficiency     Venous stasis dermatitis of both lower extremities        History reviewed. No pertinent surgical history.    Review of patient's allergies indicates:  No Known Allergies    No current facility-administered medications on file prior to encounter.      Current Outpatient Medications on File Prior to Encounter   Medication Sig    apixaban (ELIQUIS) 2.5 mg Tab Take 1 tablet (2.5 mg total) by mouth 2 (two) times daily.    furosemide (LASIX) 40 MG tablet Take 1 tablet (40 mg total) by mouth once daily.    losartan (COZAAR) 25 MG tablet Take 1 tablet (25 mg total) by mouth once daily.    metoprolol tartrate (LOPRESSOR) 50 MG tablet Take 1 tab in the morning and 2 tabs in the evening    aspirin 81 MG Chew Take 1 tablet (81 mg total) by mouth once daily. (Patient not taking: Reported on 1/23/2020)     Family History     Problem Relation (Age of Onset)    Heart disease Father        Tobacco Use    Smoking status: Never Smoker    Smokeless tobacco: Never Used   Substance and Sexual Activity    Alcohol use: No    Drug use: No    Sexual activity: Not Currently     Review of Systems   Constitutional: Positive for fatigue. Negative for appetite change and fever.   HENT: Negative.  Negative for congestion, nosebleeds and sore throat.    Eyes: Negative.  Negative for visual disturbance.   Respiratory: Positive for shortness of breath. Negative for cough and wheezing.    Cardiovascular: Positive for leg swelling. Negative for chest pain and palpitations.   Gastrointestinal: Negative.  Negative for abdominal pain, constipation, diarrhea, nausea and vomiting.   Endocrine: Negative.  Negative for polyuria.   Genitourinary: Negative.  Negative for dysuria, flank pain,  frequency and urgency.   Musculoskeletal: Negative.  Negative for arthralgias, back pain and joint swelling.   Skin: Positive for color change (Chronic skin discoloration lower extremities) and wound (Left foot non bleeding ulcer). Negative for pallor and rash.   Allergic/Immunologic: Negative.    Neurological: Positive for weakness (Generalized). Negative for dizziness, syncope, light-headedness, numbness and headaches.   Hematological: Negative.    Psychiatric/Behavioral: Negative.  Negative for confusion and hallucinations. The patient is not nervous/anxious.    All other systems reviewed and are negative.    Objective:     Vital Signs (Most Recent):  Temp: 96.7 °F (35.9 °C) (01/23/20 2016)  Pulse: 103 (01/23/20 2016)  Resp: 18 (01/23/20 2016)  BP: 124/61 (01/23/20 2016)  SpO2: 97 % (01/23/20 2016) Vital Signs (24h Range):  Temp:  [96.7 °F (35.9 °C)-98.6 °F (37 °C)] 96.7 °F (35.9 °C)  Pulse:  [] 103  Resp:  [18-32] 18  SpO2:  [84 %-100 %] 97 %  BP: (101-140)/(49-61) 124/61     Weight: 56.5 kg (124 lb 9 oz)  Body mass index is 18.39 kg/m².    Physical Exam   Constitutional: He is oriented to person, place, and time. No distress.   Pleasant elderly  male, in no respiratory distress.  No family at the bedside.  Currently in atrial fibrillation on the monitor.   HENT:   Head: Normocephalic and atraumatic.   Eyes: Conjunctivae are normal. No scleral icterus.   Neck: Normal range of motion. Neck supple. No thyromegaly present.   Cardiovascular: An irregularly irregular rhythm present. Tachycardia present.   Murmur heard.  Pulmonary/Chest: Effort normal. No respiratory distress. He has no wheezes. He exhibits no tenderness.   Abdominal: Soft. Bowel sounds are normal. There is no tenderness.   Musculoskeletal: Normal range of motion. He exhibits edema (1+ bilateral pretibial pitting edema). He exhibits no tenderness.   Neurological: He is alert and oriented to person, place, and time. No cranial nerve  deficit. He exhibits normal muscle tone. Coordination normal.   Skin: Skin is warm and dry. He is not diaphoretic. There is erythema (Chronic venous stasis dermatitis of the bilateral lower extremities, with superimposed erythema at places.  Also there is a black eschar/nonbleeding ulcer in the dorsum of the left foot.).   Psychiatric: He has a normal mood and affect. His behavior is normal. Thought content normal.   Nursing note and vitals reviewed.          Significant Labs:   Bilirubin:   Recent Labs   Lab 01/23/20  1508   BILITOT 0.7     Blood Culture: No results for input(s): LABBLOO in the last 48 hours.  BMP:   Recent Labs   Lab 01/23/20  1508         K 5.2*      CO2 22*   BUN 42*   CREATININE 2.9*   CALCIUM 9.1     CBC:   Recent Labs   Lab 01/23/20  1508   WBC 7.09   HGB 7.4*   HCT 25.0*        CMP:   Recent Labs   Lab 01/23/20  1508      K 5.2*      CO2 22*      BUN 42*   CREATININE 2.9*   CALCIUM 9.1   PROT 7.3   ALBUMIN 3.5   BILITOT 0.7   ALKPHOS 66   AST 11   ALT 6*   ANIONGAP 11   EGFRNONAA 19*     Coagulation:   Recent Labs   Lab 01/23/20  1508   INR 1.1     Lactic Acid:   Recent Labs   Lab 01/23/20  1736   LACTATE 1.2     Troponin:   Recent Labs   Lab 01/23/20  1508 01/23/20  1951   TROPONINI 0.012 0.008     All pertinent labs within the past 24 hours have been reviewed.    Significant Imaging: I have reviewed and interpreted all pertinent imaging results/findings within the past 24 hours.     Imaging Results          X-Ray Chest 1 View (Final result)  Result time 01/23/20 15:35:35    Final result by Samson Hernandez MD (01/23/20 15:35:35)                 Impression:      Cardiomegaly.  COPD.  No acute cardiopulmonary disease.      Electronically signed by: Samson Hernandez MD  Date:    01/23/2020  Time:    15:35             Narrative:    EXAMINATION:  XR CHEST 1 VIEW    CLINICAL HISTORY:  Palpitations    TECHNIQUE:  Single frontal view of the chest was  performed.    COMPARISON:  03/16/2017    FINDINGS:  Cardiomegaly.  Coarsening of bronchovascular markings/COPD.  No acute infiltrate or consolidation.                                I have independently reviewed and interpreted the EKG.     I have independently reviewed all pertinent labs within the past 24 hours.    I have independently reviewed, visualized and interpreted all pertinent imaging results within the past 24 hours and discussed the findings with the ED physician, Dr. NEHA Amaya

## 2020-01-24 NOTE — CONSULTS
Consulted to this 79 year old male patient for LLE ulcers. PMH significant for atrial fibrillation, maintained on apixaban, has been suffering with varicose veins, patient currently admitted with Afib with RVR. He is awake and alert. His ex wife is present at the bedside. BLE noted with significant dark discoloration/hemosiderin staining. multiple ulcerations to his LLE and to his left dorsal foot.all ulcerations intact, covered with hard dark eschar. They are painful to touch and are more characteristic of arterial ulcers vs venous. Painted all with betadine and left MARLINE. Patients ex wife disagreeable with all recommendations. She states that she has been taking care of him for years and knows what to do, then proceeds to ask for advice, but then disagrees with recommendation. She doesn't want home health, to follow up with vascular, or to go to outpatient wound care. She only wants to be given supplies so she can perform care herself. Gave her betadine and recommended that he follow up with his PCP so that they can go over options. They verbalize understanding. See below for recommendations.    Ulcerations to LLE:  1. Paint with betadine daily and leave MARLINE

## 2020-01-24 NOTE — H&P
"[facility] Hospital Medicine  History & Physical    Patient Name: Yan Pickering Jr.  MRN: [id number]  Admission Date: 1/23/2020  Attending Physician: Spike Cordero MD  Primary Care Provider: Miller Dinero MD         Patient information was obtained from patient, past medical records and ER records.     Subjective:     Principal Problem:Atrial fibrillation with RVR    Chief Complaint:   Chief Complaint   Patient presents with    Leg Pain     Ulcer to E since Wednesday; Pt`s wife applied "bleed stop" to varicose veins and states the ulcer appeared after removal of the "bleed stop"        HPI: Mr. Pickering is a pleasant elderly 79-year-old  male with PMH significant for atrial fibrillation, maintained on apixaban, has been suffering with varicose veins.  Patient's wife as applied compression stockings recently and patient's left lower extremity/ankle developed an ulcer.  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  Patient complains of mild bleeding from the ulcer in the left ankle/foot region.  He received time dose off vancomycin.  In the ED he was found to be in atrial fibrillation with RVR, HR in the 120s to 130s, rate controlled with metoprolol 5 mg IV x1.  Laboratory workup revealed multiple abnormalities including hemoglobin of 7.4, creatinine 2.9 (close to his baseline of 2.5).  Potassium 5.2.  Scheduled to receive 1 unit PRBC transfusion.    Admitting diagnosis atrial fibrillation with RVR, symptomatic anemia, CKD stage 4, mild bleeding from left foot ulcer.    Past Medical History:   Diagnosis Date    Atrial fibrillation     Cardiomyopathy 4/26/2017    CHF (congestive heart failure)     CKD (chronic kidney disease) stage 3, GFR 30-59 ml/min     Mild mitral insufficiency     Moderate aortic insufficiency     Venous stasis dermatitis of both lower extremities        History reviewed. No pertinent surgical history.    Review of " patient's allergies indicates:  No Known Allergies    No current facility-administered medications on file prior to encounter.      Current Outpatient Medications on File Prior to Encounter   Medication Sig    apixaban (ELIQUIS) 2.5 mg Tab Take 1 tablet (2.5 mg total) by mouth 2 (two) times daily.    furosemide (LASIX) 40 MG tablet Take 1 tablet (40 mg total) by mouth once daily.    losartan (COZAAR) 25 MG tablet Take 1 tablet (25 mg total) by mouth once daily.    metoprolol tartrate (LOPRESSOR) 50 MG tablet Take 1 tab in the morning and 2 tabs in the evening    aspirin 81 MG Chew Take 1 tablet (81 mg total) by mouth once daily. (Patient not taking: Reported on 1/23/2020)     Family History     Problem Relation (Age of Onset)    Heart disease Father        Tobacco Use    Smoking status: Never Smoker    Smokeless tobacco: Never Used   Substance and Sexual Activity    Alcohol use: No    Drug use: No    Sexual activity: Not Currently     Review of Systems   Constitutional: Positive for fatigue. Negative for appetite change and fever.   HENT: Negative.  Negative for congestion, nosebleeds and sore throat.    Eyes: Negative.  Negative for visual disturbance.   Respiratory: Positive for shortness of breath. Negative for cough and wheezing.    Cardiovascular: Positive for leg swelling. Negative for chest pain and palpitations.   Gastrointestinal: Negative.  Negative for abdominal pain, constipation, diarrhea, nausea and vomiting.   Endocrine: Negative.  Negative for polyuria.   Genitourinary: Negative.  Negative for dysuria, flank pain, frequency and urgency.   Musculoskeletal: Negative.  Negative for arthralgias, back pain and joint swelling.   Skin: Positive for color change (Chronic skin discoloration lower extremities) and wound (Left foot non bleeding ulcer). Negative for pallor and rash.   Allergic/Immunologic: Negative.    Neurological: Positive for weakness (Generalized). Negative for dizziness, syncope,  light-headedness, numbness and headaches.   Hematological: Negative.    Psychiatric/Behavioral: Negative.  Negative for confusion and hallucinations. The patient is not nervous/anxious.    All other systems reviewed and are negative.    Objective:     Vital Signs (Most Recent):  Temp: 96.7 °F (35.9 °C) (01/23/20 2016)  Pulse: 103 (01/23/20 2016)  Resp: 18 (01/23/20 2016)  BP: 124/61 (01/23/20 2016)  SpO2: 97 % (01/23/20 2016) Vital Signs (24h Range):  Temp:  [96.7 °F (35.9 °C)-98.6 °F (37 °C)] 96.7 °F (35.9 °C)  Pulse:  [] 103  Resp:  [18-32] 18  SpO2:  [84 %-100 %] 97 %  BP: (101-140)/(49-61) 124/61     Weight: 56.5 kg (124 lb 9 oz)  Body mass index is 18.39 kg/m².    Physical Exam   Constitutional: He is oriented to person, place, and time. No distress.   Pleasant elderly  male, in no respiratory distress.  No family at the bedside.  Currently in atrial fibrillation on the monitor.   HENT:   Head: Normocephalic and atraumatic.   Eyes: Conjunctivae are normal. No scleral icterus.   Neck: Normal range of motion. Neck supple. No thyromegaly present.   Cardiovascular: An irregularly irregular rhythm present. Tachycardia present.   Murmur heard.  Pulmonary/Chest: Effort normal. No respiratory distress. He has no wheezes. He exhibits no tenderness.   Abdominal: Soft. Bowel sounds are normal. There is no tenderness.   Musculoskeletal: Normal range of motion. He exhibits edema (1+ bilateral pretibial pitting edema). He exhibits no tenderness.   Neurological: He is alert and oriented to person, place, and time. No cranial nerve deficit. He exhibits normal muscle tone. Coordination normal.   Skin: Skin is warm and dry. He is not diaphoretic. There is erythema (Chronic venous stasis dermatitis of the bilateral lower extremities, with superimposed erythema at places.  Also there is a black eschar/nonbleeding ulcer in the dorsum of the left foot.).   Psychiatric: He has a normal mood and affect. His behavior is  normal. Thought content normal.   Nursing note and vitals reviewed.          Significant Labs:   Bilirubin:   Recent Labs   Lab 01/23/20  1508   BILITOT 0.7     Blood Culture: No results for input(s): LABBLOO in the last 48 hours.  BMP:   Recent Labs   Lab 01/23/20  1508         K 5.2*      CO2 22*   BUN 42*   CREATININE 2.9*   CALCIUM 9.1     CBC:   Recent Labs   Lab 01/23/20  1508   WBC 7.09   HGB 7.4*   HCT 25.0*        CMP:   Recent Labs   Lab 01/23/20  1508      K 5.2*      CO2 22*      BUN 42*   CREATININE 2.9*   CALCIUM 9.1   PROT 7.3   ALBUMIN 3.5   BILITOT 0.7   ALKPHOS 66   AST 11   ALT 6*   ANIONGAP 11   EGFRNONAA 19*     Coagulation:   Recent Labs   Lab 01/23/20  1508   INR 1.1     Lactic Acid:   Recent Labs   Lab 01/23/20  1736   LACTATE 1.2     Troponin:   Recent Labs   Lab 01/23/20  1508 01/23/20  1951   TROPONINI 0.012 0.008     All pertinent labs within the past 24 hours have been reviewed.    Significant Imaging: I have reviewed and interpreted all pertinent imaging results/findings within the past 24 hours.     Imaging Results          X-Ray Chest 1 View (Final result)  Result time 01/23/20 15:35:35    Final result by Samson Hernandez MD (01/23/20 15:35:35)                 Impression:      Cardiomegaly.  COPD.  No acute cardiopulmonary disease.      Electronically signed by: Samson Hernandez MD  Date:    01/23/2020  Time:    15:35             Narrative:    EXAMINATION:  XR CHEST 1 VIEW    CLINICAL HISTORY:  Palpitations    TECHNIQUE:  Single frontal view of the chest was performed.    COMPARISON:  03/16/2017    FINDINGS:  Cardiomegaly.  Coarsening of bronchovascular markings/COPD.  No acute infiltrate or consolidation.                                I have independently reviewed and interpreted the EKG.     I have independently reviewed all pertinent labs within the past 24 hours.    I have independently reviewed, visualized and interpreted all pertinent  imaging results within the past 24 hours and discussed the findings with the ED physician, Dr. NEHA Amaya            Assessment/Plan:     * Atrial fibrillation with RVR  Initial heart rate in the 120s to 130s.  Received metoprolol 5 mg IV x1 in the ED.  Resume home dose metoprolol.  Hold apixaban due to bleeding varicose ulcer and symptomatic anemia.  Check echocardiogram.  Consult cardiology in a.m..      Symptomatic anemia  Patient complains of fatigue, subjective shortness of breath.  Hemoglobin 7.4.  With recent history of bleeding varicose ulcer of the left foot.  No crackles on exam, chest x-ray without vascular congestion.  Transfuse 1 unit PRBC.      Bleeding from varicose vein  Patient reports bleeding from the left foot varicose vein ulcer.  Currently nonbleeding.  Hemoglobin 7.4.  Transfuse 1 unit PRBC.  Wound care.      Chronic systolic congestive heart failure  Echocardiogram 2017 reviewed, EF 30% with no diastolic dysfunction.  Patient complains of subjective shortness of breath, but no evidence of crackles on exam.  Chest x-ray without evidence of infiltrates, masses or effusions.  Hold Lasix for now due to worsening renal function.      CKD (chronic kidney disease) stage 4, GFR 15-29 ml/min  Serum creatinine 2.9, slightly elevated than his baseline of 2.5.  Monitor closely.  Avoid any nephrotoxin agents.  Hold ARB.        VTE Risk Mitigation (From admission, onward)         Ordered     Place sequential compression device  Until discontinued      01/23/20 1916                   Spike Cordero MD  Department of Hospital Medicine   [facility]

## 2020-01-24 NOTE — NURSING
Patient ambulated with assistance to the bed. Monitor 8610 placed. Primary nurse at the bedside. Will continue to monitor.

## 2020-01-24 NOTE — ASSESSMENT & PLAN NOTE
Echocardiogram 2017 reviewed, EF 30% with no diastolic dysfunction.  Patient complains of subjective shortness of breath, but no evidence of crackles on exam.  Chest x-ray without evidence of infiltrates, masses or effusions.  Hold Lasix for now due to worsening renal function.

## 2020-01-24 NOTE — ASSESSMENT & PLAN NOTE
Patient complains of fatigue, subjective shortness of breath.  Hemoglobin 7.4.  With recent history of bleeding varicose ulcer of the left foot.  No crackles on exam, chest x-ray without vascular congestion.  Transfuse 1 unit PRBC.

## 2020-01-24 NOTE — HPI
Mr. Pickering is a pleasant elderly 79-year-old  male with PMH significant for atrial fibrillation, maintained on apixaban, has been suffering with varicose veins.  Patient's wife as applied compression stockings recently and patient's left lower extremity/ankle developed an ulcer.  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  Patient complains of mild bleeding from the ulcer in the left ankle/foot region.  He received time dose off vancomycin.  In the ED he was found to be in atrial fibrillation with RVR, HR in the 120s to 130s, rate controlled with metoprolol 5 mg IV x1.  Laboratory workup revealed multiple abnormalities including hemoglobin of 7.4, creatinine 2.9 (close to his baseline of 2.5).  Potassium 5.2.  Scheduled to receive 1 unit PRBC transfusion.    Admitting diagnosis atrial fibrillation with RVR, symptomatic anemia, CKD stage 4, mild bleeding from left foot ulcer.

## 2020-01-24 NOTE — ED NOTES
Pt lying in bed comfortably. AAO x 4. Resp even and unlabored with equal chest rise and fall. Skin warm and dry. 20G PIV noted to L FA. Flushes well, drg CDI. Side rails up x 2. Call light within reach. No distress noted. Pt denies any needs or assist at this time.

## 2020-01-24 NOTE — ASSESSMENT & PLAN NOTE
-Patient with chronic afib history, likely exacerbated by worsening anemia  -Increase Lopressor to 100 mg BID and assess response  -Recommend re-starting Eliquis for CVA prophylaxis when deemed safe to do so

## 2020-01-24 NOTE — HOSPITAL COURSE
1/24/20 The patient is now rate controlled. He was evaluated by Cardiology who recommended increasing metoprolol to 100 BID. Wound care evaluated lower extremity wounds and recommended painting the area with iodine and leaving open to air. Home health for wound care was offered but the patients wife declined. The patient was seen and examined today and deemed stable for discharge. The patient is being discharged home on a 7 day course of doxycycline. He will follow up with his PCP and with Cardiology.

## 2020-01-24 NOTE — ASSESSMENT & PLAN NOTE
Serum creatinine 2.9, slightly elevated than his baseline of 2.5.  Monitor closely.  Avoid any nephrotoxin agents.  Hold ARB.

## 2020-01-24 NOTE — PLAN OF CARE
CM spoke to patient who is awake, alert, and able to make needs known. Patient reports that he lives with his friend who is by the bedside. Patient's friend states that she is the patient's ex-wife and the patient lives with her. Patient reports that he does not use any assistive equipment at home or oxygen. Patient's ex-wife states that they do not need any assistive equipment at home and she will be able to perform wound care on the patient once she speaks to wound care nurse. Patient states that at this time he does not need any services set up at home and declines any community resources. CM provided a transitional care folder, information on advanced directives, information on pharmacy bedside delivery, and discharge planning begins on admission with contact information for any needs/questions.    D/C Plan: Home   PCP: Dr. Dinero   Preferred Pharmacy:   Discharge transportation: Friend   My [facility]:  Pharmacy Bedside Delivery:    [facility] [address] [place] [id number]  Phone: [phone] Fax: [phone]       01/24/20 1123   Discharge Assessment   Assessment Type Discharge Planning Assessment   Confirmed/corrected address and phone number on facesheet? Yes   Assessment information obtained from? Patient   Expected Length of Stay (days)   (TBD )   Communicated expected length of stay with patient/caregiver yes   Prior to hospitilization cognitive status: Alert/Oriented   Prior to hospitalization functional status: Independent   Current cognitive status: Alert/Oriented   Current Functional Status: Independent   Facility Arrived From: Home    Lives With friend(s)   Able to Return to Prior Arrangements yes   Is patient able to care for self after discharge? Yes   Who are your caregiver(s) and their phone number(s)? Chikis ( friend) [phone]   Patient's perception of discharge disposition home or selfcare   Patient currently being  followed by outpatient case management? No   Patient currently receives any other outside agency services? No   Equipment Currently Used at Home none   Do you have any problems affording any of your prescribed medications? No   Is the patient taking medications as prescribed? yes   Does the patient have transportation home? Yes   Transportation Anticipated family or friend will provide   Does the patient receive services at the [facility]? No   Discharge Plan A Home with family   DME Needed Upon Discharge  none   Patient/Family in Agreement with Plan yes

## 2020-01-27 ENCOUNTER — OFFICE VISIT (OUTPATIENT)
Dept: INTERNAL MEDICINE | Facility: CLINIC | Age: 80
End: 2020-01-27
Payer: MEDICARE

## 2020-01-27 VITALS
DIASTOLIC BLOOD PRESSURE: 72 MMHG | HEART RATE: 66 BPM | WEIGHT: 123.69 LBS | SYSTOLIC BLOOD PRESSURE: 120 MMHG | OXYGEN SATURATION: 96 % | TEMPERATURE: 96 F | BODY MASS INDEX: 18.26 KG/M2

## 2020-01-27 DIAGNOSIS — N18.4 CKD (CHRONIC KIDNEY DISEASE) STAGE 4, GFR 15-29 ML/MIN: ICD-10-CM

## 2020-01-27 DIAGNOSIS — L97.428: Primary | ICD-10-CM

## 2020-01-27 DIAGNOSIS — D64.9 SYMPTOMATIC ANEMIA: ICD-10-CM

## 2020-01-27 DIAGNOSIS — I50.9 CONGESTIVE HEART FAILURE, UNSPECIFIED HF CHRONICITY, UNSPECIFIED HEART FAILURE TYPE: ICD-10-CM

## 2020-01-27 DIAGNOSIS — I83.024: Primary | ICD-10-CM

## 2020-01-27 PROCEDURE — 99215 PR OFFICE/OUTPT VISIT, EST, LEVL V, 40-54 MIN: ICD-10-PCS | Mod: HCNC,S$GLB,, | Performed by: FAMILY MEDICINE

## 2020-01-27 PROCEDURE — 99999 PR PBB SHADOW E&M-EST. PATIENT-LVL IV: ICD-10-PCS | Mod: PBBFAC,HCNC,, | Performed by: FAMILY MEDICINE

## 2020-01-27 PROCEDURE — 1101F PT FALLS ASSESS-DOCD LE1/YR: CPT | Mod: HCNC,CPTII,S$GLB, | Performed by: FAMILY MEDICINE

## 2020-01-27 PROCEDURE — 99215 OFFICE O/P EST HI 40 MIN: CPT | Mod: HCNC,S$GLB,, | Performed by: FAMILY MEDICINE

## 2020-01-27 PROCEDURE — 1126F AMNT PAIN NOTED NONE PRSNT: CPT | Mod: HCNC,S$GLB,, | Performed by: FAMILY MEDICINE

## 2020-01-27 PROCEDURE — 99999 PR PBB SHADOW E&M-EST. PATIENT-LVL IV: CPT | Mod: PBBFAC,HCNC,, | Performed by: FAMILY MEDICINE

## 2020-01-27 PROCEDURE — 99499 UNLISTED E&M SERVICE: CPT | Mod: HCNC,S$GLB,, | Performed by: FAMILY MEDICINE

## 2020-01-27 PROCEDURE — 1126F PR PAIN SEVERITY QUANTIFIED, NO PAIN PRESENT: ICD-10-PCS | Mod: HCNC,S$GLB,, | Performed by: FAMILY MEDICINE

## 2020-01-27 PROCEDURE — 1101F PR PT FALLS ASSESS DOC 0-1 FALLS W/OUT INJ PAST YR: ICD-10-PCS | Mod: HCNC,CPTII,S$GLB, | Performed by: FAMILY MEDICINE

## 2020-01-27 PROCEDURE — 99499 RISK ADDL DX/OHS AUDIT: ICD-10-PCS | Mod: HCNC,S$GLB,, | Performed by: FAMILY MEDICINE

## 2020-01-27 PROCEDURE — 1159F PR MEDICATION LIST DOCUMENTED IN MEDICAL RECORD: ICD-10-PCS | Mod: HCNC,S$GLB,, | Performed by: FAMILY MEDICINE

## 2020-01-27 PROCEDURE — 1159F MED LIST DOCD IN RCRD: CPT | Mod: HCNC,S$GLB,, | Performed by: FAMILY MEDICINE

## 2020-01-27 NOTE — PROGRESS NOTES
"Subjective:       Patient ID: Yan Pickering Jr. is a 79 y.o. male.    Chief Complaint: No chief complaint on file.    HPI     79    PMH  CHF - EF 45% / Diastolic Dysfunction (Per 1/24/20 ECHO)  Pulm. HTN  Cardiomyopathy  A. Fib  Anticoagulated  Varicose veins  Venous stasis  Severe kyphosis  CKD III-IV      Social History     Tobacco Use   Smoking Status Never Smoker   Smokeless Tobacco Never Used     Family History   Problem Relation Age of Onset    Heart disease Father          Recent admisison - for L. Ankle ulcer  Found to be in A. Fib w RVR    Reviewed labs  Noted anemic    Admitted for A. Fib with RVR, symptomatic anemia, CKD IV, and bleeding L. Foot Ulcer.    Cards increased BB to metoprolol 100 mg BID  Wound care done  Home health Offered at hospital - but declined.  Discharged with intention to f/u with myself and cardiology    Reviewed labs  CKD III-IV  Protein/albumin low albumin 2.8 , total protein 5.9  Significant anemia - 7.4 initially  Day of discharge 8.7  HyperK - 5.2 - resolved during admission    He feels pain is less  Feels wound improving  Tolerating abx    Patient is hesistant to pursue investigations or continued Dr. Tsai  He speaks of wanted to " work things " out himself  Patient does not like going to the Doctor.  Ex-wife is caretaker - reports she has to fight him to go to the doctors.          Review of Systems   Constitutional: Negative for activity change.   Eyes: Negative for discharge.   Respiratory: Negative for wheezing.    Cardiovascular: Negative for chest pain and palpitations.   Gastrointestinal: Negative for constipation, diarrhea and vomiting.   Genitourinary: Negative for difficulty urinating and hematuria.   Neurological: Negative for headaches.   Psychiatric/Behavioral: Negative for dysphoric mood.       Objective:       Vitals:    01/27/20 1130   BP: 120/72   Pulse: 66   Temp: 96.3 °F (35.7 °C)       Physical Exam   Constitutional: He is oriented to person, place, and " time. He appears well-developed and well-nourished. No distress.   HENT:   Head: Normocephalic and atraumatic.   Eyes: Conjunctivae are normal. Right eye exhibits no discharge. Left eye exhibits no discharge.   Neck: Trachea normal and full passive range of motion without pain.   Cardiovascular: Normal rate, regular rhythm and normal heart sounds.   Pulmonary/Chest: Effort normal and breath sounds normal. No respiratory distress. He has no decreased breath sounds. He has no wheezes.   Abdominal: Soft. Normal appearance. There is no tenderness.   Musculoskeletal: He exhibits no edema or deformity.   Lymphadenopathy:     He has no cervical adenopathy.   Neurological: He is alert and oriented to person, place, and time.   Skin: Skin is intact. No pallor.   Psychiatric: He has a normal mood and affect. His speech is normal and behavior is normal. Thought content normal.       Assessment:       1. Venous stasis ulcer of left midfoot with other ulcer severity with varicose veins    2. CKD (chronic kidney disease) stage 4, GFR 15-29 ml/min    3. Symptomatic anemia    4. Congestive heart failure, unspecified HF chronicity, unspecified heart failure type        Plan:     Left Foot Ulcer  On abx  He feels it is improving  Hard to say  Close follow to re-assess  Continue current abx.  No changes at present  Will arrange for wound care    CHF/A. Fib  Scheduled to see cards  Anti-coagulation - of concern given degree of anemia    Anemia  Significant  Needs further evaluation to determine source      Edema  Continue compression     Given age, co-morbidities, acute issues ( wound/ anemia ) and his hesitancy in pursing investigations/ appts plan on consulting hospice for evaluation and discussion with patient/ education on such.  Will arrange for hospice evaluation to come to house and discuss  If he opts out of that - and plan on obtaining a hematology evaluation for this anemia and a nephrology evaluation as it appears now CKD  IV.    I do want to check in on his wound - close follow up indicated  If we can get him wound care in next few days i'll defer tx there. If appt cannot be scheduled - plan for me to see patient again on thursday  To re-evaluate the wound / assess for need to change antibiotics.        Will set F/U for Thursday - 3 days

## 2020-01-28 LAB — BACTERIA BLD CULT: NORMAL

## 2020-01-29 LAB — BACTERIA BLD CULT: NORMAL

## 2020-02-10 ENCOUNTER — LAB VISIT (OUTPATIENT)
Dept: LAB | Facility: HOSPITAL | Age: 80
End: 2020-02-10
Attending: NURSE PRACTITIONER
Payer: MEDICARE

## 2020-02-10 ENCOUNTER — OFFICE VISIT (OUTPATIENT)
Dept: CARDIOLOGY | Facility: CLINIC | Age: 80
End: 2020-02-10
Payer: MEDICARE

## 2020-02-10 ENCOUNTER — TELEPHONE (OUTPATIENT)
Dept: CARDIOLOGY | Facility: CLINIC | Age: 80
End: 2020-02-10

## 2020-02-10 VITALS
BODY MASS INDEX: 18.26 KG/M2 | HEART RATE: 96 BPM | OXYGEN SATURATION: 98 % | SYSTOLIC BLOOD PRESSURE: 102 MMHG | HEIGHT: 69 IN | DIASTOLIC BLOOD PRESSURE: 58 MMHG | WEIGHT: 123.25 LBS

## 2020-02-10 DIAGNOSIS — I48.20 CHRONIC ATRIAL FIBRILLATION: Primary | ICD-10-CM

## 2020-02-10 DIAGNOSIS — I50.22 CHRONIC SYSTOLIC CONGESTIVE HEART FAILURE: ICD-10-CM

## 2020-02-10 DIAGNOSIS — I48.20 CHRONIC ATRIAL FIBRILLATION: ICD-10-CM

## 2020-02-10 LAB
BASOPHILS # BLD AUTO: 0.07 K/UL (ref 0–0.2)
BASOPHILS NFR BLD: 1.4 % (ref 0–1.9)
DIFFERENTIAL METHOD: ABNORMAL
EOSINOPHIL # BLD AUTO: 0.1 K/UL (ref 0–0.5)
EOSINOPHIL NFR BLD: 1.6 % (ref 0–8)
ERYTHROCYTE [DISTWIDTH] IN BLOOD BY AUTOMATED COUNT: 14.6 % (ref 11.5–14.5)
HCT VFR BLD AUTO: 32 % (ref 40–54)
HGB BLD-MCNC: 9.8 G/DL (ref 14–18)
IMM GRANULOCYTES # BLD AUTO: 0.03 K/UL (ref 0–0.04)
IMM GRANULOCYTES NFR BLD AUTO: 0.6 % (ref 0–0.5)
LYMPHOCYTES # BLD AUTO: 0.9 K/UL (ref 1–4.8)
LYMPHOCYTES NFR BLD: 19 % (ref 18–48)
MCH RBC QN AUTO: 30.4 PG (ref 27–31)
MCHC RBC AUTO-ENTMCNC: 30.6 G/DL (ref 32–36)
MCV RBC AUTO: 99 FL (ref 82–98)
MONOCYTES # BLD AUTO: 0.7 K/UL (ref 0.3–1)
MONOCYTES NFR BLD: 13.2 % (ref 4–15)
NEUTROPHILS # BLD AUTO: 3.2 K/UL (ref 1.8–7.7)
NEUTROPHILS NFR BLD: 64.8 % (ref 38–73)
NRBC BLD-RTO: 0 /100 WBC
PLATELET # BLD AUTO: 157 K/UL (ref 150–350)
PMV BLD AUTO: 10.1 FL (ref 9.2–12.9)
RBC # BLD AUTO: 3.22 M/UL (ref 4.6–6.2)
WBC # BLD AUTO: 4.94 K/UL (ref 3.9–12.7)

## 2020-02-10 PROCEDURE — 36415 COLL VENOUS BLD VENIPUNCTURE: CPT | Mod: HCNC

## 2020-02-10 PROCEDURE — 99999 PR PBB SHADOW E&M-EST. PATIENT-LVL III: ICD-10-PCS | Mod: PBBFAC,HCNC,, | Performed by: NURSE PRACTITIONER

## 2020-02-10 PROCEDURE — 1101F PT FALLS ASSESS-DOCD LE1/YR: CPT | Mod: HCNC,CPTII,S$GLB, | Performed by: NURSE PRACTITIONER

## 2020-02-10 PROCEDURE — 1159F PR MEDICATION LIST DOCUMENTED IN MEDICAL RECORD: ICD-10-PCS | Mod: HCNC,S$GLB,, | Performed by: NURSE PRACTITIONER

## 2020-02-10 PROCEDURE — 99214 PR OFFICE/OUTPT VISIT, EST, LEVL IV, 30-39 MIN: ICD-10-PCS | Mod: HCNC,S$GLB,, | Performed by: NURSE PRACTITIONER

## 2020-02-10 PROCEDURE — 99999 PR PBB SHADOW E&M-EST. PATIENT-LVL III: CPT | Mod: PBBFAC,HCNC,, | Performed by: NURSE PRACTITIONER

## 2020-02-10 PROCEDURE — 99214 OFFICE O/P EST MOD 30 MIN: CPT | Mod: HCNC,S$GLB,, | Performed by: NURSE PRACTITIONER

## 2020-02-10 PROCEDURE — 1101F PR PT FALLS ASSESS DOC 0-1 FALLS W/OUT INJ PAST YR: ICD-10-PCS | Mod: HCNC,CPTII,S$GLB, | Performed by: NURSE PRACTITIONER

## 2020-02-10 PROCEDURE — 85025 COMPLETE CBC W/AUTO DIFF WBC: CPT | Mod: HCNC

## 2020-02-10 PROCEDURE — 1159F MED LIST DOCD IN RCRD: CPT | Mod: HCNC,S$GLB,, | Performed by: NURSE PRACTITIONER

## 2020-02-10 RX ORDER — METOPROLOL TARTRATE 100 MG/1
100 TABLET ORAL 2 TIMES DAILY
Qty: 60 TABLET | Refills: 11 | Status: SHIPPED | OUTPATIENT
Start: 2020-02-10 | End: 2020-03-27 | Stop reason: SDUPTHER

## 2020-02-10 RX ORDER — FUROSEMIDE 40 MG/1
40 TABLET ORAL DAILY
Qty: 30 TABLET | Refills: 11 | Status: SHIPPED | OUTPATIENT
Start: 2020-02-10 | End: 2020-08-18 | Stop reason: SDUPTHER

## 2020-02-10 NOTE — PROGRESS NOTES
Subjective:   Patient ID:  Yan Pickering Jr. is a 79 y.o. male who presents for follow up of Chronic atrial fibrillation      HPI   Mr. 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   Echo in Jan 2020 showed LVEF 45%, DD     Admitted in January 2020 with bleeding varicose vein to left foot. Required blood transfusion. Noted to have A-fib with RVR. Metoprolol increased to 100mg BID.   GI workup recommended, but patient declined EGD and colonoscopy.    Seen by PCP for left foot wound that he is not wanting to pursue treatment.  PCP has discussed possible hospice with patient, but not interested. Is interested in , wound care.     Denies any chest pain, SOB, LUNA,  orthopnea, PND, dizziness, palpitations,  near syncope, syncope or edema . Has no symptoms concerning for angina or equivalent. No CNS Complaints to suggest TIA or CVA. Does well with limiting sodium intake.  Currently taking Lasix PRN.   Luz Maria function stable     Past Medical History:   Diagnosis Date    Atrial fibrillation     Cardiomyopathy 4/26/2017    CHF (congestive heart failure)     CKD (chronic kidney disease) stage 3, GFR 30-59 ml/min     Mild mitral insufficiency     Moderate aortic insufficiency     Venous stasis dermatitis of both lower extremities        History reviewed. No pertinent surgical history.    Social History     Tobacco Use    Smoking status: Never Smoker    Smokeless tobacco: Never Used   Substance Use Topics    Alcohol use: No    Drug use: No       Family History   Problem Relation Age of Onset    Heart disease Father        Current Outpatient Medications   Medication Sig    apixaban (ELIQUIS) 2.5 mg Tab Take 1 tablet (2.5 mg total) by mouth 2 (two) times daily.    furosemide (LASIX) 40 MG tablet Take 1 tablet (40 mg total) by mouth once daily.    losartan  (COZAAR) 25 MG tablet Take 1 tablet (25 mg total) by mouth once daily.    metoprolol tartrate (LOPRESSOR) 100 MG tablet Take 1 tablet (100 mg total) by mouth 2 (two) times daily.     No current facility-administered medications for this visit.      Current Outpatient Medications on File Prior to Visit   Medication Sig    apixaban (ELIQUIS) 2.5 mg Tab Take 1 tablet (2.5 mg total) by mouth 2 (two) times daily.    losartan (COZAAR) 25 MG tablet Take 1 tablet (25 mg total) by mouth once daily.    [DISCONTINUED] furosemide (LASIX) 40 MG tablet Take 1 tablet (40 mg total) by mouth once daily.    [DISCONTINUED] metoprolol tartrate (LOPRESSOR) 100 MG tablet Take 1 tablet (100 mg total) by mouth 2 (two) times daily.    [DISCONTINUED] aspirin 81 MG Chew Take 1 tablet (81 mg total) by mouth once daily.     No current facility-administered medications on file prior to visit.        Review of Systems   Constitution: Negative for diaphoresis, malaise/fatigue, weight gain and weight loss.   HENT: Negative for congestion and nosebleeds.    Cardiovascular: Negative for chest pain, claudication, cyanosis, dyspnea on exertion, irregular heartbeat, leg swelling, near-syncope, orthopnea, palpitations, paroxysmal nocturnal dyspnea and syncope.   Respiratory: Negative for cough, hemoptysis, shortness of breath, sleep disturbances due to breathing, snoring, sputum production and wheezing.    Hematologic/Lymphatic: Negative for bleeding problem. Does not bruise/bleed easily.   Skin: Negative for rash.        Left foot varicose vein    Musculoskeletal: Negative for arthritis, back pain, falls, joint pain, muscle cramps and muscle weakness.   Gastrointestinal: Negative for abdominal pain, constipation, diarrhea, heartburn, hematemesis, hematochezia, melena, nausea and vomiting.   Genitourinary: Negative for dysuria, hematuria and nocturia.   Neurological: Negative for excessive daytime sleepiness, dizziness, headaches, light-headedness,  "loss of balance, numbness, vertigo and weakness.       Objective:   Physical Exam   Constitutional: He is oriented to person, place, and time. He appears well-developed and well-nourished.   Neck: Neck supple. No JVD present.   Cardiovascular: Normal rate, normal heart sounds and normal pulses. An irregularly irregular rhythm present. Exam reveals no friction rub.   No murmur heard.  Pulmonary/Chest: Effort normal and breath sounds normal. No respiratory distress. He has no wheezes. He has no rales.   Abdominal: Soft. Bowel sounds are normal. He exhibits no distension.   Musculoskeletal: He exhibits no edema or tenderness.   Neurological: He is alert and oriented to person, place, and time.   Skin: Skin is warm and dry. No rash noted.   Spider veins to BLE   Varicose veins    Psychiatric: He has a normal mood and affect. His behavior is normal.   Nursing note and vitals reviewed.    Vitals:    02/10/20 1415   BP: (!) 102/58   BP Location: Left arm   Patient Position: Sitting   Pulse: 96   SpO2: 98%   Weight: 55.9 kg (123 lb 3.8 oz)   Height: 5' 9" (1.753 m)     No results found for: CHOL  No results found for: HDL  No results found for: LDLCALC  No results found for: TRIG  No results found for: CHOLHDL    Chemistry        Component Value Date/Time     01/24/2020 0337    K 4.4 01/24/2020 0337     01/24/2020 0337    CO2 20 (L) 01/24/2020 0337    BUN 39 (H) 01/24/2020 0337    CREATININE 2.3 (H) 01/24/2020 0337    GLU 81 01/24/2020 0337        Component Value Date/Time    CALCIUM 8.5 (L) 01/24/2020 0337    ALKPHOS 55 01/24/2020 0337    AST 11 01/24/2020 0337    ALT <5 (L) 01/24/2020 0337    BILITOT 0.5 01/24/2020 0337    ESTGFRAFRICA 30 (A) 01/24/2020 0337    EGFRNONAA 26 (A) 01/24/2020 0337          Lab Results   Component Value Date    TSH 0.973 04/15/2013     Lab Results   Component Value Date    INR 1.1 01/23/2020    INR 1.2 03/16/2017    INR 1.1 04/21/2015     Lab Results   Component Value Date    WBC " 5.50 01/24/2020    HGB 8.7 (L) 01/24/2020    HCT 29.1 (L) 01/24/2020     (H) 01/24/2020     01/24/2020     BMP  Sodium   Date Value Ref Range Status   01/24/2020 140 136 - 145 mmol/L Final     Potassium   Date Value Ref Range Status   01/24/2020 4.4 3.5 - 5.1 mmol/L Final     Chloride   Date Value Ref Range Status   01/24/2020 108 95 - 110 mmol/L Final     CO2   Date Value Ref Range Status   01/24/2020 20 (L) 23 - 29 mmol/L Final     BUN, Bld   Date Value Ref Range Status   01/24/2020 39 (H) 8 - 23 mg/dL Final     Creatinine   Date Value Ref Range Status   01/24/2020 2.3 (H) 0.5 - 1.4 mg/dL Final     Calcium   Date Value Ref Range Status   01/24/2020 8.5 (L) 8.7 - 10.5 mg/dL Final     Anion Gap   Date Value Ref Range Status   01/24/2020 12 8 - 16 mmol/L Final     eGFR if    Date Value Ref Range Status   01/24/2020 30 (A) >60 mL/min/1.73 m^2 Final     eGFR if non    Date Value Ref Range Status   01/24/2020 26 (A) >60 mL/min/1.73 m^2 Final     Comment:     Calculation used to obtain the estimated glomerular filtration  rate (eGFR) is the CKD-EPI equation.        CrCl cannot be calculated (Patient's most recent lab result is older than the maximum 7 days allowed.).    Assessment:     1. Chronic atrial fibrillation    2. Chronic systolic congestive heart failure        Plan:   Chronic atrial fibrillation  Continue metoprolol and Eliquis   Stop ASA   Stat CBC today   If H/H continue to drop, it may be best for him to stop Eliquis and take ASA only   He is not interested in GI workup  Patient wanting conservative medical management     Chronic systolic congestive heart failure  Continue metoprolol  continue Lasix PRN   Heart healthy diet  Limit fluid intake 50-60 oz   Daily weights and to notify clinic if weight increases by more than 3 lbs in 1 day or 5 lbs in 1 week.   Exercise routine as tolerated    RTC in 6 months or sooner if needed pending lab results

## 2020-02-14 ENCOUNTER — OFFICE VISIT (OUTPATIENT)
Dept: INTERNAL MEDICINE | Facility: CLINIC | Age: 80
End: 2020-02-14
Payer: MEDICARE

## 2020-02-14 VITALS
TEMPERATURE: 96 F | BODY MASS INDEX: 18.26 KG/M2 | WEIGHT: 123.69 LBS | SYSTOLIC BLOOD PRESSURE: 100 MMHG | OXYGEN SATURATION: 98 % | DIASTOLIC BLOOD PRESSURE: 72 MMHG | HEART RATE: 100 BPM

## 2020-02-14 DIAGNOSIS — I83.024: ICD-10-CM

## 2020-02-14 DIAGNOSIS — I48.20 CHRONIC ATRIAL FIBRILLATION: ICD-10-CM

## 2020-02-14 DIAGNOSIS — I10 HYPERTENSION, UNSPECIFIED TYPE: ICD-10-CM

## 2020-02-14 DIAGNOSIS — L97.428: ICD-10-CM

## 2020-02-14 DIAGNOSIS — I50.9 CONGESTIVE HEART FAILURE, UNSPECIFIED HF CHRONICITY, UNSPECIFIED HEART FAILURE TYPE: ICD-10-CM

## 2020-02-14 DIAGNOSIS — D64.9 ANEMIA, UNSPECIFIED TYPE: Primary | ICD-10-CM

## 2020-02-14 PROCEDURE — 99214 OFFICE O/P EST MOD 30 MIN: CPT | Mod: HCNC,S$GLB,, | Performed by: FAMILY MEDICINE

## 2020-02-14 PROCEDURE — 3078F PR MOST RECENT DIASTOLIC BLOOD PRESSURE < 80 MM HG: ICD-10-PCS | Mod: HCNC,CPTII,S$GLB, | Performed by: FAMILY MEDICINE

## 2020-02-14 PROCEDURE — 1101F PT FALLS ASSESS-DOCD LE1/YR: CPT | Mod: HCNC,CPTII,S$GLB, | Performed by: FAMILY MEDICINE

## 2020-02-14 PROCEDURE — 1159F MED LIST DOCD IN RCRD: CPT | Mod: HCNC,S$GLB,, | Performed by: FAMILY MEDICINE

## 2020-02-14 PROCEDURE — 1101F PR PT FALLS ASSESS DOC 0-1 FALLS W/OUT INJ PAST YR: ICD-10-PCS | Mod: HCNC,CPTII,S$GLB, | Performed by: FAMILY MEDICINE

## 2020-02-14 PROCEDURE — 99214 PR OFFICE/OUTPT VISIT, EST, LEVL IV, 30-39 MIN: ICD-10-PCS | Mod: HCNC,S$GLB,, | Performed by: FAMILY MEDICINE

## 2020-02-14 PROCEDURE — 99999 PR PBB SHADOW E&M-EST. PATIENT-LVL III: CPT | Mod: PBBFAC,HCNC,, | Performed by: FAMILY MEDICINE

## 2020-02-14 PROCEDURE — 3074F SYST BP LT 130 MM HG: CPT | Mod: HCNC,CPTII,S$GLB, | Performed by: FAMILY MEDICINE

## 2020-02-14 PROCEDURE — 3078F DIAST BP <80 MM HG: CPT | Mod: HCNC,CPTII,S$GLB, | Performed by: FAMILY MEDICINE

## 2020-02-14 PROCEDURE — 1159F PR MEDICATION LIST DOCUMENTED IN MEDICAL RECORD: ICD-10-PCS | Mod: HCNC,S$GLB,, | Performed by: FAMILY MEDICINE

## 2020-02-14 PROCEDURE — 99999 PR PBB SHADOW E&M-EST. PATIENT-LVL III: ICD-10-PCS | Mod: PBBFAC,HCNC,, | Performed by: FAMILY MEDICINE

## 2020-02-14 PROCEDURE — 3074F PR MOST RECENT SYSTOLIC BLOOD PRESSURE < 130 MM HG: ICD-10-PCS | Mod: HCNC,CPTII,S$GLB, | Performed by: FAMILY MEDICINE

## 2020-02-14 NOTE — PROGRESS NOTES
Subjective:       Patient ID: Yan Pickering Jr. is a 79 y.o. male.    Chief Complaint: No chief complaint on file.    HPI     79     PMH  CHF - EF 45% / Diastolic Dysfunction (Per 1/24/20 ECHO)  Pulm. HTN  Cardiomyopathy  A. Fib  Anticoagulated  Varicose veins  Venous stasis  Severe kyphosis  CKD III-IV    Social History     Tobacco Use   Smoking Status Never Smoker   Smokeless Tobacco Never Used     Family History   Problem Relation Age of Onset    Heart disease Father      Last visit   1/27/20  Was on abx for L foot ulcer    Arranged for hospice to discuss with patient.  Declined    Wound care order placed  Went yesterday  Debridement  Painful experience    Doing good today.      Reviewed cards f/u note on 2/10/20         Review of Systems   Constitutional: Negative for activity change.   Eyes: Negative for discharge.   Respiratory: Negative for wheezing.    Cardiovascular: Negative for chest pain and palpitations.   Gastrointestinal: Negative for constipation, diarrhea and vomiting.   Genitourinary: Negative for difficulty urinating and hematuria.   Neurological: Negative for headaches.   Psychiatric/Behavioral: Negative for dysphoric mood.       Objective:       Vitals:    02/14/20 0823   BP: 100/72   Pulse: 100   Temp: 96 °F (35.6 °C)       Physical Exam   Constitutional: No distress.   HENT:   Head: Normocephalic.   Eyes: Conjunctivae are normal.   Cardiovascular: Normal rate and regular rhythm.   Pulmonary/Chest: Effort normal and breath sounds normal.   Abdominal: Soft.   Musculoskeletal: Normal range of motion.   Severe kyphosis   Neurological: He is alert.   Psychiatric: He has a normal mood and affect.       Assessment:       1. Anemia, unspecified type    2. Venous stasis ulcer of left midfoot with other ulcer severity with varicose veins    3. Congestive heart failure, unspecified HF chronicity, unspecified heart failure type    4. Chronic atrial fibrillation    5. Hypertension, unspecified type         Plan:     Chronic A. Fib  BB  eliquis  Monitoring blood count - GI work up    HTN  Losartan 25 mg    Chronic CHF  BB  Lasix - takes as needed  Weigh ins    Wound care  Saw a vascular MD then went to wound care - at [facility] - advanced wound care  Working with them for the wound    Waiting for home health call  Order placed by wound care.    Anemia  Declines GI work up.  Declines Heme eval  Will trend to ensure not dropping  Cbc in roughly 2 weeks  - he is agreeable to that.

## 2020-02-28 ENCOUNTER — LAB VISIT (OUTPATIENT)
Dept: LAB | Facility: HOSPITAL | Age: 80
End: 2020-02-28
Attending: FAMILY MEDICINE
Payer: MEDICARE

## 2020-02-28 DIAGNOSIS — Z00.00 ANNUAL PHYSICAL EXAM: ICD-10-CM

## 2020-02-28 DIAGNOSIS — N18.9 CHRONIC KIDNEY DISEASE, UNSPECIFIED CKD STAGE: ICD-10-CM

## 2020-02-28 DIAGNOSIS — D64.9 ANEMIA, UNSPECIFIED TYPE: ICD-10-CM

## 2020-02-28 DIAGNOSIS — Z79.899 ENCOUNTER FOR LONG-TERM (CURRENT) USE OF MEDICATIONS: ICD-10-CM

## 2020-02-28 LAB
BASOPHILS # BLD AUTO: 0.04 K/UL (ref 0–0.2)
BASOPHILS NFR BLD: 0.7 % (ref 0–1.9)
CHOLEST SERPL-MCNC: 169 MG/DL (ref 120–199)
CHOLEST/HDLC SERPL: 3.9 {RATIO} (ref 2–5)
DIFFERENTIAL METHOD: ABNORMAL
EOSINOPHIL # BLD AUTO: 0.1 K/UL (ref 0–0.5)
EOSINOPHIL NFR BLD: 1.8 % (ref 0–8)
ERYTHROCYTE [DISTWIDTH] IN BLOOD BY AUTOMATED COUNT: 14.8 % (ref 11.5–14.5)
HCT VFR BLD AUTO: 31.5 % (ref 40–54)
HDLC SERPL-MCNC: 43 MG/DL (ref 40–75)
HDLC SERPL: 25.4 % (ref 20–50)
HGB BLD-MCNC: 9.3 G/DL (ref 14–18)
IMM GRANULOCYTES # BLD AUTO: 0.03 K/UL (ref 0–0.04)
IMM GRANULOCYTES NFR BLD AUTO: 0.5 % (ref 0–0.5)
LDLC SERPL CALC-MCNC: 107.4 MG/DL (ref 63–159)
LYMPHOCYTES # BLD AUTO: 0.9 K/UL (ref 1–4.8)
LYMPHOCYTES NFR BLD: 15.7 % (ref 18–48)
MCH RBC QN AUTO: 29.6 PG (ref 27–31)
MCHC RBC AUTO-ENTMCNC: 29.5 G/DL (ref 32–36)
MCV RBC AUTO: 100 FL (ref 82–98)
MONOCYTES # BLD AUTO: 0.8 K/UL (ref 0.3–1)
MONOCYTES NFR BLD: 14.1 % (ref 4–15)
NEUTROPHILS # BLD AUTO: 3.7 K/UL (ref 1.8–7.7)
NEUTROPHILS NFR BLD: 67.2 % (ref 38–73)
NONHDLC SERPL-MCNC: 126 MG/DL
NRBC BLD-RTO: 0 /100 WBC
PLATELET # BLD AUTO: 147 K/UL (ref 150–350)
PMV BLD AUTO: 10.8 FL (ref 9.2–12.9)
RBC # BLD AUTO: 3.14 M/UL (ref 4.6–6.2)
TRIGL SERPL-MCNC: 93 MG/DL (ref 30–150)
WBC # BLD AUTO: 5.54 K/UL (ref 3.9–12.7)

## 2020-02-28 PROCEDURE — 85025 COMPLETE CBC W/AUTO DIFF WBC: CPT | Mod: HCNC

## 2020-02-28 PROCEDURE — 36415 COLL VENOUS BLD VENIPUNCTURE: CPT | Mod: HCNC

## 2020-02-28 PROCEDURE — 80061 LIPID PANEL: CPT | Mod: HCNC

## 2020-03-04 ENCOUNTER — TELEPHONE (OUTPATIENT)
Dept: INTERNAL MEDICINE | Facility: CLINIC | Age: 80
End: 2020-03-04

## 2020-03-04 NOTE — TELEPHONE ENCOUNTER
----- Message from Brenden Issa MD sent at 3/4/2020 11:37 AM CST -----  Please call the patient regarding his lab  Anemia stable, but still present  Will discuss options at our f/u visit

## 2020-03-16 ENCOUNTER — PATIENT OUTREACH (OUTPATIENT)
Dept: INTERNAL MEDICINE | Facility: CLINIC | Age: 80
End: 2020-03-16

## 2020-03-16 NOTE — PROGRESS NOTES
Call to explain screening process for appointment on tomorrow to patient's caregiver, Chikis. Patient's caregiver in agreement and vocalize understanding.

## 2020-03-17 DIAGNOSIS — D64.9 ANEMIA, UNSPECIFIED TYPE: Primary | ICD-10-CM

## 2020-03-27 DIAGNOSIS — I48.20 CHRONIC ATRIAL FIBRILLATION: ICD-10-CM

## 2020-03-27 RX ORDER — METOPROLOL TARTRATE 100 MG/1
100 TABLET ORAL 2 TIMES DAILY
Qty: 60 TABLET | Refills: 11 | Status: SHIPPED | OUTPATIENT
Start: 2020-03-27 | End: 2020-08-18 | Stop reason: SDUPTHER

## 2020-03-30 RX ORDER — APIXABAN 2.5 MG/1
TABLET, FILM COATED ORAL
Qty: 60 TABLET | Refills: 11 | Status: SHIPPED | OUTPATIENT
Start: 2020-03-30 | End: 2020-08-18 | Stop reason: SDUPTHER

## 2020-05-11 ENCOUNTER — TELEPHONE (OUTPATIENT)
Dept: INTERNAL MEDICINE | Facility: CLINIC | Age: 80
End: 2020-05-11

## 2020-05-11 ENCOUNTER — OFFICE VISIT (OUTPATIENT)
Dept: INTERNAL MEDICINE | Facility: CLINIC | Age: 80
End: 2020-05-11
Payer: MEDICARE

## 2020-05-11 DIAGNOSIS — K59.00 CONSTIPATION, UNSPECIFIED CONSTIPATION TYPE: Primary | ICD-10-CM

## 2020-05-11 DIAGNOSIS — I87.2 VENOUS STASIS DERMATITIS OF BOTH LOWER EXTREMITIES: ICD-10-CM

## 2020-05-11 DIAGNOSIS — I50.22 CHRONIC SYSTOLIC CONGESTIVE HEART FAILURE: ICD-10-CM

## 2020-05-11 PROCEDURE — 99499 UNLISTED E&M SERVICE: CPT | Mod: HCNC,95,, | Performed by: NURSE PRACTITIONER

## 2020-05-11 PROCEDURE — 99499 NO LOS: ICD-10-PCS | Mod: HCNC,95,, | Performed by: NURSE PRACTITIONER

## 2020-05-11 RX ORDER — POLYETHYLENE GLYCOL 3350 17 G/17G
17 POWDER, FOR SOLUTION ORAL DAILY
Qty: 510 G | Refills: 0 | Status: SHIPPED | OUTPATIENT
Start: 2020-05-11 | End: 2020-05-15 | Stop reason: SDUPTHER

## 2020-05-11 RX ORDER — DOXYCYCLINE 100 MG/1
CAPSULE ORAL
COMMUNITY
Start: 2020-05-06 | End: 2020-05-15 | Stop reason: ALTCHOICE

## 2020-05-11 RX ORDER — DOCUSATE SODIUM 100 MG/1
100 CAPSULE, LIQUID FILLED ORAL 2 TIMES DAILY
Qty: 60 CAPSULE | Refills: 0 | Status: SHIPPED | OUTPATIENT
Start: 2020-05-11 | End: 2020-10-15 | Stop reason: CLARIF

## 2020-05-11 NOTE — PROGRESS NOTES
Established Patient - Audio Only Telehealth Visit     The patient location is: home, [place]   The chief complaint leading to consultation is:abdominal pain   Visit type: Virtual visit with audio only (telephone)  Total time spent with patient: 12 minutes        The reason for the audio only service rather than synchronous audio and video virtual visit was related to technical difficulties or patient preference/necessity.     Each patient to whom I provide medical services by telemedicine is:  (1) informed of the relationship between the physician and patient and the respective role of any other health care provider with respect to management of the patient; and (2) notified that they may decline to receive medical services by telemedicine and may withdraw from such care at any time. Patient verbally consented to receive this service via voice-only telephone call.       HPI:    Pt is new to provider, but established in practice and presents with cg Jb) c/o:      Abdominal pain   Started Friday (3 days ago)   Started doxycycline (rx by wound care for foot infection)  Wednesday am    Takes with meals     Pt feels likely gas/constipation   Denies fever/sweat/chills/NV  Sunday started dulcolax (biscodayl)   BMs since, but abdominal pains since waking him in the night   associated with trouble standing up out of bed   Abdomen not tender to touch   lasix only taken on PRN bases   Denies swelling currently     [place]orton have hx of back issues, but denies pain radiation into legs   No numbness/tingling   No urinary complaints     BP not being checked      Assessment and plan:     Constipation   rxs for mirlax and colace sent to [facility]   Educated to notify of new or worsening symptoms     Venous stasis   followed by wound care   instructed to contact them about doxy concerns prior to DC      CHF   No swelling, no SOB - assumed stable        This service was not originating from a related E/M service provided within the previous 7  days nor will  to an E/M service or procedure within the next 24 hours or my soonest available appointment.  Prevailing standard of care was able to be met in this audio-only visit.

## 2020-05-11 NOTE — TELEPHONE ENCOUNTER
----- Message from Kelli Avelar sent at 5/11/2020 10:23 AM CDT -----  Contact: pt  Returning a call.

## 2020-05-11 NOTE — TELEPHONE ENCOUNTER
Spoke with Chikis about the patient pain he is experiencing in his abdomen. She stated that this all started once he started taking Doxycycline on Friday. She gave him something to help with his vowels thinking that was the issue but he still says he's having pain. Chikis would like to know do she bring him in office or the ER. I scheduled her an audio call with the provider.

## 2020-05-13 ENCOUNTER — HOSPITAL ENCOUNTER (EMERGENCY)
Facility: HOSPITAL | Age: 80
Discharge: HOME | End: 2020-05-13
Attending: EMERGENCY MEDICINE
Payer: MEDICARE

## 2020-05-13 VITALS
BODY MASS INDEX: 18.9 KG/M2 | TEMPERATURE: 98 F | WEIGHT: 128 LBS | OXYGEN SATURATION: 100 % | RESPIRATION RATE: 15 BRPM | HEART RATE: 119 BPM | SYSTOLIC BLOOD PRESSURE: 107 MMHG | DIASTOLIC BLOOD PRESSURE: 57 MMHG

## 2020-05-13 DIAGNOSIS — I48.20 CHRONIC ATRIAL FIBRILLATION WITH RAPID VENTRICULAR RESPONSE: ICD-10-CM

## 2020-05-13 DIAGNOSIS — R14.0 ABDOMINAL BLOATING: Primary | ICD-10-CM

## 2020-05-13 DIAGNOSIS — R11.0 NAUSEA: ICD-10-CM

## 2020-05-13 DIAGNOSIS — Z79.01 CHRONIC ANTICOAGULATION: ICD-10-CM

## 2020-05-13 LAB
ALBUMIN SERPL BCP-MCNC: 3.4 G/DL (ref 3.5–5.2)
ALP SERPL-CCNC: 59 U/L (ref 55–135)
ALT SERPL W/O P-5'-P-CCNC: 7 U/L (ref 10–44)
ANION GAP SERPL CALC-SCNC: 12 MMOL/L (ref 8–16)
AST SERPL-CCNC: 16 U/L (ref 10–40)
BASOPHILS # BLD AUTO: 0.07 K/UL (ref 0–0.2)
BASOPHILS NFR BLD: 1 % (ref 0–1.9)
BILIRUB SERPL-MCNC: 0.7 MG/DL (ref 0.1–1)
BILIRUB UR QL STRIP: NEGATIVE
BUN SERPL-MCNC: 35 MG/DL (ref 8–23)
CALCIUM SERPL-MCNC: 9.7 MG/DL (ref 8.7–10.5)
CHLORIDE SERPL-SCNC: 105 MMOL/L (ref 95–110)
CLARITY UR: CLEAR
CO2 SERPL-SCNC: 21 MMOL/L (ref 23–29)
COLOR UR: YELLOW
CREAT SERPL-MCNC: 2.5 MG/DL (ref 0.5–1.4)
DIFFERENTIAL METHOD: ABNORMAL
EOSINOPHIL # BLD AUTO: 0.1 K/UL (ref 0–0.5)
EOSINOPHIL NFR BLD: 0.7 % (ref 0–8)
ERYTHROCYTE [DISTWIDTH] IN BLOOD BY AUTOMATED COUNT: 15.2 % (ref 11.5–14.5)
EST. GFR  (AFRICAN AMERICAN): 27 ML/MIN/1.73 M^2
EST. GFR  (NON AFRICAN AMERICAN): 23 ML/MIN/1.73 M^2
GLUCOSE SERPL-MCNC: 93 MG/DL (ref 70–110)
GLUCOSE UR QL STRIP: NEGATIVE
HCT VFR BLD AUTO: 32.4 % (ref 40–54)
HGB BLD-MCNC: 9.8 G/DL (ref 14–18)
HGB UR QL STRIP: NEGATIVE
IMM GRANULOCYTES # BLD AUTO: 0.06 K/UL (ref 0–0.04)
IMM GRANULOCYTES NFR BLD AUTO: 0.9 % (ref 0–0.5)
INR PPP: 1.3 (ref 0.8–1.2)
KETONES UR QL STRIP: NEGATIVE
LEUKOCYTE ESTERASE UR QL STRIP: NEGATIVE
LIPASE SERPL-CCNC: 25 U/L (ref 4–60)
LYMPHOCYTES # BLD AUTO: 1 K/UL (ref 1–4.8)
LYMPHOCYTES NFR BLD: 14.6 % (ref 18–48)
MCH RBC QN AUTO: 29.9 PG (ref 27–31)
MCHC RBC AUTO-ENTMCNC: 30.2 G/DL (ref 32–36)
MCV RBC AUTO: 99 FL (ref 82–98)
MONOCYTES # BLD AUTO: 0.9 K/UL (ref 0.3–1)
MONOCYTES NFR BLD: 12.4 % (ref 4–15)
NEUTROPHILS # BLD AUTO: 4.9 K/UL (ref 1.8–7.7)
NEUTROPHILS NFR BLD: 70.4 % (ref 38–73)
NITRITE UR QL STRIP: NEGATIVE
NRBC BLD-RTO: 0 /100 WBC
PH UR STRIP: 6 [PH] (ref 5–8)
PLATELET # BLD AUTO: 181 K/UL (ref 150–350)
PMV BLD AUTO: 10.6 FL (ref 9.2–12.9)
POTASSIUM SERPL-SCNC: 5 MMOL/L (ref 3.5–5.1)
PROT SERPL-MCNC: 6.9 G/DL (ref 6–8.4)
PROT UR QL STRIP: NEGATIVE
PROTHROMBIN TIME: 13.6 SEC (ref 9–12.5)
RBC # BLD AUTO: 3.28 M/UL (ref 4.6–6.2)
SODIUM SERPL-SCNC: 138 MMOL/L (ref 136–145)
SP GR UR STRIP: 1.02 (ref 1–1.03)
TROPONIN I SERPL DL<=0.01 NG/ML-MCNC: 0.02 NG/ML (ref 0–0.03)
URN SPEC COLLECT METH UR: NORMAL
UROBILINOGEN UR STRIP-ACNC: NEGATIVE EU/DL
WBC # BLD AUTO: 6.93 K/UL (ref 3.9–12.7)

## 2020-05-13 PROCEDURE — 85025 COMPLETE CBC W/AUTO DIFF WBC: CPT | Mod: HCNC

## 2020-05-13 PROCEDURE — 84484 ASSAY OF TROPONIN QUANT: CPT | Mod: HCNC

## 2020-05-13 PROCEDURE — 81003 URINALYSIS AUTO W/O SCOPE: CPT | Mod: HCNC

## 2020-05-13 PROCEDURE — 25000003 PHARM REV CODE 250: Mod: HCNC | Performed by: EMERGENCY MEDICINE

## 2020-05-13 PROCEDURE — 83690 ASSAY OF LIPASE: CPT | Mod: HCNC

## 2020-05-13 PROCEDURE — 99285 EMERGENCY DEPT VISIT HI MDM: CPT | Mod: 25,HCNC

## 2020-05-13 PROCEDURE — 80053 COMPREHEN METABOLIC PANEL: CPT | Mod: HCNC

## 2020-05-13 PROCEDURE — 93010 ELECTROCARDIOGRAM REPORT: CPT | Mod: HCNC,,, | Performed by: INTERNAL MEDICINE

## 2020-05-13 PROCEDURE — 93005 ELECTROCARDIOGRAM TRACING: CPT | Mod: HCNC

## 2020-05-13 PROCEDURE — 85610 PROTHROMBIN TIME: CPT | Mod: HCNC

## 2020-05-13 PROCEDURE — 96374 THER/PROPH/DIAG INJ IV PUSH: CPT | Mod: HCNC

## 2020-05-13 PROCEDURE — 93010 EKG 12-LEAD: ICD-10-PCS | Mod: HCNC,,, | Performed by: INTERNAL MEDICINE

## 2020-05-13 RX ORDER — METOPROLOL TARTRATE 1 MG/ML
5 INJECTION, SOLUTION INTRAVENOUS
Status: COMPLETED | OUTPATIENT
Start: 2020-05-13 | End: 2020-05-13

## 2020-05-13 RX ADMIN — METOPROLOL TARTRATE 5 MG: 5 INJECTION INTRAVENOUS at 01:05

## 2020-05-13 NOTE — ED PROVIDER NOTES
SCRIBE #1 NOTE: I, Cornelio Cordova, am scribing for, and in the presence of, Antonella Amaya MD. I have scribed the entire note.       History     Chief Complaint   Patient presents with    Abdominal Pain      Wife states pt has abd pain when eating. +Bloating per pt. Pt denies V/D. Wife states pt is having pain before getting out of bed after starting abx and difficutly urinating. pt denies this.      Review of patient's allergies indicates:  No Known Allergies      History of Present Illness     HPI    5/13/2020, 10:36 AM  History obtained from the patient      History of Present Illness: Yan Pickering Jr. is a 80 y.o. male patient with a history of AF, CHF, CKD who presents to the Emergency Department for evaluation of abdominal bloating which onset last night. Symptoms have resolved at this time after taking antacid. No mitigating or exacerbating factors reported. Associated sxs include muscle pain when trying to rise out of bed. Patient denies any fever, chills, n/v/d, abdominal pain, and all other sxs at this time. Prior Tx includes antacid with complete relief. No further complaints or concerns at this time. Patient is on Eliquis for AF. Patient reports compliance with daily medications. Patient referred to ED by [facility] wound care for further evaluation.      Arrival mode: Personal transportation     PCP: Brenden Issa MD      Past Medical History:  Past Medical History:   Diagnosis Date    Atrial fibrillation     Cardiomyopathy 4/26/2017    CHF (congestive heart failure)     CKD (chronic kidney disease) stage 3, GFR 30-59 ml/min     Mild mitral insufficiency     Moderate aortic insufficiency     Venous stasis dermatitis of both lower extremities        Past Surgical History:  History reviewed. No pertinent past surgical history.         Family History:  Family History   Problem Relation Age of Onset    Heart disease Father        Social History:   Social History     Tobacco Use    Smoking  status: Never Smoker    Smokeless tobacco: Never Used   Substance and Sexual Activity    Alcohol use: No    Drug use: No    Sexual activity: Not Currently        Review of Systems     Review of Systems   Constitutional: Negative for chills and fever.   HENT: Negative for sore throat.    Respiratory: Negative for shortness of breath.    Cardiovascular: Negative for chest pain.   Gastrointestinal: Positive for abdominal distention. Negative for abdominal pain, diarrhea, nausea and vomiting.   Genitourinary: Negative for dysuria.   Musculoskeletal: Positive for myalgias. Negative for back pain.   Skin: Negative for rash.   Neurological: Negative for weakness.   Hematological: Does not bruise/bleed easily.   All other systems reviewed and are negative.       Physical Exam     Initial Vitals   BP Pulse Resp Temp SpO2   05/13/20 1019 05/13/20 1019 05/13/20 1019 05/13/20 1021 05/13/20 1019   104/62 110 20 98.2 °F (36.8 °C) 99 %      MAP       --                 Physical Exam  Nursing Notes and Vital Signs Reviewed.  Constitutional: Elderly. Patient is in NAD.  Head: Atraumatic. Normocephalic.  Eyes: PERRL. EOM intact. Conjunctivae are not pale. No scleral icterus.  ENT: Mucous membranes are moist. Oropharynx is clear and symmetric.    Neck: Supple. Full ROM. No lymphadenopathy.  Cardiovascular: Tachycardic. Irregularly irregular rhythm. No murmurs, rubs, or gallops. Distal pulses are 2+ and symmetric.  Pulmonary/Chest: No respiratory distress. Clear to auscultation bilaterally. No wheezing or rales.  Abdominal: Soft and non-distended.  There is no tenderness.  No rebound, guarding, or rigidity. Good bowel sounds.  Genitourinary: No CVA tenderness  Musculoskeletal: Moves all extremities. No obvious deformities. No calf tenderness.  Skin: Warm and dry. Wound dressings noted to bilateral feet with compression stockings in place.  Neurological:  Alert, awake, and appropriate.  Normal speech.  No acute focal neurological  deficits are appreciated.  Psychiatric: Normal affect. Good eye contact. Appropriate in content.     ED Course   Procedures  ED Vital Signs:  Vitals:    05/13/20 1019 05/13/20 1021 05/13/20 1030 05/13/20 1044   BP: 104/62      Pulse: 110  (!) 128    Resp: 20      Temp:  98.2 °F (36.8 °C)     TempSrc: Oral Oral     SpO2: 99%      Weight:    58.1 kg (128 lb)    05/13/20 1102 05/13/20 1131 05/13/20 1302 05/13/20 1305   BP: (!) 118/58 118/73 130/63    Pulse: (!) 114 110 (!) 113 106   Resp:  17 17 17   Temp:       TempSrc:       SpO2: 96% 98% 98% 99%   Weight:           Abnormal Lab Results:  Labs Reviewed   CBC W/ AUTO DIFFERENTIAL - Abnormal; Notable for the following components:       Result Value    RBC 3.28 (*)     Hemoglobin 9.8 (*)     Hematocrit 32.4 (*)     Mean Corpuscular Volume 99 (*)     Mean Corpuscular Hemoglobin Conc 30.2 (*)     RDW 15.2 (*)     Immature Granulocytes 0.9 (*)     Immature Grans (Abs) 0.06 (*)     Lymph% 14.6 (*)     All other components within normal limits   COMPREHENSIVE METABOLIC PANEL - Abnormal; Notable for the following components:    CO2 21 (*)     BUN, Bld 35 (*)     Creatinine 2.5 (*)     Albumin 3.4 (*)     ALT 7 (*)     eGFR if  27 (*)     eGFR if non  23 (*)     All other components within normal limits   PROTIME-INR - Abnormal; Notable for the following components:    Prothrombin Time 13.6 (*)     INR 1.3 (*)     All other components within normal limits   URINALYSIS, REFLEX TO URINE CULTURE    Narrative:     Preferred Collection Type->Urine, Catheterized   PROTIME-INR   APTT   TROPONIN I   LIPASE        All Lab Results:  Results for orders placed or performed during the hospital encounter of 05/13/20   Urinalysis, Reflex to Urine Culture Urine, Catheterized   Result Value Ref Range    Specimen UA Urine, Clean Catch     Color, UA Yellow Yellow, Straw, Vero    Appearance, UA Clear Clear    pH, UA 6.0 5.0 - 8.0    Specific Gravity, UA 1.025  1.005 - 1.030    Protein, UA Negative Negative    Glucose, UA Negative Negative    Ketones, UA Negative Negative    Bilirubin (UA) Negative Negative    Occult Blood UA Negative Negative    Nitrite, UA Negative Negative    Urobilinogen, UA Negative <2.0 EU/dL    Leukocytes, UA Negative Negative   CBC auto differential   Result Value Ref Range    WBC 6.93 3.90 - 12.70 K/uL    RBC 3.28 (L) 4.60 - 6.20 M/uL    Hemoglobin 9.8 (L) 14.0 - 18.0 g/dL    Hematocrit 32.4 (L) 40.0 - 54.0 %    Mean Corpuscular Volume 99 (H) 82 - 98 fL    Mean Corpuscular Hemoglobin 29.9 27.0 - 31.0 pg    Mean Corpuscular Hemoglobin Conc 30.2 (L) 32.0 - 36.0 g/dL    RDW 15.2 (H) 11.5 - 14.5 %    Platelets 181 150 - 350 K/uL    MPV 10.6 9.2 - 12.9 fL    Immature Granulocytes 0.9 (H) 0.0 - 0.5 %    Gran # (ANC) 4.9 1.8 - 7.7 K/uL    Immature Grans (Abs) 0.06 (H) 0.00 - 0.04 K/uL    Lymph # 1.0 1.0 - 4.8 K/uL    Mono # 0.9 0.3 - 1.0 K/uL    Eos # 0.1 0.0 - 0.5 K/uL    Baso # 0.07 0.00 - 0.20 K/uL    nRBC 0 0 /100 WBC    Gran% 70.4 38.0 - 73.0 %    Lymph% 14.6 (L) 18.0 - 48.0 %    Mono% 12.4 4.0 - 15.0 %    Eosinophil% 0.7 0.0 - 8.0 %    Basophil% 1.0 0.0 - 1.9 %    Differential Method Automated    Comprehensive metabolic panel   Result Value Ref Range    Sodium 138 136 - 145 mmol/L    Potassium 5.0 3.5 - 5.1 mmol/L    Chloride 105 95 - 110 mmol/L    CO2 21 (L) 23 - 29 mmol/L    Glucose 93 70 - 110 mg/dL    BUN, Bld 35 (H) 8 - 23 mg/dL    Creatinine 2.5 (H) 0.5 - 1.4 mg/dL    Calcium 9.7 8.7 - 10.5 mg/dL    Total Protein 6.9 6.0 - 8.4 g/dL    Albumin 3.4 (L) 3.5 - 5.2 g/dL    Total Bilirubin 0.7 0.1 - 1.0 mg/dL    Alkaline Phosphatase 59 55 - 135 U/L    AST 16 10 - 40 U/L    ALT 7 (L) 10 - 44 U/L    Anion Gap 12 8 - 16 mmol/L    eGFR if African American 27 (A) >60 mL/min/1.73 m^2    eGFR if non African American 23 (A) >60 mL/min/1.73 m^2   Troponin I   Result Value Ref Range    Troponin I 0.018 0.000 - 0.026 ng/mL   Lipase   Result Value Ref Range     Lipase 25 4 - 60 U/L   Protime-INR   Result Value Ref Range    Prothrombin Time 13.6 (H) 9.0 - 12.5 sec    INR 1.3 (H) 0.8 - 1.2         Imaging Results          X-Ray Abdomen Flat And Erect (Final result)  Result time 05/13/20 11:45:42    Final result by CAROL ANN Sneed Sr., MD (05/13/20 11:45:42)                 Impression:      1. There is a prominent amount of gas within the gastrointestinal system. Individual loops of bowel are not well seen secondary to multiple overlying loops of bowel.  2. There is a prominent amount of fecal material within the colon.  3. There is partial visualization of a radiopaque object projected over the left side of the pelvis.  4. If additional imaging evaluation is clinically indicated, I recommend consideration of a CT examination of the abdomen and pelvis with oral and IV contrast.      Electronically signed by: Terry Sneed MD  Date:    05/13/2020  Time:    11:45             Narrative:    EXAMINATION:  XR ABDOMEN FLAT AND ERECT    CLINICAL HISTORY:  Abdominal distension (gaseous)    COMPARISON:  None    FINDINGS:  There is a prominent amount of gas within the gastrointestinal system.  Individual loops of bowel are not well seen secondary to multiple overlying loops of bowel.  There is a prominent amount of fecal material within the colon.  There is no pneumoperitoneum. The bony structures appear intact.  There is partial visualization of a radiopaque object projected over the left side of the pelvis.                                 The EKG was ordered, reviewed, and independently interpreted by the ED provider.  Interpretation time: 1039  Rate: 107 BPM  Rhythm: AF with RVR  Interpretation: left axis deviation. Nonspecific ST and T wave abnormality, probably digitalis effect. No STEMI.        The Emergency Provider reviewed the vital signs and test results, which are outlined above.     ED Discussion       1:16 PM: Reassessed pt at this time.  Pt states his condition has  improved at this time. Discussed with pt all pertinent ED information and results. Discussed pt dx and plan of tx. Gave pt all f/u and return to the ED instructions. All questions and concerns were addressed at this time. Pt expresses understanding of information and instructions, and is comfortable with plan to discharge. Pt is stable for discharge.    I discussed with patient and/or family/caretaker that evaluation in the ED does not suggest any emergent or life threatening medical conditions requiring immediate intervention beyond what was provided in the ED, and I believe patient is safe for discharge.  Regardless, an unremarkable evaluation in the ED does not preclude the development or presence of a serious of life threatening condition. As such, patient was instructed to return immediately for any worsening or change in current symptoms.       MDM        Medical Decision Making:   Clinical Tests:   Lab Tests: Ordered and Reviewed  Radiological Study: Ordered and Reviewed  Medical Tests: Ordered and Reviewed           ED Medication(s):  Medications   metoprolol injection 5 mg (5 mg Intravenous Given 5/13/20 1305)       New Prescriptions    No medications on file       Follow-up Information     Brenden Issa MD. Schedule an appointment as soon as possible for a visit in 1 day.    Specialty:  Family Medicine  Why:  Return to the Emergency Room, If symptoms worsen  Contact information:  [address] [facility] [id number] [phone]                       Scribe Attestation:   Scribe #1: I performed the above scribed service and the documentation accurately describes the services I performed. I attest to the accuracy of the note.     Attending:   Physician Attestation Statement for Scribe #1: I, Antonella Amaya MD, personally performed the services described in this documentation, as scribed by Cornelio Cordova, in my presence, and it is both accurate and complete.           Clinical  Impression       ICD-10-CM ICD-9-CM   1. Abdominal bloating R14.0 787.3   2. Nausea R11.0 787.02   3. Chronic atrial fibrillation with rapid ventricular response I48.20 427.31   4. Chronic anticoagulation Z79.01 V58.61       Disposition:   Disposition: Discharged  Condition: Stable         Antonella Amaya MD  05/13/20 4849

## 2020-05-14 ENCOUNTER — TELEPHONE (OUTPATIENT)
Dept: INTERNAL MEDICINE | Facility: CLINIC | Age: 80
End: 2020-05-14

## 2020-05-14 NOTE — TELEPHONE ENCOUNTER
----- Message from Sapphire Wilson sent at 5/14/2020 11:07 AM CDT -----  Contact: Patient's friend-Chikis  Please call back concerning patient's appointment on 05/15/2020, and whether or not someone can come inside with patient to assist him.

## 2020-05-14 NOTE — TELEPHONE ENCOUNTER
Spoke with Ms. Diop about the patient she stated patient will not come in for a visit if she can't assist him.

## 2020-05-14 NOTE — TELEPHONE ENCOUNTER
----- Message from Anju Villagran NP sent at 5/14/2020 11:29 AM CDT -----  Contact: Chikis (pt's caregiver)   That's fine with me, he is poor historian and needs assistance with that     ----- Message -----  From: Kem Kay MA  Sent: 5/14/2020  11:22 AM CDT  To: Anju Villagran NP    Please look into chart to see if he will need assists for upcoming appointment.  ----- Message -----  From: Marybeth Garcia  Sent: 5/14/2020  10:53 AM CDT  To: Sparkle Rene Staff    Chikis called and stated she needs to get approval to assist the pt inside the office when he comes for his appt on 5/15/20. She can be reached at [phone] (home)     Thanks,  TF

## 2020-05-15 ENCOUNTER — OFFICE VISIT (OUTPATIENT)
Dept: INTERNAL MEDICINE | Facility: CLINIC | Age: 80
End: 2020-05-15
Payer: MEDICARE

## 2020-05-15 VITALS
DIASTOLIC BLOOD PRESSURE: 68 MMHG | HEIGHT: 69 IN | SYSTOLIC BLOOD PRESSURE: 104 MMHG | OXYGEN SATURATION: 96 % | TEMPERATURE: 98 F | HEART RATE: 88 BPM | BODY MASS INDEX: 18.49 KG/M2 | WEIGHT: 124.88 LBS

## 2020-05-15 DIAGNOSIS — I50.22 CHRONIC SYSTOLIC CONGESTIVE HEART FAILURE: ICD-10-CM

## 2020-05-15 DIAGNOSIS — K59.00 CONSTIPATION, UNSPECIFIED CONSTIPATION TYPE: ICD-10-CM

## 2020-05-15 DIAGNOSIS — I48.20 CHRONIC ATRIAL FIBRILLATION: ICD-10-CM

## 2020-05-15 DIAGNOSIS — Z09 HOSPITAL DISCHARGE FOLLOW-UP: Primary | ICD-10-CM

## 2020-05-15 DIAGNOSIS — R14.0 BLOATING: ICD-10-CM

## 2020-05-15 PROCEDURE — 99213 OFFICE O/P EST LOW 20 MIN: CPT | Mod: HCNC,S$GLB,, | Performed by: NURSE PRACTITIONER

## 2020-05-15 PROCEDURE — 99499 RISK ADDL DX/OHS AUDIT: ICD-10-PCS | Mod: HCNC,S$GLB,, | Performed by: NURSE PRACTITIONER

## 2020-05-15 PROCEDURE — 99499 UNLISTED E&M SERVICE: CPT | Mod: HCNC,S$GLB,, | Performed by: NURSE PRACTITIONER

## 2020-05-15 PROCEDURE — 99999 PR PBB SHADOW E&M-EST. PATIENT-LVL III: ICD-10-PCS | Mod: PBBFAC,HCNC,, | Performed by: NURSE PRACTITIONER

## 2020-05-15 PROCEDURE — 99999 PR PBB SHADOW E&M-EST. PATIENT-LVL III: CPT | Mod: PBBFAC,HCNC,, | Performed by: NURSE PRACTITIONER

## 2020-05-15 PROCEDURE — 99213 PR OFFICE/OUTPT VISIT, EST, LEVL III, 20-29 MIN: ICD-10-PCS | Mod: HCNC,S$GLB,, | Performed by: NURSE PRACTITIONER

## 2020-05-15 RX ORDER — POLYETHYLENE GLYCOL 3350 17 G/17G
17 POWDER, FOR SOLUTION ORAL DAILY
Qty: 510 G | Refills: 0 | Status: SHIPPED | OUTPATIENT
Start: 2020-05-15 | End: 2020-10-15 | Stop reason: CLARIF

## 2020-05-15 NOTE — PROGRESS NOTES
Yan Pickering . 80 y.o. male     Chief Complaint:  Chief Complaint   Patient presents with    Follow-up       History of Present Illness:  Pt is known to provider (via phone only) and presents for:       Hospital fu 5/13 for abdominal pain   Dx - bloating   Taking gas ex     Seen for audio visit on 5/11 prior   instructed on colace and miralax   Never started either   Has since stopped doxy (pt suspected trigger of pain)     Transitional Care Note    Family and/or Caretaker present at visit?  Yes.  Diagnostic tests reviewed/disposition: I have reviewed all completed as well as pending diagnostic tests at the time of discharge.  Disease/illness education: yes  Home health/community services discussion/referrals: Patient does not have home health established from hospital visit.  They do not need home health.  If needed, we will set up home health for the patient.   Establishment or re-establishment of referral orders for community resources: No other necessary community resources.   Discussion with other health care providers: No discussion with other health care providers necessary.     Exam:  Review of Systems   Constitutional: Negative for chills, diaphoresis, fever and unexpected weight change.   HENT: Negative for trouble swallowing.    Eyes: Negative for visual disturbance.   Respiratory: Negative for cough and shortness of breath.    Cardiovascular: Negative for chest pain.   Gastrointestinal: Positive for abdominal pain and constipation. Negative for abdominal distention, blood in stool, diarrhea, nausea, rectal pain and vomiting.   Genitourinary: Negative for difficulty urinating.   Skin: Negative for wound.   Neurological: Negative for speech difficulty.   Psychiatric/Behavioral: Negative for confusion.     Physical Exam   Constitutional: He is oriented to person, place, and time. He appears well-developed and well-nourished. He is cooperative.  Non-toxic appearance. He does not have a sickly  appearance. He does not appear ill. No distress.   HENT:   Head: Normocephalic and atraumatic.   Right Ear: External ear normal.   Left Ear: External ear normal.   Eyes: Pupils are equal, round, and reactive to light. Conjunctivae and EOM are normal. Right eye exhibits no discharge. Left eye exhibits no discharge. No scleral icterus.   Neck: Normal range of motion. No tracheal deviation present.   Cardiovascular: Normal rate and regular rhythm.   Pulmonary/Chest: Effort normal and breath sounds normal. No stridor. No respiratory distress. He has no wheezes. He has no rales. He exhibits no tenderness.   Abdominal: Soft. Bowel sounds are normal. He exhibits no distension and no mass. There is no tenderness. There is no rebound and no guarding. No hernia.   Musculoskeletal: Normal range of motion. He exhibits deformity (severe kyphosis ).   Neurological: He is alert and oriented to person, place, and time.   Skin: Skin is warm, dry and intact. No rash noted. He is not diaphoretic. No erythema. No pallor.   Psychiatric: He has a normal mood and affect. His speech is normal and behavior is normal. Judgment and thought content normal. Cognition and memory are normal.   Forgetful    Nursing note and vitals reviewed.      Most Recent Laboratory Results Reviewed ({Yes)  Lab Results   Component Value Date    WBC 6.93 05/13/2020    HGB 9.8 (L) 05/13/2020    HCT 32.4 (L) 05/13/2020     05/13/2020    CHOL 169 02/28/2020    TRIG 93 02/28/2020    HDL 43 02/28/2020    ALT 7 (L) 05/13/2020    AST 16 05/13/2020     05/13/2020    K 5.0 05/13/2020     05/13/2020    CREATININE 2.5 (H) 05/13/2020    BUN 35 (H) 05/13/2020    CO2 21 (L) 05/13/2020    TSH 0.973 04/15/2013    INR 1.3 (H) 05/13/2020       Assessment     ICD-10-CM ICD-9-CM   1. Hospital discharge follow-up Z09 V67.59   2. Constipation, unspecified constipation type K59.00 564.00   3. Bloating R14.0 787.3   4. Chronic atrial fibrillation I48.20 427.31   5.  Chronic systolic congestive heart failure I50.22 428.22     428.0        Plan   Hospital discharge follow-up    Constipation, unspecified constipation type  -     polyethylene glycol (GLYCOLAX) 17 gram/dose powder; Take 17 g by mouth once daily.  Dispense: 510 g; Refill: 0  Increase your intake of water, fruits and vegetables   Make sure to exercise regularly  Minimize dairy intake     Bloating  Continue gas ex     Chronic atrial fibrillation  Chronic systolic congestive heart failure  Followed by cards   No signs fluid overload        Follow up if symptoms worsen or fail to improve.  Future Appointments     Date Provider Specialty Appt Notes    8/18/2020 [facility] 6 months

## 2020-05-15 NOTE — PATIENT INSTRUCTIONS
You need to be taking:   - gas- ex   - miralax (OTC or the prescription)   - colace (docusate)       For Constipation:   Increase your intake of water, fruits and vegetables   Make sure to exercise regularly    Minimize dairy intake

## 2020-05-18 RX ORDER — SIMETHICONE 125 MG
125 CAPSULE ORAL 4 TIMES DAILY PRN
COMMUNITY
End: 2020-10-23

## 2020-05-25 ENCOUNTER — TELEPHONE (OUTPATIENT)
Dept: INTERNAL MEDICINE | Facility: CLINIC | Age: 80
End: 2020-05-25

## 2020-05-25 DIAGNOSIS — R10.84 GENERALIZED ABDOMINAL PAIN: ICD-10-CM

## 2020-05-25 NOTE — TELEPHONE ENCOUNTER
Called and spoke with patient. He hasn't been able to get out of the bed. He has been having this pain since he was last seen. The ER said that its gas. That isn't the problem he has been having good BM. He is in pain and needs some relief. He is asking if a muscle relaxer. Doanes she said possible for back pain she said its OTC.

## 2020-05-25 NOTE — TELEPHONE ENCOUNTER
----- Message from Angeles Hudson sent at 5/25/2020  8:19 AM CDT -----  Contact: pt  Type:  Needs Medical Advice    Who Called: the pt  Symptoms (please be specific): back pain   How long has patient had these symptoms: last week   Pharmacy name and phone #:  [facility]  Would the patient rather a call back or a response via [facility]? Call back  Best Call Back Number: [phone]  Additional Information: The pt wants a med called into his pharmacy.

## 2020-05-26 ENCOUNTER — HOSPITAL ENCOUNTER (OUTPATIENT)
Dept: RADIOLOGY | Facility: HOSPITAL | Age: 80
Discharge: HOME | End: 2020-05-26
Attending: NURSE PRACTITIONER
Payer: MEDICARE

## 2020-05-26 DIAGNOSIS — R10.84 GENERALIZED ABDOMINAL PAIN: ICD-10-CM

## 2020-05-26 PROCEDURE — 74176 CT ABD & PELVIS W/O CONTRAST: CPT | Mod: TC,HCNC

## 2020-05-26 PROCEDURE — 25500020 PHARM REV CODE 255: Mod: HCNC | Performed by: NURSE PRACTITIONER

## 2020-05-26 RX ADMIN — IOHEXOL 30 ML: 350 INJECTION, SOLUTION INTRAVENOUS at 05:05

## 2020-08-17 ENCOUNTER — PATIENT OUTREACH (OUTPATIENT)
Dept: ADMINISTRATIVE | Facility: OTHER | Age: 80
End: 2020-08-17

## 2020-08-17 NOTE — PROGRESS NOTES
Chart reviewed.   Immunizations: Triggered Imm Registry     Orders placed: n/a  Upcoming appts to satisfy LUPILLO topics: n/a

## 2020-08-18 ENCOUNTER — OFFICE VISIT (OUTPATIENT)
Dept: CARDIOLOGY | Facility: CLINIC | Age: 80
End: 2020-08-18
Payer: MEDICARE

## 2020-08-18 VITALS
WEIGHT: 122.38 LBS | DIASTOLIC BLOOD PRESSURE: 60 MMHG | HEART RATE: 90 BPM | BODY MASS INDEX: 18.13 KG/M2 | HEIGHT: 69 IN | SYSTOLIC BLOOD PRESSURE: 90 MMHG

## 2020-08-18 DIAGNOSIS — I48.20 CHRONIC ATRIAL FIBRILLATION: Primary | ICD-10-CM

## 2020-08-18 DIAGNOSIS — I50.22 CHRONIC SYSTOLIC CONGESTIVE HEART FAILURE: ICD-10-CM

## 2020-08-18 PROCEDURE — 99499 RISK ADDL DX/OHS AUDIT: ICD-10-PCS | Mod: HCNC,S$GLB,, | Performed by: NURSE PRACTITIONER

## 2020-08-18 PROCEDURE — 99999 PR PBB SHADOW E&M-EST. PATIENT-LVL III: CPT | Mod: PBBFAC,HCNC,, | Performed by: NURSE PRACTITIONER

## 2020-08-18 PROCEDURE — 3074F PR MOST RECENT SYSTOLIC BLOOD PRESSURE < 130 MM HG: ICD-10-PCS | Mod: HCNC,CPTII,S$GLB, | Performed by: NURSE PRACTITIONER

## 2020-08-18 PROCEDURE — 1101F PR PT FALLS ASSESS DOC 0-1 FALLS W/OUT INJ PAST YR: ICD-10-PCS | Mod: HCNC,CPTII,S$GLB, | Performed by: NURSE PRACTITIONER

## 2020-08-18 PROCEDURE — 1101F PT FALLS ASSESS-DOCD LE1/YR: CPT | Mod: HCNC,CPTII,S$GLB, | Performed by: NURSE PRACTITIONER

## 2020-08-18 PROCEDURE — 3074F SYST BP LT 130 MM HG: CPT | Mod: HCNC,CPTII,S$GLB, | Performed by: NURSE PRACTITIONER

## 2020-08-18 PROCEDURE — 99499 UNLISTED E&M SERVICE: CPT | Mod: HCNC,S$GLB,, | Performed by: NURSE PRACTITIONER

## 2020-08-18 PROCEDURE — 1159F PR MEDICATION LIST DOCUMENTED IN MEDICAL RECORD: ICD-10-PCS | Mod: HCNC,S$GLB,, | Performed by: NURSE PRACTITIONER

## 2020-08-18 PROCEDURE — 1159F MED LIST DOCD IN RCRD: CPT | Mod: HCNC,S$GLB,, | Performed by: NURSE PRACTITIONER

## 2020-08-18 PROCEDURE — 1126F AMNT PAIN NOTED NONE PRSNT: CPT | Mod: HCNC,S$GLB,, | Performed by: NURSE PRACTITIONER

## 2020-08-18 PROCEDURE — 1126F PR PAIN SEVERITY QUANTIFIED, NO PAIN PRESENT: ICD-10-PCS | Mod: HCNC,S$GLB,, | Performed by: NURSE PRACTITIONER

## 2020-08-18 PROCEDURE — 99214 PR OFFICE/OUTPT VISIT, EST, LEVL IV, 30-39 MIN: ICD-10-PCS | Mod: HCNC,S$GLB,, | Performed by: NURSE PRACTITIONER

## 2020-08-18 PROCEDURE — 3078F PR MOST RECENT DIASTOLIC BLOOD PRESSURE < 80 MM HG: ICD-10-PCS | Mod: HCNC,CPTII,S$GLB, | Performed by: NURSE PRACTITIONER

## 2020-08-18 PROCEDURE — 99214 OFFICE O/P EST MOD 30 MIN: CPT | Mod: HCNC,S$GLB,, | Performed by: NURSE PRACTITIONER

## 2020-08-18 PROCEDURE — 99999 PR PBB SHADOW E&M-EST. PATIENT-LVL III: ICD-10-PCS | Mod: PBBFAC,HCNC,, | Performed by: NURSE PRACTITIONER

## 2020-08-18 PROCEDURE — 3078F DIAST BP <80 MM HG: CPT | Mod: HCNC,CPTII,S$GLB, | Performed by: NURSE PRACTITIONER

## 2020-08-18 RX ORDER — FUROSEMIDE 40 MG/1
40 TABLET ORAL DAILY PRN
Qty: 15 TABLET | Refills: 11 | Status: SHIPPED | OUTPATIENT
Start: 2020-08-18 | End: 2020-11-05

## 2020-08-18 RX ORDER — METOPROLOL TARTRATE 100 MG/1
100 TABLET ORAL 2 TIMES DAILY
Qty: 60 TABLET | Refills: 11 | Status: SHIPPED | OUTPATIENT
Start: 2020-08-18 | End: 2020-12-22 | Stop reason: SDUPTHER

## 2020-08-18 RX ORDER — LOSARTAN POTASSIUM 25 MG/1
25 TABLET ORAL DAILY
Qty: 30 TABLET | Refills: 11 | Status: SHIPPED | OUTPATIENT
Start: 2020-08-18 | End: 2020-10-23

## 2020-08-18 NOTE — PROGRESS NOTES
Subjective:   Patient ID:  Yan Pickering Jr. is a 80 y.o. male who presents for follow up of Atrial Fibrillation      HPI  Mr. 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   Echo in Jan 2020 showed LVEF 45%, DD  Required transfusion in Jan 2020 for bleeding varicose vein to left foot.   Has no recurrent bleeding issues.   Denies any chest pain, SOB, LUNA,  orthopnea, PND, dizziness, palpitations,  near syncope, syncope or edema . Has no symptoms concerning for angina or equivalent. No CNS Complaints to suggest TIA or CVA. Does well with limiting sodium intake.      Past Medical History:   Diagnosis Date    Atrial fibrillation     Cardiomyopathy 4/26/2017    CHF (congestive heart failure)     CKD (chronic kidney disease) stage 3, GFR 30-59 ml/min     Mild mitral insufficiency     Moderate aortic insufficiency     Venous stasis dermatitis of both lower extremities        No past surgical history on file.    Social History     Tobacco Use    Smoking status: Never Smoker    Smokeless tobacco: Never Used   Substance Use Topics    Alcohol use: No    Drug use: No       Family History   Problem Relation Age of Onset    Heart disease Father        Current Outpatient Medications   Medication Sig    apixaban (ELIQUIS) 2.5 mg Tab Take 1 tablet (2.5 mg total) by mouth 2 (two) times daily.    furosemide (LASIX) 40 MG tablet Take 1 tablet (40 mg total) by mouth daily as needed (swelling).    losartan (COZAAR) 25 MG tablet Take 1 tablet (25 mg total) by mouth once daily.    metoprolol tartrate (LOPRESSOR) 100 MG tablet Take 1 tablet (100 mg total) by mouth 2 (two) times daily.    polyethylene glycol (GLYCOLAX) 17 gram/dose powder Take 17 g by mouth once daily.    simethicone (MYLICON) 125 mg Cap capsule Take 125 mg by mouth 4 (four) times daily as needed.     docusate sodium (COLACE) 100 MG capsule Take 1 capsule (100 mg total) by mouth 2 (two) times daily.     No current facility-administered medications for this visit.      Current Outpatient Medications on File Prior to Visit   Medication Sig    polyethylene glycol (GLYCOLAX) 17 gram/dose powder Take 17 g by mouth once daily.    simethicone (MYLICON) 125 mg Cap capsule Take 125 mg by mouth 4 (four) times daily as needed.    [DISCONTINUED] ELIQUIS 2.5 mg Tab Take 1 tablet by mouth twice daily    [DISCONTINUED] furosemide (LASIX) 40 MG tablet Take 1 tablet (40 mg total) by mouth once daily.    [DISCONTINUED] losartan (COZAAR) 25 MG tablet Take 1 tablet (25 mg total) by mouth once daily.    [DISCONTINUED] metoprolol tartrate (LOPRESSOR) 100 MG tablet Take 1 tablet (100 mg total) by mouth 2 (two) times daily.    docusate sodium (COLACE) 100 MG capsule Take 1 capsule (100 mg total) by mouth 2 (two) times daily.     No current facility-administered medications on file prior to visit.        Review of Systems   Constitution: Negative for diaphoresis, malaise/fatigue, weight gain and weight loss.   HENT: Negative for congestion and nosebleeds.    Cardiovascular: Negative for chest pain, claudication, cyanosis, dyspnea on exertion, irregular heartbeat, leg swelling, near-syncope, orthopnea, palpitations, paroxysmal nocturnal dyspnea and syncope.   Respiratory: Negative for cough, hemoptysis, shortness of breath, sleep disturbances due to breathing, snoring, sputum production and wheezing.    Hematologic/Lymphatic: Negative for bleeding problem. Does not bruise/bleed easily.   Skin: Negative for rash.   Musculoskeletal: Negative for arthritis, back pain, falls, joint pain, muscle cramps and muscle weakness.   Gastrointestinal: Negative for abdominal pain, constipation, diarrhea, heartburn, hematemesis, hematochezia, melena, nausea and vomiting.   Genitourinary: Negative for dysuria, hematuria and nocturia.  "  Neurological: Negative for excessive daytime sleepiness, dizziness, headaches, light-headedness, loss of balance, numbness, vertigo and weakness.       Objective:   Physical Exam   Constitutional: He is oriented to person, place, and time. He appears well-developed and well-nourished.   Neck: Neck supple. No JVD present.   Cardiovascular: Normal rate, normal heart sounds and normal pulses. An irregularly irregular rhythm present. Exam reveals no friction rub.   No murmur heard.  Pulmonary/Chest: Effort normal and breath sounds normal. No respiratory distress. He has no wheezes. He has no rales.   Abdominal: Soft. Bowel sounds are normal. He exhibits no distension.   Musculoskeletal:         General: No tenderness or edema.      Comments: Significant kyphosis    Neurological: He is alert and oriented to person, place, and time.   Skin: Skin is warm and dry. No rash noted.   Spider veins to BLE   Varicose veins    Psychiatric: He has a normal mood and affect. His behavior is normal.   Nursing note and vitals reviewed.    Vitals:    08/18/20 1311   BP: 90/60   Pulse: 90   Weight: 55.5 kg (122 lb 5.7 oz)   Height: 5' 9" (1.753 m)     Lab Results   Component Value Date    CHOL 169 02/28/2020     Lab Results   Component Value Date    HDL 43 02/28/2020     Lab Results   Component Value Date    LDLCALC 107.4 02/28/2020     Lab Results   Component Value Date    TRIG 93 02/28/2020     Lab Results   Component Value Date    CHOLHDL 25.4 02/28/2020       Chemistry        Component Value Date/Time     05/13/2020 1156    K 5.0 05/13/2020 1156     05/13/2020 1156    CO2 21 (L) 05/13/2020 1156    BUN 35 (H) 05/13/2020 1156    CREATININE 2.5 (H) 05/13/2020 1156    GLU 93 05/13/2020 1156        Component Value Date/Time    CALCIUM 9.7 05/13/2020 1156    ALKPHOS 59 05/13/2020 1156    AST 16 05/13/2020 1156    ALT 7 (L) 05/13/2020 1156    BILITOT 0.7 05/13/2020 1156    ESTGFRAFRICA 27 (A) 05/13/2020 1156    EGFRNONAA 23 " (A) 05/13/2020 1156          Lab Results   Component Value Date    TSH 0.973 04/15/2013     Lab Results   Component Value Date    INR 1.3 (H) 05/13/2020    INR 1.1 01/23/2020    INR 1.2 03/16/2017     Lab Results   Component Value Date    WBC 6.93 05/13/2020    HGB 9.8 (L) 05/13/2020    HCT 32.4 (L) 05/13/2020    MCV 99 (H) 05/13/2020     05/13/2020     BMP  Sodium   Date Value Ref Range Status   05/13/2020 138 136 - 145 mmol/L Final     Potassium   Date Value Ref Range Status   05/13/2020 5.0 3.5 - 5.1 mmol/L Final     Chloride   Date Value Ref Range Status   05/13/2020 105 95 - 110 mmol/L Final     CO2   Date Value Ref Range Status   05/13/2020 21 (L) 23 - 29 mmol/L Final     BUN, Bld   Date Value Ref Range Status   05/13/2020 35 (H) 8 - 23 mg/dL Final     Creatinine   Date Value Ref Range Status   05/13/2020 2.5 (H) 0.5 - 1.4 mg/dL Final     Calcium   Date Value Ref Range Status   05/13/2020 9.7 8.7 - 10.5 mg/dL Final     Anion Gap   Date Value Ref Range Status   05/13/2020 12 8 - 16 mmol/L Final     eGFR if    Date Value Ref Range Status   05/13/2020 27 (A) >60 mL/min/1.73 m^2 Final     eGFR if non    Date Value Ref Range Status   05/13/2020 23 (A) >60 mL/min/1.73 m^2 Final     Comment:     Calculation used to obtain the estimated glomerular filtration  rate (eGFR) is the CKD-EPI equation.        CrCl cannot be calculated (Patient's most recent lab result is older than the maximum 7 days allowed.).    Assessment:     1. Chronic atrial fibrillation    2. Chronic systolic congestive heart failure        Plan:   Chronic atrial fibrillation  Continue metoprolol as prescribed   Eliquis for CVA prophylaxis     Chronic systolic congestive heart failure  Continue losartan and metoprolol.   Lasix PRN    Heart healthy diet  Limit fluid intake 50-60 oz   Daily weights and to notify clinic if weight increases by more than 3 lbs in 1 day or 5 lbs in 1 week.   Exercise routine as  tolerated    RTC in 1 year or sooner if needed

## 2020-10-15 ENCOUNTER — HOSPITAL ENCOUNTER (INPATIENT)
Facility: HOSPITAL | Age: 80
LOS: 2 days | Discharge: HOME | DRG: 292 | End: 2020-10-17
Attending: EMERGENCY MEDICINE | Admitting: FAMILY MEDICINE
Payer: MEDICARE

## 2020-10-15 DIAGNOSIS — R10.9 ABDOMINAL PAIN: ICD-10-CM

## 2020-10-15 DIAGNOSIS — I50.43 ACUTE ON CHRONIC COMBINED SYSTOLIC AND DIASTOLIC CHF (CONGESTIVE HEART FAILURE): ICD-10-CM

## 2020-10-15 DIAGNOSIS — R33.9 URINARY RETENTION: Primary | ICD-10-CM

## 2020-10-15 DIAGNOSIS — R10.9 ABDOMINAL PAIN, UNSPECIFIED ABDOMINAL LOCATION: ICD-10-CM

## 2020-10-15 DIAGNOSIS — N18.4 CKD (CHRONIC KIDNEY DISEASE) STAGE 4, GFR 15-29 ML/MIN: ICD-10-CM

## 2020-10-15 DIAGNOSIS — I50.9 CHF (CONGESTIVE HEART FAILURE): ICD-10-CM

## 2020-10-15 DIAGNOSIS — M79.89 LEG SWELLING: ICD-10-CM

## 2020-10-15 PROBLEM — K40.20 BILATERAL INGUINAL HERNIA WITHOUT OBSTRUCTION OR GANGRENE: Status: ACTIVE | Noted: 2020-10-15

## 2020-10-15 LAB
ALBUMIN SERPL BCP-MCNC: 2.8 G/DL (ref 3.5–5.2)
ALP SERPL-CCNC: 71 U/L (ref 55–135)
ALT SERPL W/O P-5'-P-CCNC: 10 U/L (ref 10–44)
ANION GAP SERPL CALC-SCNC: 11 MMOL/L (ref 8–16)
APTT BLDCRRT: 38.8 SEC (ref 21–32)
AST SERPL-CCNC: 17 U/L (ref 10–40)
BASOPHILS # BLD AUTO: 0.03 K/UL (ref 0–0.2)
BASOPHILS NFR BLD: 0.2 % (ref 0–1.9)
BILIRUB SERPL-MCNC: 1.1 MG/DL (ref 0.1–1)
BILIRUB UR QL STRIP: NEGATIVE
BNP SERPL-MCNC: 1579 PG/ML (ref 0–99)
BUN SERPL-MCNC: 56 MG/DL (ref 8–23)
CALCIUM SERPL-MCNC: 8.8 MG/DL (ref 8.7–10.5)
CHLORIDE SERPL-SCNC: 102 MMOL/L (ref 95–110)
CLARITY UR: CLEAR
CO2 SERPL-SCNC: 24 MMOL/L (ref 23–29)
COLOR UR: YELLOW
CREAT SERPL-MCNC: 2.6 MG/DL (ref 0.5–1.4)
DIFFERENTIAL METHOD: ABNORMAL
EOSINOPHIL # BLD AUTO: 0 K/UL (ref 0–0.5)
EOSINOPHIL NFR BLD: 0.2 % (ref 0–8)
ERYTHROCYTE [DISTWIDTH] IN BLOOD BY AUTOMATED COUNT: 16.1 % (ref 11.5–14.5)
EST. GFR  (AFRICAN AMERICAN): 25 ML/MIN/1.73 M^2
EST. GFR  (NON AFRICAN AMERICAN): 22 ML/MIN/1.73 M^2
GLUCOSE SERPL-MCNC: 106 MG/DL (ref 70–110)
GLUCOSE UR QL STRIP: NEGATIVE
HCT VFR BLD AUTO: 27.5 % (ref 40–54)
HGB BLD-MCNC: 8.4 G/DL (ref 14–18)
HGB UR QL STRIP: NEGATIVE
IMM GRANULOCYTES # BLD AUTO: 0.13 K/UL (ref 0–0.04)
IMM GRANULOCYTES NFR BLD AUTO: 1.1 % (ref 0–0.5)
INR PPP: 1.2 (ref 0.8–1.2)
KETONES UR QL STRIP: NEGATIVE
LACTATE SERPL-SCNC: 1 MMOL/L (ref 0.5–2.2)
LEUKOCYTE ESTERASE UR QL STRIP: NEGATIVE
LYMPHOCYTES # BLD AUTO: 0.5 K/UL (ref 1–4.8)
LYMPHOCYTES NFR BLD: 4.4 % (ref 18–48)
MAGNESIUM SERPL-MCNC: 2.1 MG/DL (ref 1.6–2.6)
MCH RBC QN AUTO: 30 PG (ref 27–31)
MCHC RBC AUTO-ENTMCNC: 30.5 G/DL (ref 32–36)
MCV RBC AUTO: 98 FL (ref 82–98)
MONOCYTES # BLD AUTO: 1.3 K/UL (ref 0.3–1)
MONOCYTES NFR BLD: 10.9 % (ref 4–15)
NEUTROPHILS # BLD AUTO: 10.1 K/UL (ref 1.8–7.7)
NEUTROPHILS NFR BLD: 83.2 % (ref 38–73)
NITRITE UR QL STRIP: NEGATIVE
NRBC BLD-RTO: 0 /100 WBC
PH UR STRIP: 6 [PH] (ref 5–8)
PLATELET # BLD AUTO: 143 K/UL (ref 150–350)
PMV BLD AUTO: 10.1 FL (ref 9.2–12.9)
POTASSIUM SERPL-SCNC: 4.9 MMOL/L (ref 3.5–5.1)
PROCALCITONIN SERPL IA-MCNC: 0.39 NG/ML
PROT SERPL-MCNC: 6.6 G/DL (ref 6–8.4)
PROT UR QL STRIP: NEGATIVE
PROTHROMBIN TIME: 13.1 SEC (ref 9–12.5)
RBC # BLD AUTO: 2.8 M/UL (ref 4.6–6.2)
SARS-COV-2 RDRP RESP QL NAA+PROBE: NEGATIVE
SODIUM SERPL-SCNC: 137 MMOL/L (ref 136–145)
SP GR UR STRIP: 1.02 (ref 1–1.03)
TROPONIN I SERPL DL<=0.01 NG/ML-MCNC: 0.01 NG/ML (ref 0–0.03)
URN SPEC COLLECT METH UR: NORMAL
UROBILINOGEN UR STRIP-ACNC: NEGATIVE EU/DL
WBC # BLD AUTO: 12.16 K/UL (ref 3.9–12.7)

## 2020-10-15 PROCEDURE — G0378 HOSPITAL OBSERVATION PER HR: HCPCS | Mod: HCNC

## 2020-10-15 PROCEDURE — 63600175 PHARM REV CODE 636 W HCPCS: Mod: HCNC | Performed by: NURSE PRACTITIONER

## 2020-10-15 PROCEDURE — 85025 COMPLETE CBC W/AUTO DIFF WBC: CPT | Mod: HCNC

## 2020-10-15 PROCEDURE — 99222 1ST HOSP IP/OBS MODERATE 55: CPT | Mod: HCNC,,, | Performed by: UROLOGY

## 2020-10-15 PROCEDURE — 21400001 HC TELEMETRY ROOM: Mod: HCNC

## 2020-10-15 PROCEDURE — 25000003 PHARM REV CODE 250: Mod: HCNC | Performed by: NURSE PRACTITIONER

## 2020-10-15 PROCEDURE — 83880 ASSAY OF NATRIURETIC PEPTIDE: CPT | Mod: HCNC

## 2020-10-15 PROCEDURE — 93010 ELECTROCARDIOGRAM REPORT: CPT | Mod: HCNC,,, | Performed by: INTERNAL MEDICINE

## 2020-10-15 PROCEDURE — 93005 ELECTROCARDIOGRAM TRACING: CPT | Mod: HCNC

## 2020-10-15 PROCEDURE — 63600175 PHARM REV CODE 636 W HCPCS: Mod: HCNC | Performed by: EMERGENCY MEDICINE

## 2020-10-15 PROCEDURE — 99285 EMERGENCY DEPT VISIT HI MDM: CPT | Mod: 25,HCNC

## 2020-10-15 PROCEDURE — U0002 COVID-19 LAB TEST NON-CDC: HCPCS | Mod: HCNC

## 2020-10-15 PROCEDURE — 99222 PR INITIAL HOSPITAL CARE,LEVL II: ICD-10-PCS | Mod: HCNC,,, | Performed by: UROLOGY

## 2020-10-15 PROCEDURE — 99222 PR INITIAL HOSPITAL CARE,LEVL II: ICD-10-PCS | Mod: HCNC,,, | Performed by: SURGERY

## 2020-10-15 PROCEDURE — 36415 COLL VENOUS BLD VENIPUNCTURE: CPT | Mod: HCNC

## 2020-10-15 PROCEDURE — 25000003 PHARM REV CODE 250: Mod: HCNC | Performed by: EMERGENCY MEDICINE

## 2020-10-15 PROCEDURE — 96367 TX/PROPH/DG ADDL SEQ IV INF: CPT | Mod: HCNC

## 2020-10-15 PROCEDURE — 83735 ASSAY OF MAGNESIUM: CPT | Mod: HCNC

## 2020-10-15 PROCEDURE — 83605 ASSAY OF LACTIC ACID: CPT | Mod: HCNC

## 2020-10-15 PROCEDURE — 96375 TX/PRO/DX INJ NEW DRUG ADDON: CPT

## 2020-10-15 PROCEDURE — 80053 COMPREHEN METABOLIC PANEL: CPT | Mod: HCNC

## 2020-10-15 PROCEDURE — 96361 HYDRATE IV INFUSION ADD-ON: CPT | Mod: HCNC

## 2020-10-15 PROCEDURE — 87040 BLOOD CULTURE FOR BACTERIA: CPT | Mod: HCNC

## 2020-10-15 PROCEDURE — 81003 URINALYSIS AUTO W/O SCOPE: CPT | Mod: HCNC

## 2020-10-15 PROCEDURE — 84484 ASSAY OF TROPONIN QUANT: CPT | Mod: HCNC

## 2020-10-15 PROCEDURE — 99222 1ST HOSP IP/OBS MODERATE 55: CPT | Mod: HCNC,,, | Performed by: SURGERY

## 2020-10-15 PROCEDURE — 84145 PROCALCITONIN (PCT): CPT | Mod: HCNC

## 2020-10-15 PROCEDURE — 85610 PROTHROMBIN TIME: CPT | Mod: HCNC

## 2020-10-15 PROCEDURE — 85730 THROMBOPLASTIN TIME PARTIAL: CPT | Mod: HCNC

## 2020-10-15 PROCEDURE — 96365 THER/PROPH/DIAG IV INF INIT: CPT | Mod: HCNC

## 2020-10-15 PROCEDURE — 96366 THER/PROPH/DIAG IV INF ADDON: CPT

## 2020-10-15 PROCEDURE — 93010 EKG 12-LEAD: ICD-10-PCS | Mod: HCNC,,, | Performed by: INTERNAL MEDICINE

## 2020-10-15 RX ORDER — LOSARTAN POTASSIUM 25 MG/1
25 TABLET ORAL DAILY
Status: DISCONTINUED | OUTPATIENT
Start: 2020-10-16 | End: 2020-10-17 | Stop reason: HOSPADM

## 2020-10-15 RX ORDER — IBUPROFEN 200 MG
16 TABLET ORAL
Status: DISCONTINUED | OUTPATIENT
Start: 2020-10-15 | End: 2020-10-17 | Stop reason: HOSPADM

## 2020-10-15 RX ORDER — SODIUM CHLORIDE 0.9 % (FLUSH) 0.9 %
10 SYRINGE (ML) INJECTION
Status: DISCONTINUED | OUTPATIENT
Start: 2020-10-15 | End: 2020-10-17 | Stop reason: HOSPADM

## 2020-10-15 RX ORDER — VANCOMYCIN HCL IN 5 % DEXTROSE 1G/250ML
1000 PLASTIC BAG, INJECTION (ML) INTRAVENOUS
Status: DISCONTINUED | OUTPATIENT
Start: 2020-10-15 | End: 2020-10-15

## 2020-10-15 RX ORDER — FUROSEMIDE 10 MG/ML
80 INJECTION INTRAMUSCULAR; INTRAVENOUS 2 TIMES DAILY
Status: DISCONTINUED | OUTPATIENT
Start: 2020-10-15 | End: 2020-10-17 | Stop reason: HOSPADM

## 2020-10-15 RX ORDER — ONDANSETRON 2 MG/ML
4 INJECTION INTRAMUSCULAR; INTRAVENOUS EVERY 8 HOURS PRN
Status: DISCONTINUED | OUTPATIENT
Start: 2020-10-15 | End: 2020-10-17 | Stop reason: HOSPADM

## 2020-10-15 RX ORDER — GLUCAGON 1 MG
1 KIT INJECTION
Status: DISCONTINUED | OUTPATIENT
Start: 2020-10-15 | End: 2020-10-17 | Stop reason: HOSPADM

## 2020-10-15 RX ORDER — ACETAMINOPHEN 325 MG/1
650 TABLET ORAL EVERY 6 HOURS PRN
Status: DISCONTINUED | OUTPATIENT
Start: 2020-10-15 | End: 2020-10-17 | Stop reason: HOSPADM

## 2020-10-15 RX ORDER — IBUPROFEN 200 MG
24 TABLET ORAL
Status: DISCONTINUED | OUTPATIENT
Start: 2020-10-15 | End: 2020-10-17 | Stop reason: HOSPADM

## 2020-10-15 RX ORDER — SODIUM CHLORIDE 9 MG/ML
1000 INJECTION, SOLUTION INTRAVENOUS
Status: COMPLETED | OUTPATIENT
Start: 2020-10-15 | End: 2020-10-15

## 2020-10-15 RX ORDER — METOPROLOL TARTRATE 50 MG/1
100 TABLET ORAL 2 TIMES DAILY
Status: DISCONTINUED | OUTPATIENT
Start: 2020-10-15 | End: 2020-10-17 | Stop reason: HOSPADM

## 2020-10-15 RX ADMIN — SODIUM CHLORIDE 1000 ML: 0.9 INJECTION, SOLUTION INTRAVENOUS at 03:10

## 2020-10-15 RX ADMIN — VANCOMYCIN HYDROCHLORIDE 1250 MG: 1.25 INJECTION, POWDER, LYOPHILIZED, FOR SOLUTION INTRAVENOUS at 01:10

## 2020-10-15 RX ADMIN — METOPROLOL TARTRATE 100 MG: 50 TABLET, FILM COATED ORAL at 08:10

## 2020-10-15 RX ADMIN — PIPERACILLIN AND TAZOBACTAM 4.5 G: 4; .5 INJECTION, POWDER, LYOPHILIZED, FOR SOLUTION INTRAVENOUS; PARENTERAL at 01:10

## 2020-10-15 RX ADMIN — APIXABAN 2.5 MG: 2.5 TABLET, FILM COATED ORAL at 08:10

## 2020-10-15 RX ADMIN — SODIUM CHLORIDE 500 ML: 0.9 INJECTION, SOLUTION INTRAVENOUS at 11:10

## 2020-10-15 RX ADMIN — SODIUM CHLORIDE 1000 ML: 0.9 INJECTION, SOLUTION INTRAVENOUS at 01:10

## 2020-10-15 RX ADMIN — FUROSEMIDE 80 MG: 10 INJECTION, SOLUTION INTRAMUSCULAR; INTRAVENOUS at 10:10

## 2020-10-15 NOTE — ED NOTES
Pt/family member asking if the pt can eat. If so, mechanical soft diet required. Hospital medicine team B notified.

## 2020-10-15 NOTE — H&P
[facility]  History & Physical    Patient Name: Yan Pickering Jr.  MRN: [id number]  Admission Date: 10/15/2020  Attending Physician: Manohar Romero MD   Primary Care Provider: Brenden Issa MD         Patient information was obtained from patient, spouse/SO, past medical records and ER records.     Subjective:     Principal Problem:Acute on chronic combined systolic and diastolic CHF (congestive heart failure)    Chief Complaint:   Chief Complaint   Patient presents with    Wound Check     pressure wound on L leg being treated by wound care; states increased drainage and heat to area and new knot to lower abd        HPI: Yan Pickering Jr. is a 80 y.o. male patient with a PMHx of A-fib, CHF, CKD 4, Mild mitral insufficiency,chronic systolic CHF, cardiomyopathy, moderate aortic insufficiency, who presented to the ER for evaluation of a wound on L leg which has been treated yesterday at wound care but the drainage has not stopped until 4 AM. Pt's ex-wife was concerned if there is hernia on RLQ of abdomen. Symptoms are constant and moderate in severity. No mitigating or exacerbating factors reported. Associated sxs included increased drainage, wound is hot to touch,  RLQ abdominal pain, and fever (Tnow 99.8 F). Patient denies any SOB, CP, HA, n/v/d, cough, chills, and all other sxs at this time. No further complaints or concerns at this time. In ER, CT abdomen/pelvis showed moderate right hydronephrosis, bilateral inguinal hernias with loops of bowel within the hernia sacs, H/H 8.4/27.5, Creatinine 2.6 (baseline 2.9), BNP 1579, procalcitonin 0.39, Neg U/A, chest Xray subtle infiltrate right upper lobe with emphysematous changes. SDM: ex-wife, Chikis Bridges [phone].  Observation for Acute on chronic combined CHF and right hydronephrosis.     Past Medical History:   Diagnosis Date    Atrial fibrillation     Cardiomyopathy 4/26/2017    CHF (congestive heart failure)     CKD  (chronic kidney disease) stage 3, GFR 30-59 ml/min     Mild mitral insufficiency     Moderate aortic insufficiency     Venous stasis dermatitis of both lower extremities        History reviewed. No pertinent surgical history.    Review of patient's allergies indicates:   Allergen Reactions    Doxycycline Other (See Comments)     abd pain, bloating       No current facility-administered medications on file prior to encounter.      Current Outpatient Medications on File Prior to Encounter   Medication Sig    apixaban (ELIQUIS) 2.5 mg Tab Take 1 tablet (2.5 mg total) by mouth 2 (two) times daily.    losartan (COZAAR) 25 MG tablet Take 1 tablet (25 mg total) by mouth once daily.    metoprolol tartrate (LOPRESSOR) 100 MG tablet Take 1 tablet (100 mg total) by mouth 2 (two) times daily.    furosemide (LASIX) 40 MG tablet Take 1 tablet (40 mg total) by mouth daily as needed (swelling).    simethicone (MYLICON) 125 mg Cap capsule Take 125 mg by mouth 4 (four) times daily as needed.    [DISCONTINUED] docusate sodium (COLACE) 100 MG capsule Take 1 capsule (100 mg total) by mouth 2 (two) times daily.    [DISCONTINUED] polyethylene glycol (GLYCOLAX) 17 gram/dose powder Take 17 g by mouth once daily.     Family History     Problem Relation (Age of Onset)    Heart disease Father        Tobacco Use    Smoking status: Never Smoker    Smokeless tobacco: Never Used   Substance and Sexual Activity    Alcohol use: No    Drug use: No    Sexual activity: Not Currently     Review of Systems   Constitutional: Positive for activity change and fatigue. Negative for appetite change, chills, diaphoresis, fever and unexpected weight change.   HENT: Negative for congestion, ear discharge, ear pain, facial swelling, hearing loss, postnasal drip, sore throat, tinnitus, trouble swallowing and voice change.    Eyes: Negative for photophobia, pain, discharge, redness, itching and visual disturbance.   Respiratory: Negative for cough,  choking, chest tightness, shortness of breath, wheezing and stridor.    Cardiovascular: Positive for leg swelling. Negative for chest pain and palpitations.   Gastrointestinal: Positive for abdominal distention and abdominal pain (bilateral lower). Negative for blood in stool, constipation, diarrhea, nausea and vomiting.   Endocrine: Negative for cold intolerance, heat intolerance, polydipsia, polyphagia and polyuria.   Genitourinary: Negative for difficulty urinating, flank pain, frequency, hematuria and urgency.   Musculoskeletal: Negative for arthralgias, back pain, gait problem and myalgias.   Skin: Positive for wound (left and right leg wounds). Negative for color change, pallor and rash.   Neurological: Positive for weakness. Negative for dizziness, tremors, seizures, syncope, facial asymmetry, speech difficulty, light-headedness, numbness and headaches.   Hematological: Negative for adenopathy. Does not bruise/bleed easily.   Psychiatric/Behavioral: Negative for agitation, confusion, hallucinations and suicidal ideas. The patient is not nervous/anxious.    All other systems reviewed and are negative.    Objective:     Vital Signs (Most Recent):  Temp: 100.1 °F (37.8 °C) (10/15/20 1035)  Pulse: 102 (10/15/20 1453)  Resp: 20 (10/15/20 1453)  BP: (!) 85/61 (10/15/20 1453)  SpO2: 97 % (10/15/20 1453) Vital Signs (24h Range):  Temp:  [99.8 °F (37.7 °C)-100.1 °F (37.8 °C)] 100.1 °F (37.8 °C)  Pulse:  [102-115] 102  Resp:  [17-21] 20  SpO2:  [94 %-98 %] 97 %  BP: (85-95)/(46-61) 85/61     Weight: 57.9 kg (127 lb 10.3 oz)  Body mass index is 18.85 kg/m².    Physical Exam  Vitals signs and nursing note reviewed.   Constitutional:       Appearance: He is well-developed.   HENT:      Head: Normocephalic and atraumatic.      Nose: Nose normal.   Eyes:      Conjunctiva/sclera: Conjunctivae normal.      Pupils: Pupils are equal, round, and reactive to light.   Neck:      Musculoskeletal: Normal range of motion and neck  supple.      Thyroid: No thyromegaly.      Vascular: No JVD.      Trachea: No tracheal deviation.   Cardiovascular:      Rate and Rhythm: Normal rate. Rhythm irregular.      Heart sounds: Normal heart sounds. No murmur. No friction rub. No gallop.    Pulmonary:      Effort: Pulmonary effort is normal. No respiratory distress.      Breath sounds: No stridor. No wheezing or rales.   Chest:      Chest wall: No tenderness.   Abdominal:      General: Bowel sounds are normal. There is distension.      Palpations: Abdomen is soft.      Tenderness: There is abdominal tenderness. There is no guarding or rebound.   Musculoskeletal: Normal range of motion.         General: No tenderness or deformity.   Skin:     General: Skin is warm and dry.      Coloration: Skin is not pale.      Findings: No erythema or rash.   Neurological:      Mental Status: He is alert and oriented to person, place, and time.      Cranial Nerves: No cranial nerve deficit.      Coordination: Coordination normal.      Deep Tendon Reflexes: Reflexes are normal and symmetric.   Psychiatric:         Behavior: Behavior normal.         Thought Content: Thought content normal.         Judgment: Judgment normal.                       CRANIAL NERVES     CN III, IV, VI   Pupils are equal, round, and reactive to light.       Significant Labs: All pertinent labs within the past 24 hours have been reviewed.  Results for orders placed or performed during the hospital encounter of 10/15/20   CBC auto differential   Result Value Ref Range    WBC 12.16 3.90 - 12.70 K/uL    RBC 2.80 (L) 4.60 - 6.20 M/uL    Hemoglobin 8.4 (L) 14.0 - 18.0 g/dL    Hematocrit 27.5 (L) 40.0 - 54.0 %    Mean Corpuscular Volume 98 82 - 98 fL    Mean Corpuscular Hemoglobin 30.0 27.0 - 31.0 pg    Mean Corpuscular Hemoglobin Conc 30.5 (L) 32.0 - 36.0 g/dL    RDW 16.1 (H) 11.5 - 14.5 %    Platelets 143 (L) 150 - 350 K/uL    MPV 10.1 9.2 - 12.9 fL    Immature Granulocytes 1.1 (H) 0.0 - 0.5 %    Gran  # (ANC) 10.1 (H) 1.8 - 7.7 K/uL    Immature Grans (Abs) 0.13 (H) 0.00 - 0.04 K/uL    Lymph # 0.5 (L) 1.0 - 4.8 K/uL    Mono # 1.3 (H) 0.3 - 1.0 K/uL    Eos # 0.0 0.0 - 0.5 K/uL    Baso # 0.03 0.00 - 0.20 K/uL    nRBC 0 0 /100 WBC    Gran% 83.2 (H) 38.0 - 73.0 %    Lymph% 4.4 (L) 18.0 - 48.0 %    Mono% 10.9 4.0 - 15.0 %    Eosinophil% 0.2 0.0 - 8.0 %    Basophil% 0.2 0.0 - 1.9 %    Differential Method Automated    Comprehensive metabolic panel   Result Value Ref Range    Sodium 137 136 - 145 mmol/L    Potassium 4.9 3.5 - 5.1 mmol/L    Chloride 102 95 - 110 mmol/L    CO2 24 23 - 29 mmol/L    Glucose 106 70 - 110 mg/dL    BUN, Bld 56 (H) 8 - 23 mg/dL    Creatinine 2.6 (H) 0.5 - 1.4 mg/dL    Calcium 8.8 8.7 - 10.5 mg/dL    Total Protein 6.6 6.0 - 8.4 g/dL    Albumin 2.8 (L) 3.5 - 5.2 g/dL    Total Bilirubin 1.1 (H) 0.1 - 1.0 mg/dL    Alkaline Phosphatase 71 55 - 135 U/L    AST 17 10 - 40 U/L    ALT 10 10 - 44 U/L    Anion Gap 11 8 - 16 mmol/L    eGFR if African American 25 (A) >60 mL/min/1.73 m^2    eGFR if non African American 22 (A) >60 mL/min/1.73 m^2   Lactic acid, plasma   Result Value Ref Range    Lactate (Lactic Acid) 1.0 0.5 - 2.2 mmol/L   APTT   Result Value Ref Range    aPTT 38.8 (H) 21.0 - 32.0 sec   Protime-INR   Result Value Ref Range    Prothrombin Time 13.1 (H) 9.0 - 12.5 sec    INR 1.2 0.8 - 1.2   Troponin I   Result Value Ref Range    Troponin I 0.011 0.000 - 0.026 ng/mL   B-Type natriuretic peptide (BNP)   Result Value Ref Range    BNP 1,579 (H) 0 - 99 pg/mL   COVID-19 Rapid Screening   Result Value Ref Range    SARS-CoV-2 RNA, Amplification, Qual Negative Negative   Urinalysis, Reflex to Urine Culture Urine, Clean Catch    Specimen: Urine   Result Value Ref Range    Specimen UA Urine, Clean Catch     Color, UA Yellow Yellow, Straw, Vero    Appearance, UA Clear Clear    pH, UA 6.0 5.0 - 8.0    Specific Gravity, UA 1.020 1.005 - 1.030    Protein, UA Negative Negative    Glucose, UA Negative Negative     Ketones, UA Negative Negative    Bilirubin (UA) Negative Negative    Occult Blood UA Negative Negative    Nitrite, UA Negative Negative    Urobilinogen, UA Negative <2.0 EU/dL    Leukocytes, UA Negative Negative   Procalcitonin   Result Value Ref Range    Procalcitonin 0.39 (H) <0.25 ng/mL     Significant Imaging: I have reviewed all pertinent imaging results/findings within the past 24 hours.   Imaging Results          CT Abdomen Pelvis  Without Contrast (Final result)  Result time 10/15/20 13:01:28   Procedure changed from CT Abdomen Pelvis With Contrast / no oral contrast     Final result by Alber Ohara MD (10/15/20 13:01:28)                 Impression:      1.  There is been interval development of moderate right hydronephrosis and hydroureter in this patient who has a right pelvic kidney.  No obvious obstructing process is seen.  A recently passed stone could have this appearance.  A radiolucent obstructing process at the UVJ could cause this appearance.  This could also be related to the moderate bladder distention that it is again seen.  Urologic consultation should be considered.    2. Negative for acute process otherwise.  Negative for new inflammatory changes.  Normal appendix.  Negative for left hydronephrosis.    3.  Numerous stable findings as noted above, to include cardiac chamber enlargement with coronary artery calcifications, small effusions with adjacent atelectasis, multiple hepatic cysts, bilateral renal cysts measuring up to 6.5 cm, osteopenia with degenerative changes of the spine and pelvis, bilateral inguinal hernias with loops of bowel within the hernia sacs, but without obstruction, wide neck anterior bladder diverticulum and prostate enlargement with calcifications.    All CT scans at this facility are performed  using dose modulation techniques as appropriate to performed exam including the following:  automated exposure control; adjustment of mA and/or kV according to the patients  size (this includes techniques or standardized protocols for targeted exams where dose is matched to indication/reason for exam: i.e. extremities or head);  iterative reconstruction technique.      Electronically signed by: Alber Ohara MD  Date:    10/15/2020  Time:    13:01             Narrative:    EXAMINATION:  CT ABDOMEN PELVIS WITHOUT CONTRAST, multiplanar reconstructions    CLINICAL HISTORY:  Bowel obstruction high-grade suspected;    TECHNIQUE:  Axial images through the abdomen and pelvis were obtained without the use of IV contrast.  Sagittal and coronal reconstructions are provided for review.  Oral contrast was not utilized.    COMPARISON:  May 26, 2020    FINDINGS:  LUNG BASES: Tiny bilateral pleural effusions are again seen, with similar degree of adjacent scarring or atelectasis.  In the left greater than right lung bases.  Lung bases are free of new pulmonary opacities.  The distal esophagus is normal.  Cardiac chambers remain enlarged with coronary artery calcifications.  Stable small pericardial effusion.    ABDOMEN: The too numerous to count low-attenuation foci throughout the liver, measuring up to 1.8 cm, are similar in size in distribution.  Stable right pelvic kidney, with a 6.5 cm cyst in the inferior pole.  Stable cyst within the inferior right kidney measuring approximately 4 cm.    There is been interval development of moderate right hydronephrosis and hydroureter, which is quite tortuous.  No obvious obstructing process is seen.    The liver, spleen and gallbladder otherwise appear normal.  The pancreas is unremarkable.  Kidneys and adrenal glands are otherwise normal.    Negative for adenopathy or ascites noted within the abdomen or pelvis.  Vascular calcifications are present without aneurysmal changes.  Again seen is mild induration of the fat in the both pericolic gutters, nonspecific finding.    The bowel appears normal. Normal appendix.  Again seen are bilateral inguinal hernias,  with loops of bowel within both hernia sacs, without obvious obstruction.  Negative for free air.    PELVIS: The urinary bladder is moderately distended, with stable wide-based anterior bladder diverticulum.  Prostate gland remains enlarged with calcifications.  The rest of the male pelvic organs are normal. There are pelvic phleboliths.    No significant osseous abnormality is identified.  Negative for significant spinal canal stenosis. Stable osteopenia and mild degenerative changes of the spine and pelvis.  Convex left curvature of the lumbar spine again seen.    Negative for groin adenopathy.    The abdominal wall is intact.                               X-Ray Chest AP Portable (Final result)  Result time 10/15/20 10:40:56    Final result by Samson Mehta MD (10/15/20 10:40:56)                 Impression:      Subtle infiltrate right upper lobe.      Electronically signed by: Samson Mehta MD  Date:    10/15/2020  Time:    10:40             Narrative:    EXAMINATION:  XR CHEST AP PORTABLE    CLINICAL HISTORY:  Chest Pain;    FINDINGS:  Single view of the chest.  Comparison 05/26/2020    Cardiac silhouette is enlarged.  The lungs demonstrate no evidence of consolidation.  Subtle infiltrate right upper lobe.  Emphysematous changes are noted.  No evidence of significant pleural effusion or pneumothorax.  Bones appear intact.                                  Assessment/Plan:     * Acute on chronic combined systolic and diastolic CHF (congestive heart failure)  Acute on chronic systolic and diastolic CHF  -IV lasix after maharaj placed  -cardiac diet with 1500 milliliter(s) fluid restriction  -daily weights  -strict I &O  -Metoprolol 100 mg BID   - Losartan 25 mg BID  -Repeat ECHO          Abdominal pain  -Surgical consult for bilateral hernias      Urinary retention  -Maharaj  -Urology consult for hydronephrosis  -strict I&O      Chronic atrial fibrillation  -continue eliquis  -continue  mg BID  -Tachy  100's    Venous stasis dermatitis of both lower extremities  -wound care consult  -U/S bilateral legs for DVT      CKD (chronic kidney disease) stage 4, GFR 15-29 ml/min  10/15/20 Creatinine 2.6. (Baseline 2.5-2.9)  -monitor labs  -avoid nephrotoxic labs      VTE Risk Mitigation (From admission, onward)         Ordered     apixaban tablet 2.5 mg  2 times daily      10/15/20 1833     IP VTE HIGH RISK PATIENT  Once      10/15/20 1833     Place sequential compression device  Until discontinued      10/15/20 1833                   Yael Peck NP  Department of Hospital Medicine   [facility]

## 2020-10-15 NOTE — ASSESSMENT & PLAN NOTE
Acute on chronic systolic and diastolic CHF  -IV lasix after maharaj placed  -cardiac diet with 1500 milliliter(s) fluid restriction  -daily weights  -strict I &O  -Metoprolol 100 mg BID   - Losartan 25 mg BID  -Repeat ECHO

## 2020-10-15 NOTE — CONSULTS
Inpatient consult to Urology  Consult performed by: Joaquim Pritchett MD  Consult ordered by: Yael Peck NP  Reason for consult: right hydronephrosis                       Urology Consult    Consulting Provider: Yael Peck NP    Reason for consultation: right hydronephrosis    Chief Complaint: right hydronephrosis, distended bladder    HPI:    Yan Pickering Jr. is a 80 y.o. male who presented to [facility] for evaluation of left leg wound. His ex-wife was concerned that there was a RLQ hernia . CT a/p revealed right hydronephrosis as well as a distended bladder. Patient denies any issues voiding. He states that he voids with a good stream and subjectively empties his bladder well. He does not take any medications for his prostate and has not seen a urologist prior. He denies any dysuria, UTIs, or hematuria. Patient does have hx of CKD, but Scr on admit is at his baseline.     Past Medical History:   Diagnosis Date    Atrial fibrillation     Cardiomyopathy 4/26/2017    CHF (congestive heart failure)     CKD (chronic kidney disease) stage 3, GFR 30-59 ml/min     Mild mitral insufficiency     Moderate aortic insufficiency     Venous stasis dermatitis of both lower extremities         History reviewed. No pertinent surgical history.     Social History     Socioeconomic History    Marital status: Single     Spouse name: Not on file    Number of children: Not on file    Years of education: Not on file    Highest education level: Not on file   Occupational History    Not on file   Social Needs    Financial resource strain: Not on file    Food insecurity     Worry: Not on file     Inability: Not on file    Transportation needs     Medical: Not on file     Non-medical: Not on file   Tobacco Use    Smoking status: Never Smoker    Smokeless tobacco: Never Used   Substance and Sexual Activity    Alcohol use: No    Drug use: No    Sexual activity: Not Currently   Lifestyle    Physical  activity     Days per week: Not on file     Minutes per session: Not on file    Stress: Not on file   Relationships    Social connections     Talks on phone: Not on file     Gets together: Not on file     Attends Hindu service: Not on file     Active member of club or organization: Not on file     Attends meetings of clubs or organizations: Not on file     Relationship status: Not on file   Other Topics Concern    Not on file   Social History Narrative    SDM: ex-wife, Chikis Bridges [phone].         Family History   Problem Relation Age of Onset    Heart disease Father         Review of patient's allergies indicates:   Allergen Reactions    Doxycycline Other (See Comments)     abd pain, bloating       Medications:      No current facility-administered medications on file prior to encounter.      Current Outpatient Medications on File Prior to Encounter   Medication Sig Dispense Refill    apixaban (ELIQUIS) 2.5 mg Tab Take 1 tablet (2.5 mg total) by mouth 2 (two) times daily. 60 tablet 11    losartan (COZAAR) 25 MG tablet Take 1 tablet (25 mg total) by mouth once daily. 30 tablet 11    metoprolol tartrate (LOPRESSOR) 100 MG tablet Take 1 tablet (100 mg total) by mouth 2 (two) times daily. 60 tablet 11    furosemide (LASIX) 40 MG tablet Take 1 tablet (40 mg total) by mouth daily as needed (swelling). 15 tablet 11    simethicone (MYLICON) 125 mg Cap capsule Take 125 mg by mouth 4 (four) times daily as needed.      [DISCONTINUED] docusate sodium (COLACE) 100 MG capsule Take 1 capsule (100 mg total) by mouth 2 (two) times daily. 60 capsule 0    [DISCONTINUED] polyethylene glycol (GLYCOLAX) 17 gram/dose powder Take 17 g by mouth once daily. 510 g 0       VITALS (Last 24H):  Temp:  [97.4 °F (36.3 °C)-100.1 °F (37.8 °C)]   Pulse:  [102-115]   Resp:  [17-21]   BP: (85-96)/(46-61)   SpO2:  [94 %-99 %]     INTAKE & OUTPUT (Last 24H):    Intake/Output Summary (Last 24 hours) at 10/15/2020 1903  Last data  filed at 10/15/2020 1521  Gross per 24 hour   Intake 1850 ml   Output --   Net 1850 ml       REVIEW OF SYSTEMS:        Constitutional: Negative for fever, chills   HENT: Negative for ear pain  Eyes: Negative for change in vision   Respiratory: Negative for shortness of breath  Cardiovascular: Negative for chest pain, POSITIVE for LE edema  Gastrointestinal: Negative for abdominal pain  Genitourinary: Negative for dysuria, urgency, frequency, hematuria and flank pain.   Musculoskeletal: Negative for back pain   Skin: POSITIVE for rash.   Neurological: Negative for dizziness or lightheadedness  Endo/Heme/Allergies: Negative for environmental allergies and polydipsia. Does not bruise/bleed easily.   Psychiatric/Behavioral: Negative for depression  All 14 systems reviewed and negative except as noted above.     PHYSICAL EXAM:    Constitutional: Appears well-developed but very thin, no acute distress.  Patient is oriented to person, place, and time.   Head: Normocephalic and atraumatic. Mucous membranes moist.  Neck: Neck supple no mass.   Cardiovascular: well-perfused   Pulmonary/Chest: Effort normal, no respiratory distress.   Abdomen: Soft, Non-tender and non-distended.  : normal penis and testicles, meatus normal in size and position, no masses, ANNAMARIA smooth normal prostate, no nodule, large gland, ~50gm  Neurological: Moves all extremities.  Skin: Warm and dry. No lesions.  Extremities: No clubbing, cyanosis or edema.    Laboratory Data:  Reviewed and noted in plan where applicable. Please see chart for full laboratory data.    Recent Labs   Lab 10/15/20  1040   TROPONINI 0.011    No results for input(s): POCTGLUCOSE in the last 24 hours.     Lab Results   Component Value Date    INR 1.2 10/15/2020    INR 1.3 (H) 05/13/2020    INR 1.1 01/23/2020       Lab Results   Component Value Date    WBC 12.16 10/15/2020    HGB 8.4 (L) 10/15/2020    HCT 27.5 (L) 10/15/2020    MCV 98 10/15/2020     (L) 10/15/2020        BMP  Recent Labs   Lab 10/15/20  1040         K 4.9      CO2 24   BUN 56*   CREATININE 2.6*   CALCIUM 8.8         Radiology:  CT ABDOMEN PELVIS WITHOUT CONTRAST 10/15/2020     CLINICAL HISTORY:  Bowel obstruction high-grade suspected;     TECHNIQUE:  Axial images through the abdomen and pelvis were obtained without the use of IV contrast.  Sagittal and coronal reconstructions are provided for review.  Oral contrast was not utilized.     COMPARISON:  May 26, 2020     FINDINGS:  LUNG BASES: Tiny bilateral pleural effusions are again seen, with similar degree of adjacent scarring or atelectasis.  In the left greater than right lung bases.  Lung bases are free of new pulmonary opacities.  The distal esophagus is normal.  Cardiac chambers remain enlarged with coronary artery calcifications.  Stable small pericardial effusion.     ABDOMEN: The too numerous to count low-attenuation foci throughout the liver, measuring up to 1.8 cm, are similar in size in distribution.  Stable right pelvic kidney, with a 6.5 cm cyst in the inferior pole.  Stable cyst within the inferior right kidney measuring approximately 4 cm.     There is been interval development of moderate right hydronephrosis and hydroureter, which is quite tortuous.  No obvious obstructing process is seen.     The liver, spleen and gallbladder otherwise appear normal.  The pancreas is unremarkable.  Kidneys and adrenal glands are otherwise normal.     Negative for adenopathy or ascites noted within the abdomen or pelvis.  Vascular calcifications are present without aneurysmal changes.  Again seen is mild induration of the fat in the both pericolic gutters, nonspecific finding.     The bowel appears normal. Normal appendix.  Again seen are bilateral inguinal hernias, with loops of bowel within both hernia sacs, without obvious obstruction.  Negative for free air.     PELVIS: The urinary bladder is moderately distended, with stable wide-based  anterior bladder diverticulum.  Prostate gland remains enlarged with calcifications.  The rest of the male pelvic organs are normal. There are pelvic phleboliths.     No significant osseous abnormality is identified.  Negative for significant spinal canal stenosis. Stable osteopenia and mild degenerative changes of the spine and pelvis.  Convex left curvature of the lumbar spine again seen.     Negative for groin adenopathy.     The abdominal wall is intact.     Impression:     1.  There is been interval development of moderate right hydronephrosis and hydroureter in this patient who has a right pelvic kidney.  No obvious obstructing process is seen.  A recently passed stone could have this appearance.  A radiolucent obstructing process at the UVJ could cause this appearance.  This could also be related to the moderate bladder distention that it is again seen.  Urologic consultation should be considered.     2. Negative for acute process otherwise.  Negative for new inflammatory changes.  Normal appendix.  Negative for left hydronephrosis.     3.  Numerous stable findings as noted above, to include cardiac chamber enlargement with coronary artery calcifications, small effusions with adjacent atelectasis, multiple hepatic cysts, bilateral renal cysts measuring up to 6.5 cm, osteopenia with degenerative changes of the spine and pelvis, bilateral inguinal hernias with loops of bowel within the hernia sacs, but without obstruction, wide neck anterior bladder diverticulum and prostate enlargement with calcifications.       ASSESSMENT/PLAN:   1. Incomplete bladder emptying and right hydronephrosis of pelvic kidney - patient likely has developed right hydro due to worsening incomplete bladder emptying.   - strongly recommend placement of maharaj catheter (patient is amenable) and would continue maharaj catheter at discharge   - would recommend starting flomax, as long as primary team does not anticipate worsening of CHF  -  will plan to have patient f/u in clinic to discuss maharaj catheter plan or possible outlet procedure  - will continue to follow patient during admission, please call with any questions or concerns    Joaquim Prtichett MD  Urology

## 2020-10-15 NOTE — ED NOTES
Pt resting in bed comfortably, pt in NAD. Call light in reach, bed in lowest position, rails up X2. Pt denies any other needs at this time. Will continue to monitor.

## 2020-10-15 NOTE — SUBJECTIVE & OBJECTIVE
Past Medical History:   Diagnosis Date    Atrial fibrillation     Cardiomyopathy 4/26/2017    CHF (congestive heart failure)     CKD (chronic kidney disease) stage 3, GFR 30-59 ml/min     Mild mitral insufficiency     Moderate aortic insufficiency     Venous stasis dermatitis of both lower extremities        History reviewed. No pertinent surgical history.    Review of patient's allergies indicates:   Allergen Reactions    Doxycycline Other (See Comments)     abd pain, bloating       No current facility-administered medications on file prior to encounter.      Current Outpatient Medications on File Prior to Encounter   Medication Sig    apixaban (ELIQUIS) 2.5 mg Tab Take 1 tablet (2.5 mg total) by mouth 2 (two) times daily.    losartan (COZAAR) 25 MG tablet Take 1 tablet (25 mg total) by mouth once daily.    metoprolol tartrate (LOPRESSOR) 100 MG tablet Take 1 tablet (100 mg total) by mouth 2 (two) times daily.    furosemide (LASIX) 40 MG tablet Take 1 tablet (40 mg total) by mouth daily as needed (swelling).    simethicone (MYLICON) 125 mg Cap capsule Take 125 mg by mouth 4 (four) times daily as needed.    [DISCONTINUED] docusate sodium (COLACE) 100 MG capsule Take 1 capsule (100 mg total) by mouth 2 (two) times daily.    [DISCONTINUED] polyethylene glycol (GLYCOLAX) 17 gram/dose powder Take 17 g by mouth once daily.     Family History     Problem Relation (Age of Onset)    Heart disease Father        Tobacco Use    Smoking status: Never Smoker    Smokeless tobacco: Never Used   Substance and Sexual Activity    Alcohol use: No    Drug use: No    Sexual activity: Not Currently     Review of Systems   Constitutional: Positive for activity change and fatigue. Negative for appetite change, chills, diaphoresis, fever and unexpected weight change.   HENT: Negative for congestion, ear discharge, ear pain, facial swelling, hearing loss, postnasal drip, sore throat, tinnitus, trouble swallowing and voice  change.    Eyes: Negative for photophobia, pain, discharge, redness, itching and visual disturbance.   Respiratory: Negative for cough, choking, chest tightness, shortness of breath, wheezing and stridor.    Cardiovascular: Positive for leg swelling. Negative for chest pain and palpitations.   Gastrointestinal: Positive for abdominal distention and abdominal pain (bilateral lower). Negative for blood in stool, constipation, diarrhea, nausea and vomiting.   Endocrine: Negative for cold intolerance, heat intolerance, polydipsia, polyphagia and polyuria.   Genitourinary: Negative for difficulty urinating, flank pain, frequency, hematuria and urgency.   Musculoskeletal: Negative for arthralgias, back pain, gait problem and myalgias.   Skin: Positive for wound (left and right leg wounds). Negative for color change, pallor and rash.   Neurological: Positive for weakness. Negative for dizziness, tremors, seizures, syncope, facial asymmetry, speech difficulty, light-headedness, numbness and headaches.   Hematological: Negative for adenopathy. Does not bruise/bleed easily.   Psychiatric/Behavioral: Negative for agitation, confusion, hallucinations and suicidal ideas. The patient is not nervous/anxious.    All other systems reviewed and are negative.    Objective:     Vital Signs (Most Recent):  Temp: 100.1 °F (37.8 °C) (10/15/20 1035)  Pulse: 102 (10/15/20 1453)  Resp: 20 (10/15/20 1453)  BP: (!) 85/61 (10/15/20 1453)  SpO2: 97 % (10/15/20 1453) Vital Signs (24h Range):  Temp:  [99.8 °F (37.7 °C)-100.1 °F (37.8 °C)] 100.1 °F (37.8 °C)  Pulse:  [102-115] 102  Resp:  [17-21] 20  SpO2:  [94 %-98 %] 97 %  BP: (85-95)/(46-61) 85/61     Weight: 57.9 kg (127 lb 10.3 oz)  Body mass index is 18.85 kg/m².    Physical Exam  Vitals signs and nursing note reviewed.   Constitutional:       Appearance: He is well-developed.   HENT:      Head: Normocephalic and atraumatic.      Nose: Nose normal.   Eyes:      Conjunctiva/sclera:  Conjunctivae normal.      Pupils: Pupils are equal, round, and reactive to light.   Neck:      Musculoskeletal: Normal range of motion and neck supple.      Thyroid: No thyromegaly.      Vascular: No JVD.      Trachea: No tracheal deviation.   Cardiovascular:      Rate and Rhythm: Normal rate. Rhythm irregular.      Heart sounds: Normal heart sounds. No murmur. No friction rub. No gallop.    Pulmonary:      Effort: Pulmonary effort is normal. No respiratory distress.      Breath sounds: No stridor. No wheezing or rales.   Chest:      Chest wall: No tenderness.   Abdominal:      General: Bowel sounds are normal. There is distension.      Palpations: Abdomen is soft.      Tenderness: There is abdominal tenderness. There is no guarding or rebound.   Musculoskeletal: Normal range of motion.         General: No tenderness or deformity.   Skin:     General: Skin is warm and dry.      Coloration: Skin is not pale.      Findings: No erythema or rash.   Neurological:      Mental Status: He is alert and oriented to person, place, and time.      Cranial Nerves: No cranial nerve deficit.      Coordination: Coordination normal.      Deep Tendon Reflexes: Reflexes are normal and symmetric.   Psychiatric:         Behavior: Behavior normal.         Thought Content: Thought content normal.         Judgment: Judgment normal.                       CRANIAL NERVES     CN III, IV, VI   Pupils are equal, round, and reactive to light.       Significant Labs: All pertinent labs within the past 24 hours have been reviewed.  Results for orders placed or performed during the hospital encounter of 10/15/20   CBC auto differential   Result Value Ref Range    WBC 12.16 3.90 - 12.70 K/uL    RBC 2.80 (L) 4.60 - 6.20 M/uL    Hemoglobin 8.4 (L) 14.0 - 18.0 g/dL    Hematocrit 27.5 (L) 40.0 - 54.0 %    Mean Corpuscular Volume 98 82 - 98 fL    Mean Corpuscular Hemoglobin 30.0 27.0 - 31.0 pg    Mean Corpuscular Hemoglobin Conc 30.5 (L) 32.0 - 36.0 g/dL     RDW 16.1 (H) 11.5 - 14.5 %    Platelets 143 (L) 150 - 350 K/uL    MPV 10.1 9.2 - 12.9 fL    Immature Granulocytes 1.1 (H) 0.0 - 0.5 %    Gran # (ANC) 10.1 (H) 1.8 - 7.7 K/uL    Immature Grans (Abs) 0.13 (H) 0.00 - 0.04 K/uL    Lymph # 0.5 (L) 1.0 - 4.8 K/uL    Mono # 1.3 (H) 0.3 - 1.0 K/uL    Eos # 0.0 0.0 - 0.5 K/uL    Baso # 0.03 0.00 - 0.20 K/uL    nRBC 0 0 /100 WBC    Gran% 83.2 (H) 38.0 - 73.0 %    Lymph% 4.4 (L) 18.0 - 48.0 %    Mono% 10.9 4.0 - 15.0 %    Eosinophil% 0.2 0.0 - 8.0 %    Basophil% 0.2 0.0 - 1.9 %    Differential Method Automated    Comprehensive metabolic panel   Result Value Ref Range    Sodium 137 136 - 145 mmol/L    Potassium 4.9 3.5 - 5.1 mmol/L    Chloride 102 95 - 110 mmol/L    CO2 24 23 - 29 mmol/L    Glucose 106 70 - 110 mg/dL    BUN, Bld 56 (H) 8 - 23 mg/dL    Creatinine 2.6 (H) 0.5 - 1.4 mg/dL    Calcium 8.8 8.7 - 10.5 mg/dL    Total Protein 6.6 6.0 - 8.4 g/dL    Albumin 2.8 (L) 3.5 - 5.2 g/dL    Total Bilirubin 1.1 (H) 0.1 - 1.0 mg/dL    Alkaline Phosphatase 71 55 - 135 U/L    AST 17 10 - 40 U/L    ALT 10 10 - 44 U/L    Anion Gap 11 8 - 16 mmol/L    eGFR if African American 25 (A) >60 mL/min/1.73 m^2    eGFR if non African American 22 (A) >60 mL/min/1.73 m^2   Lactic acid, plasma   Result Value Ref Range    Lactate (Lactic Acid) 1.0 0.5 - 2.2 mmol/L   APTT   Result Value Ref Range    aPTT 38.8 (H) 21.0 - 32.0 sec   Protime-INR   Result Value Ref Range    Prothrombin Time 13.1 (H) 9.0 - 12.5 sec    INR 1.2 0.8 - 1.2   Troponin I   Result Value Ref Range    Troponin I 0.011 0.000 - 0.026 ng/mL   B-Type natriuretic peptide (BNP)   Result Value Ref Range    BNP 1,579 (H) 0 - 99 pg/mL   COVID-19 Rapid Screening   Result Value Ref Range    SARS-CoV-2 RNA, Amplification, Qual Negative Negative   Urinalysis, Reflex to Urine Culture Urine, Clean Catch    Specimen: Urine   Result Value Ref Range    Specimen UA Urine, Clean Catch     Color, UA Yellow Yellow, Straw, Vero    Appearance, UA  Clear Clear    pH, UA 6.0 5.0 - 8.0    Specific Gravity, UA 1.020 1.005 - 1.030    Protein, UA Negative Negative    Glucose, UA Negative Negative    Ketones, UA Negative Negative    Bilirubin (UA) Negative Negative    Occult Blood UA Negative Negative    Nitrite, UA Negative Negative    Urobilinogen, UA Negative <2.0 EU/dL    Leukocytes, UA Negative Negative   Procalcitonin   Result Value Ref Range    Procalcitonin 0.39 (H) <0.25 ng/mL     Significant Imaging: I have reviewed all pertinent imaging results/findings within the past 24 hours.   Imaging Results          CT Abdomen Pelvis  Without Contrast (Final result)  Result time 10/15/20 13:01:28   Procedure changed from CT Abdomen Pelvis With Contrast / no oral contrast     Final result by Alber Ohara MD (10/15/20 13:01:28)                 Impression:      1.  There is been interval development of moderate right hydronephrosis and hydroureter in this patient who has a right pelvic kidney.  No obvious obstructing process is seen.  A recently passed stone could have this appearance.  A radiolucent obstructing process at the UVJ could cause this appearance.  This could also be related to the moderate bladder distention that it is again seen.  Urologic consultation should be considered.    2. Negative for acute process otherwise.  Negative for new inflammatory changes.  Normal appendix.  Negative for left hydronephrosis.    3.  Numerous stable findings as noted above, to include cardiac chamber enlargement with coronary artery calcifications, small effusions with adjacent atelectasis, multiple hepatic cysts, bilateral renal cysts measuring up to 6.5 cm, osteopenia with degenerative changes of the spine and pelvis, bilateral inguinal hernias with loops of bowel within the hernia sacs, but without obstruction, wide neck anterior bladder diverticulum and prostate enlargement with calcifications.    All CT scans at this facility are performed  using dose modulation  techniques as appropriate to performed exam including the following:  automated exposure control; adjustment of mA and/or kV according to the patients size (this includes techniques or standardized protocols for targeted exams where dose is matched to indication/reason for exam: i.e. extremities or head);  iterative reconstruction technique.      Electronically signed by: Alber Ohara MD  Date:    10/15/2020  Time:    13:01             Narrative:    EXAMINATION:  CT ABDOMEN PELVIS WITHOUT CONTRAST, multiplanar reconstructions    CLINICAL HISTORY:  Bowel obstruction high-grade suspected;    TECHNIQUE:  Axial images through the abdomen and pelvis were obtained without the use of IV contrast.  Sagittal and coronal reconstructions are provided for review.  Oral contrast was not utilized.    COMPARISON:  May 26, 2020    FINDINGS:  LUNG BASES: Tiny bilateral pleural effusions are again seen, with similar degree of adjacent scarring or atelectasis.  In the left greater than right lung bases.  Lung bases are free of new pulmonary opacities.  The distal esophagus is normal.  Cardiac chambers remain enlarged with coronary artery calcifications.  Stable small pericardial effusion.    ABDOMEN: The too numerous to count low-attenuation foci throughout the liver, measuring up to 1.8 cm, are similar in size in distribution.  Stable right pelvic kidney, with a 6.5 cm cyst in the inferior pole.  Stable cyst within the inferior right kidney measuring approximately 4 cm.    There is been interval development of moderate right hydronephrosis and hydroureter, which is quite tortuous.  No obvious obstructing process is seen.    The liver, spleen and gallbladder otherwise appear normal.  The pancreas is unremarkable.  Kidneys and adrenal glands are otherwise normal.    Negative for adenopathy or ascites noted within the abdomen or pelvis.  Vascular calcifications are present without aneurysmal changes.  Again seen is mild induration of  the fat in the both pericolic gutters, nonspecific finding.    The bowel appears normal. Normal appendix.  Again seen are bilateral inguinal hernias, with loops of bowel within both hernia sacs, without obvious obstruction.  Negative for free air.    PELVIS: The urinary bladder is moderately distended, with stable wide-based anterior bladder diverticulum.  Prostate gland remains enlarged with calcifications.  The rest of the male pelvic organs are normal. There are pelvic phleboliths.    No significant osseous abnormality is identified.  Negative for significant spinal canal stenosis. Stable osteopenia and mild degenerative changes of the spine and pelvis.  Convex left curvature of the lumbar spine again seen.    Negative for groin adenopathy.    The abdominal wall is intact.                               X-Ray Chest AP Portable (Final result)  Result time 10/15/20 10:40:56    Final result by Samson Mehta MD (10/15/20 10:40:56)                 Impression:      Subtle infiltrate right upper lobe.      Electronically signed by: Samson Mehta MD  Date:    10/15/2020  Time:    10:40             Narrative:    EXAMINATION:  XR CHEST AP PORTABLE    CLINICAL HISTORY:  Chest Pain;    FINDINGS:  Single view of the chest.  Comparison 05/26/2020    Cardiac silhouette is enlarged.  The lungs demonstrate no evidence of consolidation.  Subtle infiltrate right upper lobe.  Emphysematous changes are noted.  No evidence of significant pleural effusion or pneumothorax.  Bones appear intact.

## 2020-10-15 NOTE — HPI
Yan Pickering Jr. is a 80 y.o. male patient with a PMHx of A-fib, CHF, CKD 4, Mild mitral insufficiency,chronic systolic CHF, cardiomyopathy, moderate aortic insufficiency, who presented to the ER for evaluation of a wound on L leg which has been treated yesterday at wound care but the drainage has not stopped until 4 AM. Pt's ex-wife was concerned if there is hernia on RLQ of abdomen. Symptoms are constant and moderate in severity. No mitigating or exacerbating factors reported. Associated sxs included increased drainage, wound is hot to touch,  RLQ abdominal pain, and fever (Tnow 99.8 F). Patient denies any SOB, CP, HA, n/v/d, cough, chills, and all other sxs at this time. No further complaints or concerns at this time. In ER, CT abdomen/pelvis showed moderate right hydronephrosis, bilateral inguinal hernias with loops of bowel within the hernia sacs, H/H 8.4/27.5, Creatinine 2.6 (baseline 2.9), BNP 1579, procalcitonin 0.39, Neg U/A, chest Xray subtle infiltrate right upper lobe with emphysematous changes. SDM: ex-wife, Chikis Bridges [phone].  Observation for Acute on chronic combined CHF and right hydronephrosis.

## 2020-10-15 NOTE — ED NOTES
Dietary called for tray. They will call before bringing tray to make sure pt has not gone upstairs.

## 2020-10-15 NOTE — ED PROVIDER NOTES
SCRIBE #1 NOTE: I, Amalia Little, am scribing for, and in the presence of, Florian Perscott MD. I have scribed the entire note.       History     Chief Complaint   Patient presents with    Wound Check     pressure wound on L leg being treated by wound care; states increased drainage and heat to area and new knot to lower abd     Review of patient's allergies indicates:   Allergen Reactions    Doxycycline Other (See Comments)     abd pain, bloating         History of Present Illness     HPI    10/15/2020, 10:13 AM  History obtained from the patient      History of Present Illness: Yan Pickering Jr. is a 80 y.o. male patient with a PMHx of A-fib, CHF, CKD, Mild mitral insufficiency, cardiomyopathy, nad moderate aortic insufficiency who presents to the Emergency Department for evaluation of a wound on L leg which has been treated yesterday at wound care but the drainage has not stopped until 4 AM. Pt's ex-wife was concerned if there is hernia on RLQ of abdomen. Symptoms are constant and moderate in severity. No mitigating or exacerbating factors reported. Associated sxs include increased drainage, wound is hot to touch,  RLQ abdominal pain, and fever (Tnow 99.8 F). Patient denies any SOB, CP, HA, n/v/d, cough, chills, and all other sxs at this time. No further complaints or concerns at this time.       Arrival mode: Personal vehicle     PCP: Brenden Issa MD        Past Medical History:  Past Medical History:   Diagnosis Date    Atrial fibrillation     Cardiomyopathy 4/26/2017    CHF (congestive heart failure)     CKD (chronic kidney disease) stage 3, GFR 30-59 ml/min     Mild mitral insufficiency     Moderate aortic insufficiency     Venous stasis dermatitis of both lower extremities        Past Surgical History:  History reviewed. No pertinent surgical history.      Family History:  Family History   Problem Relation Age of Onset    Heart disease Father        Social History:  Social History     Tobacco Use     Smoking status: Never Smoker    Smokeless tobacco: Never Used   Substance and Sexual Activity    Alcohol use: No    Drug use: No    Sexual activity: Not Currently        Review of Systems     Review of Systems   Constitutional: Positive for fever (Tnow 99.8 F). Negative for chills.   HENT: Negative for sore throat.    Respiratory: Negative for cough and shortness of breath.    Cardiovascular: Negative for chest pain.   Gastrointestinal: Positive for abdominal pain (RLQ). Negative for diarrhea, nausea and vomiting.   Genitourinary: Negative for dysuria.   Musculoskeletal: Negative for back pain.   Skin: Positive for wound (L leg with increased drainage). Negative for rash.        (+) wound is hot to touch   Neurological: Negative for weakness and headaches.   Hematological: Does not bruise/bleed easily.   All other systems reviewed and are negative.       Physical Exam     Initial Vitals [10/15/20 0908]   BP Pulse Resp Temp SpO2   (!) 94/46 110 20 99.8 °F (37.7 °C) 98 %      MAP       --          Physical Exam  Nursing Notes and Vital Signs Reviewed.  Constitutional: Patient is in mild distress. Poor historian.   Head: Atraumatic. Normocephalic.  Eyes: PERRL. EOM intact. Conjunctivae are not pale. No scleral icterus.  ENT: Mucous membranes are moist. Oropharynx is clear and symmetric.    Neck: Supple. Full ROM. No lymphadenopathy.  Cardiovascular: Tachycardic. Regular rhythm. No murmurs, rubs, or gallops. Distal pulses are 2+ and symmetric.  Pulmonary/Chest: No respiratory distress. Clear to auscultation bilaterally. No wheezing or rales.  Abdominal: Soft and non-distended. RLQ tenderness.  No rebound, guarding, or rigidity. Good bowel sounds.  Genitourinary: Bilateral inguinal hernia.  Musculoskeletal: Moves all extremities. No obvious deformities. No edema. No calf tenderness.  Skin: Dry. Calor on L upper part of lower leg.        Neurological:  Alert, awake, and appropriate.  Normal speech.  No acute focal  "neurological deficits are appreciated.  Psychiatric: Normal affect. Good eye contact. Appropriate in content.     ED Course   Procedures  ED Vital Signs:  Vitals:    10/15/20 1643 10/15/20 1700 10/15/20 1914 10/15/20 2053   BP: (!) 93/54  (!) 87/52 (!) 109/56   Pulse: 102 100 101 (!) 125   Resp: 20  18    Temp: 97.6 °F (36.4 °C)  98 °F (36.7 °C)    TempSrc: Oral  Oral    SpO2: (!) 94%  97%    Weight:       Height:        10/15/20 2100 10/15/20 2208 10/15/20 2300 10/16/20 0000   BP:    (!) 87/54   Pulse: 96  92 109   Resp:    16   Temp:    98.2 °F (36.8 °C)   TempSrc:    Oral   SpO2:    (!) 94%   Weight:  57.9 kg (127 lb 10.3 oz)     Height:        10/16/20 0056 10/16/20 0100 10/16/20 0300 10/16/20 0343   BP:    (!) 97/57   Pulse:  (!) 112 93 102   Resp:    16   Temp:    97 °F (36.1 °C)   TempSrc:    Oral   SpO2:    96%   Weight:       Height: 5' 6" (1.676 m)       10/16/20 0500 10/16/20 0600 10/16/20 0749   BP:   (!) 100/55   Pulse: 94  (!) 126   Resp:   18   Temp:   98.1 °F (36.7 °C)   TempSrc:   Oral   SpO2:   96%   Weight:  56.8 kg (125 lb 3.5 oz)    Height:          Abnormal Lab Results:  Labs Reviewed   CBC W/ AUTO DIFFERENTIAL - Abnormal; Notable for the following components:       Result Value    RBC 2.80 (*)     Hemoglobin 8.4 (*)     Hematocrit 27.5 (*)     Mean Corpuscular Hemoglobin Conc 30.5 (*)     RDW 16.1 (*)     Platelets 143 (*)     Immature Granulocytes 1.1 (*)     Gran # (ANC) 10.1 (*)     Immature Grans (Abs) 0.13 (*)     Lymph # 0.5 (*)     Mono # 1.3 (*)     Gran% 83.2 (*)     Lymph% 4.4 (*)     All other components within normal limits   COMPREHENSIVE METABOLIC PANEL - Abnormal; Notable for the following components:    BUN, Bld 56 (*)     Creatinine 2.6 (*)     Albumin 2.8 (*)     Total Bilirubin 1.1 (*)     eGFR if  25 (*)     eGFR if non  22 (*)     All other components within normal limits   APTT - Abnormal; Notable for the following components:    aPTT 38.8 " (*)     All other components within normal limits   PROTIME-INR - Abnormal; Notable for the following components:    Prothrombin Time 13.1 (*)     All other components within normal limits   B-TYPE NATRIURETIC PEPTIDE - Abnormal; Notable for the following components:    BNP 1,579 (*)     All other components within normal limits   PROCALCITONIN - Abnormal; Notable for the following components:    Procalcitonin 0.39 (*)     All other components within normal limits   LACTIC ACID, PLASMA   TROPONIN I   SARS-COV-2 RNA AMPLIFICATION, QUAL   URINALYSIS, REFLEX TO URINE CULTURE    Narrative:     Specimen Source->Urine   PROCALCITONIN        All Lab Results:  Results for orders placed or performed during the hospital encounter of 10/15/20   Blood culture #1    Specimen: Peripheral, Antecubital, Left; Blood   Result Value Ref Range    Blood Culture, Routine No Growth to date    Blood culture #2    Specimen: Peripheral, Antecubital, Right; Blood   Result Value Ref Range    Blood Culture, Routine No Growth to date    CBC auto differential   Result Value Ref Range    WBC 12.16 3.90 - 12.70 K/uL    RBC 2.80 (L) 4.60 - 6.20 M/uL    Hemoglobin 8.4 (L) 14.0 - 18.0 g/dL    Hematocrit 27.5 (L) 40.0 - 54.0 %    Mean Corpuscular Volume 98 82 - 98 fL    Mean Corpuscular Hemoglobin 30.0 27.0 - 31.0 pg    Mean Corpuscular Hemoglobin Conc 30.5 (L) 32.0 - 36.0 g/dL    RDW 16.1 (H) 11.5 - 14.5 %    Platelets 143 (L) 150 - 350 K/uL    MPV 10.1 9.2 - 12.9 fL    Immature Granulocytes 1.1 (H) 0.0 - 0.5 %    Gran # (ANC) 10.1 (H) 1.8 - 7.7 K/uL    Immature Grans (Abs) 0.13 (H) 0.00 - 0.04 K/uL    Lymph # 0.5 (L) 1.0 - 4.8 K/uL    Mono # 1.3 (H) 0.3 - 1.0 K/uL    Eos # 0.0 0.0 - 0.5 K/uL    Baso # 0.03 0.00 - 0.20 K/uL    nRBC 0 0 /100 WBC    Gran% 83.2 (H) 38.0 - 73.0 %    Lymph% 4.4 (L) 18.0 - 48.0 %    Mono% 10.9 4.0 - 15.0 %    Eosinophil% 0.2 0.0 - 8.0 %    Basophil% 0.2 0.0 - 1.9 %    Differential Method Automated    Comprehensive metabolic  panel   Result Value Ref Range    Sodium 137 136 - 145 mmol/L    Potassium 4.9 3.5 - 5.1 mmol/L    Chloride 102 95 - 110 mmol/L    CO2 24 23 - 29 mmol/L    Glucose 106 70 - 110 mg/dL    BUN, Bld 56 (H) 8 - 23 mg/dL    Creatinine 2.6 (H) 0.5 - 1.4 mg/dL    Calcium 8.8 8.7 - 10.5 mg/dL    Total Protein 6.6 6.0 - 8.4 g/dL    Albumin 2.8 (L) 3.5 - 5.2 g/dL    Total Bilirubin 1.1 (H) 0.1 - 1.0 mg/dL    Alkaline Phosphatase 71 55 - 135 U/L    AST 17 10 - 40 U/L    ALT 10 10 - 44 U/L    Anion Gap 11 8 - 16 mmol/L    eGFR if African American 25 (A) >60 mL/min/1.73 m^2    eGFR if non African American 22 (A) >60 mL/min/1.73 m^2   Lactic acid, plasma   Result Value Ref Range    Lactate (Lactic Acid) 1.0 0.5 - 2.2 mmol/L   APTT   Result Value Ref Range    aPTT 38.8 (H) 21.0 - 32.0 sec   Protime-INR   Result Value Ref Range    Prothrombin Time 13.1 (H) 9.0 - 12.5 sec    INR 1.2 0.8 - 1.2   Troponin I   Result Value Ref Range    Troponin I 0.011 0.000 - 0.026 ng/mL   B-Type natriuretic peptide (BNP)   Result Value Ref Range    BNP 1,579 (H) 0 - 99 pg/mL   COVID-19 Rapid Screening   Result Value Ref Range    SARS-CoV-2 RNA, Amplification, Qual Negative Negative   Urinalysis, Reflex to Urine Culture Urine, Clean Catch    Specimen: Urine   Result Value Ref Range    Specimen UA Urine, Clean Catch     Color, UA Yellow Yellow, Straw, Vero    Appearance, UA Clear Clear    pH, UA 6.0 5.0 - 8.0    Specific Gravity, UA 1.020 1.005 - 1.030    Protein, UA Negative Negative    Glucose, UA Negative Negative    Ketones, UA Negative Negative    Bilirubin (UA) Negative Negative    Occult Blood UA Negative Negative    Nitrite, UA Negative Negative    Urobilinogen, UA Negative <2.0 EU/dL    Leukocytes, UA Negative Negative   Procalcitonin   Result Value Ref Range    Procalcitonin 0.39 (H) <0.25 ng/mL   Magnesium   Result Value Ref Range    Magnesium 2.1 1.6 - 2.6 mg/dL         Imaging Results:  Imaging Results          CT Abdomen Pelvis  Without  Contrast (Final result)  Result time 10/15/20 13:01:28   Procedure changed from CT Abdomen Pelvis With Contrast / no oral contrast     Final result by Alber Ohara MD (10/15/20 13:01:28)                 Impression:      1.  There is been interval development of moderate right hydronephrosis and hydroureter in this patient who has a right pelvic kidney.  No obvious obstructing process is seen.  A recently passed stone could have this appearance.  A radiolucent obstructing process at the UVJ could cause this appearance.  This could also be related to the moderate bladder distention that it is again seen.  Urologic consultation should be considered.    2. Negative for acute process otherwise.  Negative for new inflammatory changes.  Normal appendix.  Negative for left hydronephrosis.    3.  Numerous stable findings as noted above, to include cardiac chamber enlargement with coronary artery calcifications, small effusions with adjacent atelectasis, multiple hepatic cysts, bilateral renal cysts measuring up to 6.5 cm, osteopenia with degenerative changes of the spine and pelvis, bilateral inguinal hernias with loops of bowel within the hernia sacs, but without obstruction, wide neck anterior bladder diverticulum and prostate enlargement with calcifications.    All CT scans at this facility are performed  using dose modulation techniques as appropriate to performed exam including the following:  automated exposure control; adjustment of mA and/or kV according to the patients size (this includes techniques or standardized protocols for targeted exams where dose is matched to indication/reason for exam: i.e. extremities or head);  iterative reconstruction technique.      Electronically signed by: Alber Ohara MD  Date:    10/15/2020  Time:    13:01             Narrative:    EXAMINATION:  CT ABDOMEN PELVIS WITHOUT CONTRAST, multiplanar reconstructions    CLINICAL HISTORY:  Bowel obstruction high-grade  suspected;    TECHNIQUE:  Axial images through the abdomen and pelvis were obtained without the use of IV contrast.  Sagittal and coronal reconstructions are provided for review.  Oral contrast was not utilized.    COMPARISON:  May 26, 2020    FINDINGS:  LUNG BASES: Tiny bilateral pleural effusions are again seen, with similar degree of adjacent scarring or atelectasis.  In the left greater than right lung bases.  Lung bases are free of new pulmonary opacities.  The distal esophagus is normal.  Cardiac chambers remain enlarged with coronary artery calcifications.  Stable small pericardial effusion.    ABDOMEN: The too numerous to count low-attenuation foci throughout the liver, measuring up to 1.8 cm, are similar in size in distribution.  Stable right pelvic kidney, with a 6.5 cm cyst in the inferior pole.  Stable cyst within the inferior right kidney measuring approximately 4 cm.    There is been interval development of moderate right hydronephrosis and hydroureter, which is quite tortuous.  No obvious obstructing process is seen.    The liver, spleen and gallbladder otherwise appear normal.  The pancreas is unremarkable.  Kidneys and adrenal glands are otherwise normal.    Negative for adenopathy or ascites noted within the abdomen or pelvis.  Vascular calcifications are present without aneurysmal changes.  Again seen is mild induration of the fat in the both pericolic gutters, nonspecific finding.    The bowel appears normal. Normal appendix.  Again seen are bilateral inguinal hernias, with loops of bowel within both hernia sacs, without obvious obstruction.  Negative for free air.    PELVIS: The urinary bladder is moderately distended, with stable wide-based anterior bladder diverticulum.  Prostate gland remains enlarged with calcifications.  The rest of the male pelvic organs are normal. There are pelvic phleboliths.    No significant osseous abnormality is identified.  Negative for significant spinal canal  stenosis. Stable osteopenia and mild degenerative changes of the spine and pelvis.  Convex left curvature of the lumbar spine again seen.    Negative for groin adenopathy.    The abdominal wall is intact.                               X-Ray Chest AP Portable (Final result)  Result time 10/15/20 10:40:56    Final result by Samson Mehta MD (10/15/20 10:40:56)                 Impression:      Subtle infiltrate right upper lobe.      Electronically signed by: Samson Mehta MD  Date:    10/15/2020  Time:    10:40             Narrative:    EXAMINATION:  XR CHEST AP PORTABLE    CLINICAL HISTORY:  Chest Pain;    FINDINGS:  Single view of the chest.  Comparison 05/26/2020    Cardiac silhouette is enlarged.  The lungs demonstrate no evidence of consolidation.  Subtle infiltrate right upper lobe.  Emphysematous changes are noted.  No evidence of significant pleural effusion or pneumothorax.  Bones appear intact.                                 The EKG was ordered, reviewed, and independently interpreted by the ED provider.  Interpretation time: 10:28  Rate: 120 BPM  Rhythm: atrial fibrillation  Interpretation: Nonspecific ST/T wave abnormalities. No STEMI.           The Emergency Provider reviewed the vital signs and test results, which are outlined above.     ED Discussion       2:17 PM: Discussed case with Robina Arthur NP (Hospital Medicine). Dr. Romero agrees with current care and management of pt and accepts admission.   Admitting Service: Hospital Medicine  Admitting Physician: Dr. Romero  Admit to: Obs    2:18 PM: Re-evaluated pt. I have discussed test results, shared treatment plan, and the need for admission with patient and family at bedside. Pt and family express understanding at this time and agree with all information. All questions answered. Pt and family have no further questions or concerns at this time. Pt is ready for admit.             Medical Decision Making:   Clinical Tests:   Lab Tests: Ordered and  Reviewed  Radiological Study: Ordered and Reviewed  Medical Tests: Ordered and Reviewed           ED Medication(s):  Medications   vancomycin - pharmacy to dose (has no administration in time range)   apixaban tablet 2.5 mg (2.5 mg Oral Given 10/15/20 2052)   losartan tablet 25 mg (has no administration in time range)   metoprolol tartrate (LOPRESSOR) tablet 100 mg (100 mg Oral Given 10/15/20 2059)   sodium chloride 0.9% flush 10 mL (has no administration in time range)   pneumoc 13-heidy conj-dip cr(PF) (PREVNAR 13 (PF)) 0.5 mL (has no administration in time range)   influenza (QUADRIVALENT ADJUVANTED PF) vaccine 0.5 mL (has no administration in time range)   glucose chewable tablet 16 g (has no administration in time range)   glucose chewable tablet 24 g (has no administration in time range)   dextrose 50% injection 12.5 g (has no administration in time range)   dextrose 50% injection 25 g (has no administration in time range)   glucagon (human recombinant) injection 1 mg (has no administration in time range)   acetaminophen tablet 650 mg (has no administration in time range)   ondansetron injection 4 mg (has no administration in time range)   furosemide injection 80 mg (80 mg Intravenous Given 10/15/20 2201)   sodium chloride 0.9% bolus 500 mL (0 mLs Intravenous Stopped 10/15/20 1216)   piperacillin-tazobactam 4.5 g in dextrose 5 % 100 mL IVPB (ready to mix system) (0 g Intravenous Stopped 10/15/20 1339)   sodium chloride 0.9% bolus 1,000 mL (0 mLs Intravenous Stopped 10/15/20 1358)   vancomycin 1.25 g in dextrose 5% 250 mL IVPB (ready to mix) (0 mg Intravenous Stopped 10/15/20 1521)   0.9%  NaCl infusion (1,000 mLs Intravenous New Bag 10/15/20 1514)       Current Discharge Medication List                  Scribe Attestation:   Scribe #1: I performed the above scribed service and the documentation accurately describes the services I performed. I attest to the accuracy of the note.     Attending:   Physician  Attestation Statement for Scribe #1: I, Florian Prescott MD, personally performed the services described in this documentation, as scribed by Amalia Little, in my presence, and it is both accurate and complete.           Clinical Impression       ICD-10-CM ICD-9-CM   1. Urinary retention  R33.9 788.20   2. Abdominal pain  R10.9 789.00   3. CHF (congestive heart failure)  I50.9 428.0   4. Leg swelling  M79.89 729.81   5. Acute on chronic combined systolic and diastolic CHF (congestive heart failure)  I50.43 428.43     428.0   6. Abdominal pain, unspecified abdominal location  R10.9 789.00       Disposition:   Disposition: Placed in Observation  Condition: Fair         Florian Prescott MD  10/16/20 0831

## 2020-10-15 NOTE — CONSULTS
Pharmacokinetic Initial Assessment: IV Vancomycin    Assessment/Plan:    Initiate intravenous vancomycin with loading dose of 1250 mg once with subsequent doses when random concentrations are less than 20 mcg/mL  Desired empiric serum trough concentration is 15 to 20 mcg/mL  Draw vancomycin random level on 10/16/2020 at 0430.  Pharmacy will continue to follow and monitor vancomycin.      Please contact pharmacy at extension [phone] with any questions regarding this assessment.     Thank you for the consult,   Lula Zelaya       Patient brief summary:  Yan Pickering Jr. is a 80 y.o. male initiated on antimicrobial therapy with IV Vancomycin for treatment of suspected sepsis    Drug Allergies:   Review of patient's allergies indicates:   Allergen Reactions    Doxycycline Other (See Comments)     abd pain, bloating       Actual Body Weight:   57.9 Kg     Renal Function:   Estimated Creatinine Clearance: 17.9 mL/min (A) (based on SCr of 2.6 mg/dL (H)).,     Dialysis Method (if applicable):  N/A    CBC (last 72 hours):  Recent Labs   Lab Result Units 10/15/20  1040   WBC K/uL 12.16   Hemoglobin g/dL 8.4*   Hematocrit % 27.5*   Platelets K/uL 143*   Gran% % 83.2*   Lymph% % 4.4*   Mono% % 10.9   Eosinophil% % 0.2   Basophil% % 0.2   Differential Method  Automated       Metabolic Panel (last 72 hours):  Recent Labs   Lab Result Units 10/15/20  1040   Sodium mmol/L 137   Potassium mmol/L 4.9   Chloride mmol/L 102   CO2 mmol/L 24   Glucose mg/dL 106   BUN, Bld mg/dL 56*   Creatinine mg/dL 2.6*   Albumin g/dL 2.8*   Total Bilirubin mg/dL 1.1*   Alkaline Phosphatase U/L 71   AST U/L 17   ALT U/L 10       Drug levels (last 3 results):  No results for input(s): VANCOMYCINRA, VANCOMYCINPE, VANCOMYCINTR in the last 72 hours.    Microbiologic Results:  Microbiology Results (last 7 days)       Procedure Component Value Units Date/Time    Blood culture #2 [[id number]] Collected: 10/15/20 1055    Order Status: Sent Specimen:  Blood from Peripheral, Antecubital, Right Updated: 10/15/20 1138    Blood culture #1 [[id number]] Collected: 10/15/20 1040    Order Status: Sent Specimen: Blood from Peripheral, Antecubital, Left Updated: 10/15/20 1107

## 2020-10-15 NOTE — ED NOTES
"RN to bedside to transport pt to room 242.  Pt's family member states, "He's eating," transport refused.  Will attempt transport again after pt finishes meal.   "

## 2020-10-15 NOTE — ED NOTES
New urinal provided to pt. Pt notified of need for urine sample and verbalizes understanding to call for collection.     Chikis (caregiver) to be called with any updates or if pt is moved to another room/upstairs.   [phone]

## 2020-10-16 ENCOUNTER — DOCUMENTATION ONLY (OUTPATIENT)
Dept: CARDIOLOGY | Facility: CLINIC | Age: 80
End: 2020-10-16

## 2020-10-16 PROBLEM — R78.81 BACTEREMIA: Status: ACTIVE | Noted: 2020-10-16

## 2020-10-16 PROBLEM — E44.0 MODERATE MALNUTRITION: Status: ACTIVE | Noted: 2020-10-16

## 2020-10-16 LAB
ANION GAP SERPL CALC-SCNC: 12 MMOL/L (ref 8–16)
AORTIC ROOT ANNULUS: 3.63 CM
ASCENDING AORTA: 4.51 CM
BSA FOR ECHO PROCEDURE: 1.62 M2
BUN SERPL-MCNC: 45 MG/DL (ref 8–23)
CALCIUM SERPL-MCNC: 8.4 MG/DL (ref 8.7–10.5)
CHLORIDE SERPL-SCNC: 102 MMOL/L (ref 95–110)
CO2 SERPL-SCNC: 23 MMOL/L (ref 23–29)
CREAT SERPL-MCNC: 2.1 MG/DL (ref 0.5–1.4)
CV ECHO LV RWT: 0.46 CM
DOP CALC LVOT AREA: 3 CM2
DOP CALC LVOT DIAMETER: 1.96 CM
ECHO LV POSTERIOR WALL: 1.03 CM (ref 0.6–1.1)
EST. GFR  (AFRICAN AMERICAN): 33 ML/MIN/1.73 M^2
EST. GFR  (NON AFRICAN AMERICAN): 28 ML/MIN/1.73 M^2
FRACTIONAL SHORTENING: 39 % (ref 28–44)
GLUCOSE SERPL-MCNC: 78 MG/DL (ref 70–110)
INTERVENTRICULAR SEPTUM: 0.7 CM (ref 0.6–1.1)
LA MAJOR: 6.68 CM
LA MINOR: 6.1 CM
LA WIDTH: 4.5 CM
LEFT ATRIUM SIZE: 6.44 CM
LEFT ATRIUM VOLUME INDEX: 95.9 ML/M2
LEFT ATRIUM VOLUME: 157.08 CM3
LEFT INTERNAL DIMENSION IN SYSTOLE: 2.76 CM (ref 2.1–4)
LEFT VENTRICLE DIASTOLIC VOLUME INDEX: 56.42 ML/M2
LEFT VENTRICLE DIASTOLIC VOLUME: 92.42 ML
LEFT VENTRICLE MASS INDEX: 77 G/M2
LEFT VENTRICLE SYSTOLIC VOLUME INDEX: 17.3 ML/M2
LEFT VENTRICLE SYSTOLIC VOLUME: 28.4 ML
LEFT VENTRICULAR INTERNAL DIMENSION IN DIASTOLE: 4.5 CM (ref 3.5–6)
LEFT VENTRICULAR MASS: 125.97 G
PISA MRMAX VEL: 0.04 M/S
PISA TR MAX VEL: 3.5 M/S
POTASSIUM SERPL-SCNC: 4.2 MMOL/L (ref 3.5–5.1)
RA MAJOR: 6.14 CM
RA PRESSURE: 8 MMHG
RA WIDTH: 5.65 CM
SODIUM SERPL-SCNC: 137 MMOL/L (ref 136–145)
STJ: 3.7 CM
TDI LATERAL: 0.19 M/S
TDI SEPTAL: 0.11 M/S
TDI: 0.15 M/S
TR MAX PG: 49 MMHG
TRICUSPID ANNULAR PLANE SYSTOLIC EXCURSION: 2.3 CM
TV REST PULMONARY ARTERY PRESSURE: 57 MMHG
VANCOMYCIN SERPL-MCNC: 14.3 UG/ML

## 2020-10-16 PROCEDURE — 97802 MEDICAL NUTRITION INDIV IN: CPT | Mod: HCNC

## 2020-10-16 PROCEDURE — 63600175 PHARM REV CODE 636 W HCPCS: Mod: HCNC | Performed by: NURSE PRACTITIONER

## 2020-10-16 PROCEDURE — 25000003 PHARM REV CODE 250: Mod: HCNC | Performed by: NURSE PRACTITIONER

## 2020-10-16 PROCEDURE — 36415 COLL VENOUS BLD VENIPUNCTURE: CPT | Mod: HCNC

## 2020-10-16 PROCEDURE — 96376 TX/PRO/DX INJ SAME DRUG ADON: CPT

## 2020-10-16 PROCEDURE — 21400001 HC TELEMETRY ROOM: Mod: HCNC

## 2020-10-16 PROCEDURE — 99231 SBSQ HOSP IP/OBS SF/LOW 25: CPT | Mod: HCNC,,, | Performed by: UROLOGY

## 2020-10-16 PROCEDURE — 80048 BASIC METABOLIC PNL TOTAL CA: CPT | Mod: HCNC

## 2020-10-16 PROCEDURE — 99231 PR SUBSEQUENT HOSPITAL CARE,LEVL I: ICD-10-PCS | Mod: HCNC,,, | Performed by: UROLOGY

## 2020-10-16 PROCEDURE — 51702 INSERT TEMP BLADDER CATH: CPT | Mod: HCNC

## 2020-10-16 PROCEDURE — 80202 ASSAY OF VANCOMYCIN: CPT | Mod: HCNC

## 2020-10-16 RX ORDER — METOPROLOL TARTRATE 1 MG/ML
5 INJECTION, SOLUTION INTRAVENOUS ONCE
Status: DISCONTINUED | OUTPATIENT
Start: 2020-10-16 | End: 2020-10-17 | Stop reason: HOSPADM

## 2020-10-16 RX ADMIN — APIXABAN 2.5 MG: 2.5 TABLET, FILM COATED ORAL at 08:10

## 2020-10-16 RX ADMIN — APIXABAN 2.5 MG: 2.5 TABLET, FILM COATED ORAL at 09:10

## 2020-10-16 RX ADMIN — LOSARTAN POTASSIUM 25 MG: 25 TABLET ORAL at 08:10

## 2020-10-16 RX ADMIN — FUROSEMIDE 80 MG: 10 INJECTION, SOLUTION INTRAMUSCULAR; INTRAVENOUS at 08:10

## 2020-10-16 RX ADMIN — METOPROLOL TARTRATE 100 MG: 50 TABLET, FILM COATED ORAL at 08:10

## 2020-10-16 RX ADMIN — METOPROLOL TARTRATE 100 MG: 50 TABLET, FILM COATED ORAL at 09:10

## 2020-10-16 NOTE — HPI
80-year-old male referred for bilateral inguinal hernias.  These are reported to be chronic inguinal hernias.  The patient has no pain associated with these hernias.  He was noted to have bilateral inguinal hernias on CT scan.  He is being admitted for CHF and urinary retention/hydronephrosis.

## 2020-10-16 NOTE — ASSESSMENT & PLAN NOTE
Nutrition Problem:  Moderate Protein-Calorie Malnutrition  Malnutrition in the context of Chronic Illness/Injury    Related to (etiology):  Physiological causes increasing nutrient needs due to illness or condition    Signs and Symptoms (as evidenced by):  Body Fat Depletion: moderate depletion of orbitals and triceps   Muscle Mass Depletion: moderate and severe depletion of temples, clavicle region and scapular region     Interventions(treatment strategy):  Collaboration with other providers  Nutrition education content- CHF diet     Nutrition Diagnosis Status:  New

## 2020-10-16 NOTE — SUBJECTIVE & OBJECTIVE
No current facility-administered medications on file prior to encounter.      Current Outpatient Medications on File Prior to Encounter   Medication Sig    apixaban (ELIQUIS) 2.5 mg Tab Take 1 tablet (2.5 mg total) by mouth 2 (two) times daily.    losartan (COZAAR) 25 MG tablet Take 1 tablet (25 mg total) by mouth once daily.    metoprolol tartrate (LOPRESSOR) 100 MG tablet Take 1 tablet (100 mg total) by mouth 2 (two) times daily.    furosemide (LASIX) 40 MG tablet Take 1 tablet (40 mg total) by mouth daily as needed (swelling).    simethicone (MYLICON) 125 mg Cap capsule Take 125 mg by mouth 4 (four) times daily as needed.    [DISCONTINUED] docusate sodium (COLACE) 100 MG capsule Take 1 capsule (100 mg total) by mouth 2 (two) times daily.    [DISCONTINUED] polyethylene glycol (GLYCOLAX) 17 gram/dose powder Take 17 g by mouth once daily.       Review of patient's allergies indicates:   Allergen Reactions    Doxycycline Other (See Comments)     abd pain, bloating       Past Medical History:   Diagnosis Date    Atrial fibrillation     Cardiomyopathy 4/26/2017    CHF (congestive heart failure)     CKD (chronic kidney disease) stage 3, GFR 30-59 ml/min     Mild mitral insufficiency     Moderate aortic insufficiency     Venous stasis dermatitis of both lower extremities      History reviewed. No pertinent surgical history.  Family History     Problem Relation (Age of Onset)    Heart disease Father        Tobacco Use    Smoking status: Never Smoker    Smokeless tobacco: Never Used   Substance and Sexual Activity    Alcohol use: No    Drug use: No    Sexual activity: Not Currently     Review of Systems   Constitutional: Positive for activity change and fatigue. Negative for chills, fever and unexpected weight change.   HENT: Negative for congestion.    Eyes: Negative for visual disturbance.   Respiratory: Negative for shortness of breath.    Cardiovascular: Negative for chest pain.   Gastrointestinal:  Negative for abdominal distention, abdominal pain, constipation, nausea, rectal pain and vomiting.   Genitourinary: Positive for difficulty urinating. Negative for dysuria.   Musculoskeletal: Negative for arthralgias.   Skin: Positive for wound. Negative for rash.   Neurological: Negative for light-headedness.   Hematological: Negative for adenopathy.     Objective:     Vital Signs (Most Recent):  Temp: 98 °F (36.7 °C) (10/15/20 1914)  Pulse: 101 (10/15/20 1914)  Resp: 18 (10/15/20 1914)  BP: (!) 87/52 (10/15/20 1914)  SpO2: 97 % (10/15/20 1914) Vital Signs (24h Range):  Temp:  [97.4 °F (36.3 °C)-100.1 °F (37.8 °C)] 98 °F (36.7 °C)  Pulse:  [100-115] 101  Resp:  [17-21] 18  SpO2:  [94 %-99 %] 97 %  BP: (85-96)/(46-61) 87/52     Weight: 57.9 kg (127 lb 10.3 oz)  Body mass index is 18.85 kg/m².    Physical Exam  Vitals signs reviewed.   Constitutional:       Appearance: He is well-developed.   HENT:      Head: Normocephalic and atraumatic.   Neck:      Musculoskeletal: Normal range of motion and neck supple.   Cardiovascular:      Rate and Rhythm: Normal rate.   Pulmonary:      Effort: Pulmonary effort is normal.   Abdominal:      General: Bowel sounds are normal. There is no distension.      Palpations: Abdomen is soft.      Tenderness: There is no abdominal tenderness.      Hernia: A hernia (Bilateral inguinal hernias easily reducible, nontender) is present.   Neurological:      Mental Status: He is alert.         Significant Labs:  CBC:   Recent Labs   Lab 10/15/20  1040   WBC 12.16   RBC 2.80*   HGB 8.4*   HCT 27.5*   *   MCV 98   MCH 30.0   MCHC 30.5*     BMP:   Recent Labs   Lab 10/15/20  1040         K 4.9      CO2 24   BUN 56*   CREATININE 2.6*   CALCIUM 8.8   MG 2.1       Significant Diagnostics:  CT:  Impression:     1.  There is been interval development of moderate right hydronephrosis and hydroureter in this patient who has a right pelvic kidney.  No obvious obstructing process  is seen.  A recently passed stone could have this appearance.  A radiolucent obstructing process at the UVJ could cause this appearance.  This could also be related to the moderate bladder distention that it is again seen.  Urologic consultation should be considered.     2. Negative for acute process otherwise.  Negative for new inflammatory changes.  Normal appendix.  Negative for left hydronephrosis.     3.  Numerous stable findings as noted above, to include cardiac chamber enlargement with coronary artery calcifications, small effusions with adjacent atelectasis, multiple hepatic cysts, bilateral renal cysts measuring up to 6.5 cm, osteopenia with degenerative changes of the spine and pelvis, bilateral inguinal hernias with loops of bowel within the hernia sacs, but without obstruction, wide neck anterior bladder diverticulum and prostate enlargement with calcifications.

## 2020-10-16 NOTE — PLAN OF CARE
Swer met with pt and pt's ex-wife Chikis at bedside for initial assessment. Pt lives with Chikis and was independent with ADLs prior to admission. Chikis will provide transportation at discharge. Pt denied using any medical equipment. Pt reported having [facility] and [facility] home health in the past. Pt does not have a problem obtaining medication and Chikis drives to medical appointments. Pt does not have an advanced directive at this time. SWer provided a transitional care folder, information on advanced directives, information on pharmacy bedside delivery, and discharge planning begins on admission with contact information for any needs/questions.    PCP; Brenden Issa MD  Pharm;   [facility] [address] [facility] [place] - [address] [facility] [address]  [address] [facility] [facility] [address]  Phone: [phone] Fax: [phone]  MyChart; Declined  Bedside Delivery; Yes       10/16/20 1155   Discharge Assessment   Assessment Type Discharge Planning Assessment   Confirmed/corrected address and phone number on facesheet? Yes   Assessment information obtained from? Caregiver;Patient   Communicated expected length of stay with patient/caregiver yes   Prior to hospitilization cognitive status: Alert/Oriented   Prior to hospitalization functional status: Independent   Current cognitive status: Alert/Oriented   Current Functional Status: Independent   Facility Arrived From: home   Lives With other (see comments)  (Ex-Wife)   Able to Return to Prior Arrangements yes   Is patient able to care for self after discharge? Yes   Who are your caregiver(s) and their phone number(s)? Chikis Fuller (Ex-Wife) [phone]   Patient's perception of discharge disposition home or selfcare   Readmission Within the Last 30 Days no previous admission in last 30 days   Patient currently being followed by outpatient case management? No   Patient currently receives any other outside agency services? No   Equipment Currently Used at Home none   Do  you have any problems affording any of your prescribed medications? No   Is the patient taking medications as prescribed? yes   Does the patient have transportation home? Yes   Transportation Anticipated family or friend will provide   Discharge Plan A Home with family   DME Needed Upon Discharge  none   Patient/Family in Agreement with Plan yes

## 2020-10-16 NOTE — CONSULTS
"  [facility] -   Adult Nutrition  Consult Note    SUMMARY     Recommendations    Recommendation:   1. Continue on low sodium, fluid restricted diet  2. RD provides diet education and follow up     Goals: Pt to achieve >75% intake of low sodium 1500mL fluid restricted diet by RD f/u    Nutrition Goal Status: new  Communication of RD Recs: POC    Reason for Assessment    Reason For Assessment: consult  Diagnosis: cardiac disease (CHF)  Relevant Medical History: CKD, CHF    General Information Comments: Pt presents with congestive heart failure. RD was consulted for diet education, but pt was also flagged by the malnutrition screen due to decreased appetite and possible weight loss (pt unsure). At assessment, pt says that he has consistently good oral intake. RD provided CHF diet education and urged pt to limit fluid and choose water instead of sodas. NFPE performed and he meets malnutrition criteria, documented in greater detail below.      Nutrition Discharge Planning: cardiac diet, 2 gm NA, 1500 mL fluid    Nutrition Risk Screen    Nutrition Risk Screen: no indicators present    Nutrition/Diet History    Food Preferences: Pt likes root beer  Food Allergies: NKFA  Factors Affecting Nutritional Intake: chewing difficulties/inability to chew food    Anthropometrics    Temp: 98 °F (36.7 °C)  Height: 5' 6" (167.6 cm)  Height (inches): 66 in  Weight Method: Bed Scale  Weight: 56.7 kg (125 lb)  Weight (lb): 125 lb  Ideal Body Weight (IBW), Male: 142 lb  % Ideal Body Weight, Male (lb): 88.03 %  BMI (Calculated): 20.2  BMI Grade: 18.5-24.9 - normal     Lab/Procedures/Meds  Pertinent Labs: reviewed  Albumin 2.8, eGFR 28, BUN 45, Cr 2.1   Pertinent Medications: reviewed  Lasix, lopressor    Physical Findings/Assessment     venous ulcer left lower leg    Estimated/Assessed Needs    Weight Used For Calorie Calculations: 56.8 kg (125 lb 3.5 oz)  Energy Calorie Requirements (kcal): 1370  Energy Need Method: " Kcal/kg(24)  Protein Requirements: 45g(.8g/kg per CKD)  Weight Used For Protein Calculations: 56.7 kg (125 lb)  Fluid Requirements (mL): 1.5 L(Per MD)  Estimated Fluid Requirement Method: RDA Method  RDA Method (mL): 1370  CHO Requirement: 170 g    Nutrition Prescription Ordered    Current Diet Order: Low sodium, fluid restricted    Evaluation of Received Nutrient/Fluid Intake       Intake/Output Summary (Last 24 hours) at 10/16/2020 1421  Last data filed at 10/16/2020 0900  Gross per 24 hour   Intake 490 ml   Output 3675 ml   Net -3185 ml     % Intake of Estimated Energy Needs: 75 - 100 %  % Meal Intake: 75 - 100 %    Nutrition Risk    2x week    Assessment and Plan    Moderate malnutrition  Nutrition Problem:  Moderate Protein-Calorie Malnutrition  Malnutrition in the context of Chronic Illness/Injury    Related to (etiology):  Physiological causes increasing nutrient needs due to illness or condition    Signs and Symptoms (as evidenced by):  Body Fat Depletion: moderate depletion of orbitals and triceps   Muscle Mass Depletion: moderate and severe depletion of temples, clavicle region and scapular region     Interventions(treatment strategy):  Collaboration with other providers  Nutrition education content- CHF diet     Nutrition Diagnosis Status:  New    Monitor and Evaluation    Food and Nutrient Intake: energy intake  Food and Nutrient Adminstration: diet order  Anthropometric Measurements: weight  Biochemical Data, Medical Tests and Procedures: electrolyte and renal panel, gastrointestinal profile, glucose/endocrine profile, inflammatory profile, lipid profile  Nutrition-Focused Physical Findings: extremities, muscles and bones     Malnutrition Assessment     Skin (Micronutrient): thinned  Hair/Scalp (Micronutrient): fine           Orbital Region (Subcutaneous Fat Loss): moderate depletion   Clavicle Bone Region (Muscle Loss): moderate depletion  Scapular Bone Region (Muscle Loss): severe depletion                Nutrition Follow-Up    RD Follow-up?: Yes    Donna Valverde Dietetic Intern

## 2020-10-16 NOTE — ASSESSMENT & PLAN NOTE
Chronic bilateral inguinal hernias currently asymptomatic with no evidence of obstruction, no signs or symptoms of incarceration or strangulation.   No surgical indications at this time  Patient can follow up as an outpatient for further discussion/recommendations  Will sign off please call with questions

## 2020-10-16 NOTE — ASSESSMENT & PLAN NOTE
10/15/20 Creatinine 2.6. (Baseline 2.5-2.9)  -monitor labs  -avoid nephrotoxic labs    10/16  Cr 2.1  Monitor

## 2020-10-16 NOTE — PLAN OF CARE
"Plan of care reviewed with patient, pt verbalized understanding.  Pt remains free from falls this shift, fall precautions in place.bed alarm set.  Pt remains free from skin breakdown, pt educated on the importance of frequent weight shift to decrease the risk of pressure injury. Pt verbalized understanding. Turn q2hr orders on  AAOx4,NAD noted at this time.  BP (!) 87/54   Pulse 109   Temp 98.2 °F (36.8 °C) (Oral)   Resp 16   Ht 5' 6" (1.676 m)   Wt 57.9 kg (127 lb 10.3 oz)   SpO2 (!) 94%   BMI 20.60 kg/m²    PIV 20 G to L AC , Saline locked  Pt remained afebrile.  A fib on the tele monitor   Pt admitted for abd pain. Weeping edema and redness to RLE. Gen surg consulted for umbilical hernia. Bustamante placed per Urology for retention.   Pt currently resting comfortably in bed.  Hourly rounding complete. Bed in lowest position, side rails up, call light in reach.  Will continue to monitor.  Problem: Skin Injury Risk Increased  Goal: Skin Health and Integrity  Outcome: Ongoing, Progressing     Problem: Adult Inpatient Plan of Care  Goal: Plan of Care Review  Outcome: Ongoing, Progressing     Problem: Wound  Goal: Optimal Wound Healing  Outcome: Ongoing, Progressing     "

## 2020-10-16 NOTE — PLAN OF CARE
Recommendations    Recommendation:   1. Continue on low sodium, fluid restricted diet  2. RD provides diet education and follow up     Goals: Pt to achieve >75% intake of low sodium 1500mL fluid restricted diet by RD f/u    Nutrition Goal Status: new  Communication of RD Recs: POC

## 2020-10-16 NOTE — SUBJECTIVE & OBJECTIVE
Interval History: Improving. No sx intervention indicated.     Review of Systems   Constitutional: Positive for activity change and fatigue. Negative for appetite change and fever.   HENT: Negative.  Negative for congestion, sinus pressure and sore throat.    Eyes: Negative.  Negative for photophobia, discharge and visual disturbance.   Respiratory: Negative.  Negative for cough, chest tightness, shortness of breath and wheezing.    Cardiovascular: Negative.  Negative for chest pain and palpitations.   Gastrointestinal: Negative.  Negative for abdominal pain, blood in stool, constipation, diarrhea, nausea and vomiting.   Endocrine: Negative.    Genitourinary: Negative.    Musculoskeletal: Negative.  Negative for myalgias, neck pain and neck stiffness.   Skin: Negative.    Allergic/Immunologic: Negative.    Neurological: Positive for weakness. Negative for seizures, syncope and headaches.   Hematological: Negative.    Psychiatric/Behavioral: Negative.  Negative for agitation, behavioral problems, hallucinations, self-injury and suicidal ideas.     Objective:     Vital Signs (Most Recent):  Temp: 98 °F (36.7 °C) (10/16/20 1118)  Pulse: (!) 120 (10/16/20 1618)  Resp: 18 (10/16/20 1618)  BP: (!) 103/57 (10/16/20 1618)  SpO2: 97 % (10/16/20 1618) Vital Signs (24h Range):  Temp:  [97 °F (36.1 °C)-98.2 °F (36.8 °C)] 98 °F (36.7 °C)  Pulse:  [] 120  Resp:  [16-18] 18  SpO2:  [94 %-97 %] 97 %  BP: ()/(52-68) 103/57     Weight: 56.7 kg (125 lb)  Body mass index is 20.18 kg/m².    Intake/Output Summary (Last 24 hours) at 10/16/2020 1832  Last data filed at 10/16/2020 0900  Gross per 24 hour   Intake 240 ml   Output 3675 ml   Net -3435 ml      Physical Exam  Vitals signs and nursing note reviewed.   Constitutional:       Appearance: He is well-developed.   HENT:      Head: Normocephalic and atraumatic.   Eyes:      Pupils: Pupils are equal, round, and reactive to light.   Neck:      Musculoskeletal: Normal range of  motion and neck supple.   Cardiovascular:      Rate and Rhythm: Normal rate and regular rhythm.      Heart sounds: Normal heart sounds.   Pulmonary:      Effort: Pulmonary effort is normal. No respiratory distress.      Breath sounds: Normal breath sounds. No wheezing.   Abdominal:      General: Bowel sounds are normal.      Palpations: Abdomen is soft.   Musculoskeletal: Normal range of motion.         General: No deformity.      Comments: + Inguinal hernias   Skin:     General: Skin is warm and dry.      Capillary Refill: Capillary refill takes 2 to 3 seconds.   Neurological:      Mental Status: He is alert and oriented to person, place, and time.      Deep Tendon Reflexes: Reflexes are normal and symmetric.   Psychiatric:         Behavior: Behavior normal.         Thought Content: Thought content normal.         Judgment: Judgment normal.         Significant Labs:   BMP:   Recent Labs   Lab 10/15/20  1040 10/16/20  0647    78    137   K 4.9 4.2    102   CO2 24 23   BUN 56* 45*   CREATININE 2.6* 2.1*   CALCIUM 8.8 8.4*   MG 2.1  --      CBC:   Recent Labs   Lab 10/15/20  1040   WBC 12.16   HGB 8.4*   HCT 27.5*   *     CMP:   Recent Labs   Lab 10/15/20  1040 10/16/20  0647    137   K 4.9 4.2    102   CO2 24 23    78   BUN 56* 45*   CREATININE 2.6* 2.1*   CALCIUM 8.8 8.4*   PROT 6.6  --    ALBUMIN 2.8*  --    BILITOT 1.1*  --    ALKPHOS 71  --    AST 17  --    ALT 10  --    ANIONGAP 11 12   EGFRNONAA 22* 28*     Troponin:   Recent Labs   Lab 10/15/20  1040   TROPONINI 0.011     Urine Studies:   Recent Labs   Lab 10/15/20  1433   COLORU Yellow   APPEARANCEUA Clear   PHUR 6.0   SPECGRAV 1.020   PROTEINUA Negative   GLUCUA Negative   KETONESU Negative   BILIRUBINUA Negative   OCCULTUA Negative   NITRITE Negative   UROBILINOGEN Negative   LEUKOCYTESUR Negative     All pertinent labs within the past 24 hours have been reviewed.    Significant Imaging: I have reviewed all  pertinent imaging results/findings within the past 24 hours.

## 2020-10-16 NOTE — PROGRESS NOTES
Discussed case with Dr. Jeffrey. Pt will follow up after hospital discharge with Sharon Carrion NP and chf 48 hour call will be scheduled.

## 2020-10-16 NOTE — CONSULTS
"Consulted on this 81 y/o M patient due to present on admission wounds to E. Patient admitted due to hernias and CHF. He c/o irritation to ears from mask. On assessment, blanchable redness is noted to bilateral ears from mask elastic. Painted with cavilon, foam dressing applied to left ear, then foam dressings also applied to mask elastic straps for cushion when wearing. Encouraged patient to remove mask when no one is in room.   Sacrum, coccyx, bilateral buttocks assessed with no redness or breakdown noted. pateint will benefit from waffle mattress overlay for prevention.  Bilateral heels intact with no redness. hemosidering staining and dry flaky skin noted to BLE. Multiple full thickness venous ulcers noted to E, largest measures 4x3x0.3cm. slough to wound beds noted. Cleansed all with saline and patted dry. Per report, legs were weeping moderate to large amount serous drainage on admit, family member states she had to remove his leg wrappings because they were saturated. She states he goes to wound care Wednesdays for compression wraps, however on last visit, the wound care clinic referred him to vascular surgeon for "blood flow" evaluation and stopped compression therapy. Encouraged patient to reschedule vascular appointment after discharge from hospital. At this time, sween cream applied to intact skin, adaptic applied to cover ulcers, topped with aquacel nonadherent foam, wrapped with kerlix and ACE. toelrated care well, recommend change dressing every MWF and prn excess drainage. Will follow.   "

## 2020-10-16 NOTE — HOSPITAL COURSE
10/16 - Patient EF 45%, Per surgery no intervention indicated for Hernia - f/u as o/p. Lower ext Chronic venous stasis derm stable. Patient with a positive BC possible contaminant. Await delineation of organism.. Discussed with patient and POA at bedside. No further complaint provided.  Patient 80 yomale presenting to hospital with c/o Lower ext weeping wound. Patient evaluated and determined to have chronic venous stasis. Patient with G+ cocci bacteremia POA. Discussed with ID who recommended following patient and repeat cultures as this is a likely contaminant. Patient evaluated by Urology for hydronephrosis. Maharaj placed with significant retention identified. Patient to d/c with maharaj and follow up with Urology as o/p. Patient CHF compensated and stable. Patient inguinal hernias evaluated by Surgery and recommended for O/P follow up. Patient deemed appropriate for discharge on rounds today and discharged to home with PCP and consulting followups as listed.

## 2020-10-16 NOTE — PLAN OF CARE
Patient admitted this shift from ED. POC reviewed with patient and patient's caregiver at bedside,both verbalized understanding. AAOX4, VSS, afib on tele. PIV intact. Room air. No c/o pain. Turn q2h with wedge. Weeping edema noted to bilat LE; blue pads loosely wrapped around each. Free from falls; safety precautions in place. Hourly rounding complete.

## 2020-10-16 NOTE — PROGRESS NOTES
Pharmacokinetic Assessment Follow Up and Sign Off: IV Vancomycin    Vancomycin serum concentration assessment(s):    The random level was drawn correctly and can be used to guide therapy at this time. The measurement is within the desired definitive target of less than 20 mcg/mL.    Vancomycin Regimen Plan:    Permission from Dr. Hoffmann to DC Vancomycin consult.     Drug levels (last 3 results):  Recent Labs   Lab Result Units 10/16/20  0647   Vancomycin, Random ug/mL 14.3     Therapy with Vancomycin complete and/or consult discontinued by provider.  Pharmacy will sign off, please re-consult as needed.    Please contact pharmacy at extension [phone] for questions regarding this assessment.    Thank you for the consult,   Tatiana Colunga, Telma       Patient brief summary:  Yan Pickering Jr. is a 80 y.o. male initiated on antimicrobial therapy with IV Vancomycin for treatment of skin & soft tissue infection    The patient's current regimen is pulse dosing (dosing by level) when random level is less than 20 mcg/mL.    Drug Allergies:   Review of patient's allergies indicates:   Allergen Reactions    Doxycycline Other (See Comments)     abd pain, bloating       Actual Body Weight:   56.7 kg    Renal Function:   Estimated Creatinine Clearance: 21.8 mL/min (A) (based on SCr of 2.1 mg/dL (H)).,     Dialysis Method (if applicable):  N/A    CBC (last 72 hours):  Recent Labs   Lab Result Units 10/15/20  1040   WBC K/uL 12.16   Hemoglobin g/dL 8.4*   Hematocrit % 27.5*   Platelets K/uL 143*   Gran% % 83.2*   Lymph% % 4.4*   Mono% % 10.9   Eosinophil% % 0.2   Basophil% % 0.2   Differential Method  Automated       Metabolic Panel (last 72 hours):  Recent Labs   Lab Result Units 10/15/20  1040 10/15/20  1433 10/16/20  0647   Sodium mmol/L 137  --  137   Potassium mmol/L 4.9  --  4.2   Chloride mmol/L 102  --  102   CO2 mmol/L 24  --  23   Glucose mg/dL 106  --  78   Glucose, UA   --  Negative  --    BUN, Bld mg/dL 56*  --  45*    Creatinine mg/dL 2.6*  --  2.1*   Albumin g/dL 2.8*  --   --    Total Bilirubin mg/dL 1.1*  --   --    Alkaline Phosphatase U/L 71  --   --    AST U/L 17  --   --    ALT U/L 10  --   --    Magnesium mg/dL 2.1  --   --        Vancomycin Administrations:  vancomycin given in the last 96 hours                   vancomycin 1.25 g in dextrose 5% 250 mL IVPB (ready to mix) (mg) 1,250 mg New Bag 10/15/20 1345                Microbiologic Results:  Microbiology Results (last 7 days)     Procedure Component Value Units Date/Time    Blood culture #2 [[id number]] Collected: 10/15/20 1055    Order Status: Completed Specimen: Blood from Peripheral, Antecubital, Right Updated: 10/16/20 0115     Blood Culture, Routine No Growth to date    Narrative:      Blood Culture #2    Blood culture #1 [[id number]] Collected: 10/15/20 1040    Order Status: Completed Specimen: Blood from Peripheral, Antecubital, Left Updated: 10/16/20 0115     Blood Culture, Routine No Growth to date    Narrative:      Blood Culture #1        Thank you for allowing us to participate in this patient's care.     Tatiana Colunga PharmD 10/16/2020 10:18 AM

## 2020-10-16 NOTE — ASSESSMENT & PLAN NOTE
Acute on chronic systolic and diastolic CHF  -IV lasix after maharaj placed  -cardiac diet with 1500 milliliter(s) fluid restriction  -daily weights  -strict I &O  -Metoprolol 100 mg BID   - Losartan 25 mg BID  -Repeat ECHO    10/16  Compensated  EF 45%  DD  BB  ARB

## 2020-10-16 NOTE — CONSULTS
[facility] -   General Surgery  Consult Note    Patient Name: Yan Pickering Jr.  MRN: [id number]  Code Status: Prior  Admission Date: 10/15/2020  Hospital Length of Stay: 0 days  Attending Physician: Manohar Romero MD  Primary Care Provider: Brenden Issa MD    Patient information was obtained from patient.     Inpatient consult to General Surgery  Consult performed by: Te Rendon MD  Consult ordered by: Yael Peck NP        Subjective:     Principal Problem: Acute on chronic combined systolic and diastolic CHF (congestive heart failure)    History of Present Illness: 80-year-old male referred for bilateral inguinal hernias.  These are reported to be chronic inguinal hernias.  The patient has no pain associated with these hernias.  He was noted to have bilateral inguinal hernias on CT scan.  He is being admitted for CHF and urinary retention/hydronephrosis.    No current facility-administered medications on file prior to encounter.      Current Outpatient Medications on File Prior to Encounter   Medication Sig    apixaban (ELIQUIS) 2.5 mg Tab Take 1 tablet (2.5 mg total) by mouth 2 (two) times daily.    losartan (COZAAR) 25 MG tablet Take 1 tablet (25 mg total) by mouth once daily.    metoprolol tartrate (LOPRESSOR) 100 MG tablet Take 1 tablet (100 mg total) by mouth 2 (two) times daily.    furosemide (LASIX) 40 MG tablet Take 1 tablet (40 mg total) by mouth daily as needed (swelling).    simethicone (MYLICON) 125 mg Cap capsule Take 125 mg by mouth 4 (four) times daily as needed.    [DISCONTINUED] docusate sodium (COLACE) 100 MG capsule Take 1 capsule (100 mg total) by mouth 2 (two) times daily.    [DISCONTINUED] polyethylene glycol (GLYCOLAX) 17 gram/dose powder Take 17 g by mouth once daily.       Review of patient's allergies indicates:   Allergen Reactions    Doxycycline Other (See Comments)     abd pain, bloating       Past Medical History:   Diagnosis Date    Atrial fibrillation      Cardiomyopathy 4/26/2017    CHF (congestive heart failure)     CKD (chronic kidney disease) stage 3, GFR 30-59 ml/min     Mild mitral insufficiency     Moderate aortic insufficiency     Venous stasis dermatitis of both lower extremities      History reviewed. No pertinent surgical history.  Family History     Problem Relation (Age of Onset)    Heart disease Father        Tobacco Use    Smoking status: Never Smoker    Smokeless tobacco: Never Used   Substance and Sexual Activity    Alcohol use: No    Drug use: No    Sexual activity: Not Currently     Review of Systems   Constitutional: Positive for activity change and fatigue. Negative for chills, fever and unexpected weight change.   HENT: Negative for congestion.    Eyes: Negative for visual disturbance.   Respiratory: Negative for shortness of breath.    Cardiovascular: Negative for chest pain.   Gastrointestinal: Negative for abdominal distention, abdominal pain, constipation, nausea, rectal pain and vomiting.   Genitourinary: Positive for difficulty urinating. Negative for dysuria.   Musculoskeletal: Negative for arthralgias.   Skin: Positive for wound. Negative for rash.   Neurological: Negative for light-headedness.   Hematological: Negative for adenopathy.     Objective:     Vital Signs (Most Recent):  Temp: 98 °F (36.7 °C) (10/15/20 1914)  Pulse: 101 (10/15/20 1914)  Resp: 18 (10/15/20 1914)  BP: (!) 87/52 (10/15/20 1914)  SpO2: 97 % (10/15/20 1914) Vital Signs (24h Range):  Temp:  [97.4 °F (36.3 °C)-100.1 °F (37.8 °C)] 98 °F (36.7 °C)  Pulse:  [100-115] 101  Resp:  [17-21] 18  SpO2:  [94 %-99 %] 97 %  BP: (85-96)/(46-61) 87/52     Weight: 57.9 kg (127 lb 10.3 oz)  Body mass index is 18.85 kg/m².    Physical Exam  Vitals signs reviewed.   Constitutional:       Appearance: He is well-developed.   HENT:      Head: Normocephalic and atraumatic.   Neck:      Musculoskeletal: Normal range of motion and neck supple.   Cardiovascular:      Rate and  Rhythm: Normal rate.   Pulmonary:      Effort: Pulmonary effort is normal.   Abdominal:      General: Bowel sounds are normal. There is no distension.      Palpations: Abdomen is soft.      Tenderness: There is no abdominal tenderness.      Hernia: A hernia (Bilateral inguinal hernias easily reducible, nontender) is present.   Neurological:      Mental Status: He is alert.         Significant Labs:  CBC:   Recent Labs   Lab 10/15/20  1040   WBC 12.16   RBC 2.80*   HGB 8.4*   HCT 27.5*   *   MCV 98   MCH 30.0   MCHC 30.5*     BMP:   Recent Labs   Lab 10/15/20  1040         K 4.9      CO2 24   BUN 56*   CREATININE 2.6*   CALCIUM 8.8   MG 2.1       Significant Diagnostics:  CT:  Impression:     1.  There is been interval development of moderate right hydronephrosis and hydroureter in this patient who has a right pelvic kidney.  No obvious obstructing process is seen.  A recently passed stone could have this appearance.  A radiolucent obstructing process at the UVJ could cause this appearance.  This could also be related to the moderate bladder distention that it is again seen.  Urologic consultation should be considered.     2. Negative for acute process otherwise.  Negative for new inflammatory changes.  Normal appendix.  Negative for left hydronephrosis.     3.  Numerous stable findings as noted above, to include cardiac chamber enlargement with coronary artery calcifications, small effusions with adjacent atelectasis, multiple hepatic cysts, bilateral renal cysts measuring up to 6.5 cm, osteopenia with degenerative changes of the spine and pelvis, bilateral inguinal hernias with loops of bowel within the hernia sacs, but without obstruction, wide neck anterior bladder diverticulum and prostate enlargement with calcifications.       Assessment/Plan:     Bilateral inguinal hernia without obstruction or gangrene  Chronic bilateral inguinal hernias currently asymptomatic with no evidence of  obstruction, no signs or symptoms of incarceration or strangulation.   No surgical indications at this time  Patient can follow up as an outpatient for further discussion/recommendations  Will sign off please call with questions      VTE Risk Mitigation (From admission, onward)         Ordered     apixaban tablet 2.5 mg  2 times daily      10/15/20 1833     IP VTE HIGH RISK PATIENT  Once      10/15/20 1833     Place sequential compression device  Until discontinued      10/15/20 1833                Thank you for your consult. I will sign off. Please contact us if you have any additional questions.    Te Rendon MD  General Surgery  [facility]

## 2020-10-16 NOTE — PROGRESS NOTES
Subjective:      Yan Pickering Jr. is a 80 y.o. with right hydro. NAEO, catheter placement was uncomfortable but not painful and the catheter has not really bothered him all that much since placement, no fevers, chills, n/v     Objective:     Temp:  [97 °F (36.1 °C)-100.1 °F (37.8 °C)] 97 °F (36.1 °C)  Pulse:  [] 94  Resp:  [16-21] 16  SpO2:  [94 %-99 %] 96 %  BP: ()/(46-61) 97/57  I/O last 3 completed shifts:  In: 1850 [IV Piggyback:1850]  Out: - 2.8L UOP      Gen: awake, alert, comfortable, NAD  HEENT: NC/AT, sclera normal, BL external ears normal  Resp: non-labored  CV: well-perfused  Abd: soft, non-tender  : Maharaj with CYU  Neuro: grossly intact    Labs:   Recent Labs   Lab 10/15/20  1040      K 4.9      CO2 24   BUN 56*   CREATININE 2.6*   CALCIUM 8.8     Recent Labs   Lab 10/15/20  1040   WBC 12.16   HGB 8.4*   HCT 27.5*   *       Assessment/Plan:   79 yo M with urinary retention likely 2/2 BPH now s/p maharaj catheter placement  - continue maharaj decompression, will plan on keeping maharaj at discharge  - monitor for post-obstructive diuresis  - would recommend starting flomax, as long as primary team does not anticipate worsening of CHF with the addition of this medication  - plan to obtain renal US in the PM tomorrow to ensure that right hydro is resolving with maharaj decompression  - rest of care per primary team  - do not hesitate to contact with any questions or concerns        Joaquim Pritchett MD  Urology

## 2020-10-16 NOTE — PROGRESS NOTES
[facility] Hospital Medicine  Progress Note    Patient Name: Yan Pickering Jr.  MRN: [id number]  Patient Class: IP- Inpatient   Admission Date: 10/15/2020  Length of Stay: 0 days  Attending Physician: Martín Hoffmann MD  Primary Care Provider: Brenden Issa MD        Subjective:     Principal Problem:Acute on chronic combined systolic and diastolic CHF (congestive heart failure)        HPI:  Yan Pickering Jr. is a 80 y.o. male patient with a PMHx of A-fib, CHF, CKD 4, Mild mitral insufficiency,chronic systolic CHF, cardiomyopathy, moderate aortic insufficiency, who presented to the ER for evaluation of a wound on L leg which has been treated yesterday at wound care but the drainage has not stopped until 4 AM. Pt's ex-wife was concerned if there is hernia on RLQ of abdomen. Symptoms are constant and moderate in severity. No mitigating or exacerbating factors reported. Associated sxs included increased drainage, wound is hot to touch,  RLQ abdominal pain, and fever (Tnow 99.8 F). Patient denies any SOB, CP, HA, n/v/d, cough, chills, and all other sxs at this time. No further complaints or concerns at this time. In ER, CT abdomen/pelvis showed moderate right hydronephrosis, bilateral inguinal hernias with loops of bowel within the hernia sacs, H/H 8.4/27.5, Creatinine 2.6 (baseline 2.9), BNP 1579, procalcitonin 0.39, Neg U/A, chest Xray subtle infiltrate right upper lobe with emphysematous changes. SDM: ex-wife, Chikis Bridges [phone].  Observation for Acute on chronic combined CHF and right hydronephrosis.     Overview/Hospital Course:  10/16 - Patient EF 45%, Per surgery no intervention indicated for Hernia - f/u as o/p. Lower ext Chronic venous stasis derm stable. Patient with a positive BC possible contaminant. Await delineation of organism.. Discussed with patient and POA at bedside. No further complaint provided.    Interval History: Improving. No sx intervention indicated.     Review  of Systems   Constitutional: Positive for activity change and fatigue. Negative for appetite change and fever.   HENT: Negative.  Negative for congestion, sinus pressure and sore throat.    Eyes: Negative.  Negative for photophobia, discharge and visual disturbance.   Respiratory: Negative.  Negative for cough, chest tightness, shortness of breath and wheezing.    Cardiovascular: Negative.  Negative for chest pain and palpitations.   Gastrointestinal: Negative.  Negative for abdominal pain, blood in stool, constipation, diarrhea, nausea and vomiting.   Endocrine: Negative.    Genitourinary: Negative.    Musculoskeletal: Negative.  Negative for myalgias, neck pain and neck stiffness.   Skin: Negative.    Allergic/Immunologic: Negative.    Neurological: Positive for weakness. Negative for seizures, syncope and headaches.   Hematological: Negative.    Psychiatric/Behavioral: Negative.  Negative for agitation, behavioral problems, hallucinations, self-injury and suicidal ideas.     Objective:     Vital Signs (Most Recent):  Temp: 98 °F (36.7 °C) (10/16/20 1118)  Pulse: (!) 120 (10/16/20 1618)  Resp: 18 (10/16/20 1618)  BP: (!) 103/57 (10/16/20 1618)  SpO2: 97 % (10/16/20 1618) Vital Signs (24h Range):  Temp:  [97 °F (36.1 °C)-98.2 °F (36.8 °C)] 98 °F (36.7 °C)  Pulse:  [] 120  Resp:  [16-18] 18  SpO2:  [94 %-97 %] 97 %  BP: ()/(52-68) 103/57     Weight: 56.7 kg (125 lb)  Body mass index is 20.18 kg/m².    Intake/Output Summary (Last 24 hours) at 10/16/2020 1832  Last data filed at 10/16/2020 0900  Gross per 24 hour   Intake 240 ml   Output 3675 ml   Net -3435 ml      Physical Exam  Vitals signs and nursing note reviewed.   Constitutional:       Appearance: He is well-developed.   HENT:      Head: Normocephalic and atraumatic.   Eyes:      Pupils: Pupils are equal, round, and reactive to light.   Neck:      Musculoskeletal: Normal range of motion and neck supple.   Cardiovascular:      Rate and Rhythm: Normal  rate and regular rhythm.      Heart sounds: Normal heart sounds.   Pulmonary:      Effort: Pulmonary effort is normal. No respiratory distress.      Breath sounds: Normal breath sounds. No wheezing.   Abdominal:      General: Bowel sounds are normal.      Palpations: Abdomen is soft.   Musculoskeletal: Normal range of motion.         General: No deformity.      Comments: + Inguinal hernias   Skin:     General: Skin is warm and dry.      Capillary Refill: Capillary refill takes 2 to 3 seconds.   Neurological:      Mental Status: He is alert and oriented to person, place, and time.      Deep Tendon Reflexes: Reflexes are normal and symmetric.   Psychiatric:         Behavior: Behavior normal.         Thought Content: Thought content normal.         Judgment: Judgment normal.         Significant Labs:   BMP:   Recent Labs   Lab 10/15/20  1040 10/16/20  0647    78    137   K 4.9 4.2    102   CO2 24 23   BUN 56* 45*   CREATININE 2.6* 2.1*   CALCIUM 8.8 8.4*   MG 2.1  --      CBC:   Recent Labs   Lab 10/15/20  1040   WBC 12.16   HGB 8.4*   HCT 27.5*   *     CMP:   Recent Labs   Lab 10/15/20  1040 10/16/20  0647    137   K 4.9 4.2    102   CO2 24 23    78   BUN 56* 45*   CREATININE 2.6* 2.1*   CALCIUM 8.8 8.4*   PROT 6.6  --    ALBUMIN 2.8*  --    BILITOT 1.1*  --    ALKPHOS 71  --    AST 17  --    ALT 10  --    ANIONGAP 11 12   EGFRNONAA 22* 28*     Troponin:   Recent Labs   Lab 10/15/20  1040   TROPONINI 0.011     Urine Studies:   Recent Labs   Lab 10/15/20  1433   COLORU Yellow   APPEARANCEUA Clear   PHUR 6.0   SPECGRAV 1.020   PROTEINUA Negative   GLUCUA Negative   KETONESU Negative   BILIRUBINUA Negative   OCCULTUA Negative   NITRITE Negative   UROBILINOGEN Negative   LEUKOCYTESUR Negative     All pertinent labs within the past 24 hours have been reviewed.    Significant Imaging: I have reviewed all pertinent imaging results/findings within the past 24  hours.      Assessment/Plan:      * Acute on chronic combined systolic and diastolic CHF (congestive heart failure)  Acute on chronic systolic and diastolic CHF  -IV lasix after maharaj placed  -cardiac diet with 1500 milliliter(s) fluid restriction  -daily weights  -strict I &O  -Metoprolol 100 mg BID   - Losartan 25 mg BID  -Repeat ECHO    10/16  Compensated  EF 45%  DD  BB  ARB        Bacteremia  G+ Cocci  Await s/n  Possible contaminant      Bilateral inguinal hernia without obstruction or gangrene  No intervention indicated  Follow up with Sx as O/P      Urinary retention  -Maharaj  -Urology consult for hydronephrosis  -strict I&O      Abdominal pain  -Surgical consult for bilateral hernias      Chronic atrial fibrillation  -continue eliquis  -continue  mg BID  -Tachy 100's    Venous stasis dermatitis of both lower extremities  -wound care consult  -U/S bilateral legs for DVT    10/16  Dsg c/d/i        CKD (chronic kidney disease) stage 4, GFR 15-29 ml/min  10/15/20 Creatinine 2.6. (Baseline 2.5-2.9)  -monitor labs  -avoid nephrotoxic labs    10/16  Cr 2.1  Monitor      VTE Risk Mitigation (From admission, onward)         Ordered     apixaban tablet 2.5 mg  2 times daily      10/15/20 1833     IP VTE HIGH RISK PATIENT  Once      10/15/20 1833     Place sequential compression device  Until discontinued      10/15/20 1833                Discharge Planning   TACOS:      Code Status: Prior   Is the patient medically ready for discharge?:     Reason for patient still in hospital (select all that apply): Pending disposition  Discharge Plan A: Home with family                  Martín Hoffmann MD  Department of Hospital Medicine   [facility]

## 2020-10-17 VITALS
HEART RATE: 127 BPM | RESPIRATION RATE: 17 BRPM | DIASTOLIC BLOOD PRESSURE: 57 MMHG | SYSTOLIC BLOOD PRESSURE: 94 MMHG | OXYGEN SATURATION: 95 % | HEIGHT: 66 IN | WEIGHT: 114.19 LBS | BODY MASS INDEX: 18.35 KG/M2 | TEMPERATURE: 97 F

## 2020-10-17 PROBLEM — R78.81 BACTEREMIA: Status: RESOLVED | Noted: 2020-10-16 | Resolved: 2020-10-17

## 2020-10-17 PROBLEM — R33.9 URINARY RETENTION: Status: RESOLVED | Noted: 2020-10-15 | Resolved: 2020-10-17

## 2020-10-17 PROBLEM — I50.43 ACUTE ON CHRONIC COMBINED SYSTOLIC AND DIASTOLIC CHF (CONGESTIVE HEART FAILURE): Status: RESOLVED | Noted: 2017-03-16 | Resolved: 2020-10-17

## 2020-10-17 PROBLEM — R10.9 ABDOMINAL PAIN: Status: RESOLVED | Noted: 2020-10-15 | Resolved: 2020-10-17

## 2020-10-17 LAB
ANION GAP SERPL CALC-SCNC: 9 MMOL/L (ref 8–16)
BASOPHILS # BLD AUTO: 0.02 K/UL (ref 0–0.2)
BASOPHILS NFR BLD: 0.2 % (ref 0–1.9)
BUN SERPL-MCNC: 41 MG/DL (ref 8–23)
CALCIUM SERPL-MCNC: 8.4 MG/DL (ref 8.7–10.5)
CHLORIDE SERPL-SCNC: 102 MMOL/L (ref 95–110)
CO2 SERPL-SCNC: 27 MMOL/L (ref 23–29)
CREAT SERPL-MCNC: 1.9 MG/DL (ref 0.5–1.4)
DIFFERENTIAL METHOD: ABNORMAL
EOSINOPHIL # BLD AUTO: 0.1 K/UL (ref 0–0.5)
EOSINOPHIL NFR BLD: 0.8 % (ref 0–8)
ERYTHROCYTE [DISTWIDTH] IN BLOOD BY AUTOMATED COUNT: 15.9 % (ref 11.5–14.5)
EST. GFR  (AFRICAN AMERICAN): 37 ML/MIN/1.73 M^2
EST. GFR  (NON AFRICAN AMERICAN): 32 ML/MIN/1.73 M^2
GLUCOSE SERPL-MCNC: 79 MG/DL (ref 70–110)
HCT VFR BLD AUTO: 29.6 % (ref 40–54)
HGB BLD-MCNC: 9.1 G/DL (ref 14–18)
IMM GRANULOCYTES # BLD AUTO: 0.08 K/UL (ref 0–0.04)
IMM GRANULOCYTES NFR BLD AUTO: 1 % (ref 0–0.5)
LYMPHOCYTES # BLD AUTO: 0.9 K/UL (ref 1–4.8)
LYMPHOCYTES NFR BLD: 10.7 % (ref 18–48)
MCH RBC QN AUTO: 29.4 PG (ref 27–31)
MCHC RBC AUTO-ENTMCNC: 30.7 G/DL (ref 32–36)
MCV RBC AUTO: 96 FL (ref 82–98)
MONOCYTES # BLD AUTO: 0.8 K/UL (ref 0.3–1)
MONOCYTES NFR BLD: 10 % (ref 4–15)
NEUTROPHILS # BLD AUTO: 6.4 K/UL (ref 1.8–7.7)
NEUTROPHILS NFR BLD: 77.3 % (ref 38–73)
NRBC BLD-RTO: 0 /100 WBC
PLATELET # BLD AUTO: 189 K/UL (ref 150–350)
PMV BLD AUTO: 10 FL (ref 9.2–12.9)
POTASSIUM SERPL-SCNC: 3.5 MMOL/L (ref 3.5–5.1)
RBC # BLD AUTO: 3.09 M/UL (ref 4.6–6.2)
SODIUM SERPL-SCNC: 138 MMOL/L (ref 136–145)
WBC # BLD AUTO: 8.24 K/UL (ref 3.9–12.7)

## 2020-10-17 PROCEDURE — 85025 COMPLETE CBC W/AUTO DIFF WBC: CPT | Mod: HCNC

## 2020-10-17 PROCEDURE — 36415 COLL VENOUS BLD VENIPUNCTURE: CPT | Mod: HCNC

## 2020-10-17 PROCEDURE — 80048 BASIC METABOLIC PNL TOTAL CA: CPT | Mod: HCNC

## 2020-10-17 PROCEDURE — 25000003 PHARM REV CODE 250: Mod: HCNC | Performed by: INTERNAL MEDICINE

## 2020-10-17 PROCEDURE — 25000003 PHARM REV CODE 250: Mod: HCNC | Performed by: NURSE PRACTITIONER

## 2020-10-17 PROCEDURE — 99231 PR SUBSEQUENT HOSPITAL CARE,LEVL I: ICD-10-PCS | Mod: HCNC,,, | Performed by: UROLOGY

## 2020-10-17 PROCEDURE — 99231 SBSQ HOSP IP/OBS SF/LOW 25: CPT | Mod: HCNC,,, | Performed by: UROLOGY

## 2020-10-17 RX ORDER — LEVOFLOXACIN 500 MG/1
500 TABLET, FILM COATED ORAL DAILY
Status: DISCONTINUED | OUTPATIENT
Start: 2020-10-17 | End: 2020-10-17

## 2020-10-17 RX ORDER — LEVOFLOXACIN 750 MG/1
750 TABLET ORAL
Status: DISCONTINUED | OUTPATIENT
Start: 2020-10-17 | End: 2020-10-17 | Stop reason: HOSPADM

## 2020-10-17 RX ADMIN — LOSARTAN POTASSIUM 25 MG: 25 TABLET ORAL at 09:10

## 2020-10-17 RX ADMIN — APIXABAN 2.5 MG: 2.5 TABLET, FILM COATED ORAL at 09:10

## 2020-10-17 RX ADMIN — LEVOFLOXACIN 750 MG: 750 TABLET, FILM COATED ORAL at 09:10

## 2020-10-17 RX ADMIN — METOPROLOL TARTRATE 100 MG: 50 TABLET, FILM COATED ORAL at 09:10

## 2020-10-17 NOTE — PROGRESS NOTES
Subjective:      Yan Pickering Jr. is a 80 y.o. with right hydro. NAEO, catheter placement was uncomfortable but not painful and the catheter has not really bothered him all that much since placement, no fevers, chills, n/v     Objective:     Temp:  [96.6 °F (35.9 °C)-98.3 °F (36.8 °C)] 96.6 °F (35.9 °C)  Pulse:  [106-130] 122  Resp:  [17-18] 17  SpO2:  [92 %-97 %] 95 %  BP: ()/(51-69) 94/57  I/O last 3 completed shifts:  In: 838 [P.O.:838]  Out: 6775 [Urine:6775]      Gen: awake, alert, comfortable, NAD  HEENT: NC/AT, sclera normal, BL external ears normal  Resp: non-labored  CV: well-perfused  Abd: soft, non-tender  : Maharaj with CYU  Neuro: grossly intact    Labs:   Recent Labs   Lab 10/15/20  1040 10/16/20  0647 10/17/20  0646    137 138   K 4.9 4.2 3.5    102 102   CO2 24 23 27   BUN 56* 45* 41*   CREATININE 2.6* 2.1* 1.9*   CALCIUM 8.8 8.4* 8.4*     Recent Labs   Lab 10/15/20  1040 10/17/20  0646   WBC 12.16 8.24   HGB 8.4* 9.1*   HCT 27.5* 29.6*   * 189       Assessment/Plan:   81 yo M with urinary retention likely 2/2 BPH now s/p maharaj catheter placement  - continue maharaj decompression, will plan on keeping maharaj at discharge  - monitor for post-obstructive diuresis  - would recommend starting flomax, as long as primary team does not anticipate worsening of CHF with the addition of this medication  - rest of care per primary team  - do not hesitate to contact with any questions or concerns       Joaquim Pritchett MD  Urology

## 2020-10-17 NOTE — DISCHARGE SUMMARY
[facility] Hospital Medicine  Discharge Summary      Patient Name: Yan Pickering Jr.  MRN: [id number]  Admission Date: 10/15/2020  Hospital Length of Stay: 1 days  Discharge Date and Time: 10/17/2020 12:46 PM  Attending Physician: No att. providers found   Discharging Provider: Martín Hoffmann MD  Primary Care Provider: Brenden Issa MD      HPI:   Yan Pickering Jr. is a 80 y.o. male patient with a PMHx of A-fib, CHF, CKD 4, Mild mitral insufficiency,chronic systolic CHF, cardiomyopathy, moderate aortic insufficiency, who presented to the ER for evaluation of a wound on L leg which has been treated yesterday at wound care but the drainage has not stopped until 4 AM. Pt's ex-wife was concerned if there is hernia on RLQ of abdomen. Symptoms are constant and moderate in severity. No mitigating or exacerbating factors reported. Associated sxs included increased drainage, wound is hot to touch,  RLQ abdominal pain, and fever (Tnow 99.8 F). Patient denies any SOB, CP, HA, n/v/d, cough, chills, and all other sxs at this time. No further complaints or concerns at this time. In ER, CT abdomen/pelvis showed moderate right hydronephrosis, bilateral inguinal hernias with loops of bowel within the hernia sacs, H/H 8.4/27.5, Creatinine 2.6 (baseline 2.9), BNP 1579, procalcitonin 0.39, Neg U/A, chest Xray subtle infiltrate right upper lobe with emphysematous changes. SDM: ex-wife, Chikis Bridges [phone].  Observation for Acute on chronic combined CHF and right hydronephrosis.     * No surgery found *      Hospital Course:   10/16 - Patient EF 45%, Per surgery no intervention indicated for Hernia - f/u as o/p. Lower ext Chronic venous stasis derm stable. Patient with a positive BC possible contaminant. Await delineation of organism.. Discussed with patient and POA at bedside. No further complaint provided.  Patient 80 yomale presenting to hospital with c/o Lower ext weeping wound. Patient evaluated and  determined to have chronic venous stasis. Patient with G+ cocci bacteremia POA. Discussed with ID who recommended following patient and repeat cultures as this is a likely contaminant. Patient evaluated by Urology for hydronephrosis. Maharaj placed with significant retention identified. Patient to d/c with maharaj and follow up with Urology as o/p. Patient CHF compensated and stable. Patient inguinal hernias evaluated by Surgery and recommended for O/P follow up. Patient deemed appropriate for discharge on rounds today and discharged to home with PCP and consulting followups as listed.     Consults:   Consults (From admission, onward)        Status Ordering Provider     Inpatient consult to General Surgery  Once     Provider:  Te Rendon MD    Completed DARRELL SIDHU PBailey     Inpatient consult to Registered Dietitian/Nutritionist  Once     Provider:  (Not yet assigned)    Completed DARRELL SIDHU     Inpatient consult to Social Work/Case Management  Once     Provider:  (Not yet assigned)    Completed DARRELL SIDHU     Inpatient consult to Urology  Once     Provider:  Joaquim Pritchett MD    Completed DARRELL SIDHU.          No new Assessment & Plan notes have been filed under this hospital service since the last note was generated.  Service: Hospital Medicine    Final Active Diagnoses:    Diagnosis Date Noted POA    Moderate malnutrition [E44.0] 10/16/2020 Yes    Bilateral inguinal hernia without obstruction or gangrene [K40.20] 10/15/2020 Yes    Chronic atrial fibrillation [I48.20] 03/28/2017 Yes    CKD (chronic kidney disease) stage 4, GFR 15-29 ml/min [N18.4]  Yes    Venous stasis dermatitis of both lower extremities [I87.2]  Yes      Problems Resolved During this Admission:    Diagnosis Date Noted Date Resolved POA    PRINCIPAL PROBLEM:  Acute on chronic combined systolic and diastolic CHF (congestive heart failure) [I50.43] 03/16/2017 10/17/2020 Yes    Bacteremia [R78.81] 10/16/2020  10/17/2020 Yes    Abdominal pain [R10.9] 10/15/2020 10/17/2020 Yes    Urinary retention [R33.9] 10/15/2020 10/17/2020 Yes       Discharged Condition: stable    Disposition: Home or Self Care    Follow Up:  Follow-up Information     Brenden Issa MD In 3 days.    Specialty: Family Medicine  Contact information:  [address] [facility] [address] [phone]                 Patient Instructions:      Ambulatory referral/consult to Outpatient Case Management   Referral Priority: Routine Referral Type: Consultation   Referral Reason: Specialty Services Required   Number of Visits Requested: 1     Diet renal     Activity as tolerated       Significant Diagnostic Studies: Labs:   BMP:   Recent Labs   Lab 10/16/20  0647 10/17/20  0646   GLU 78 79    138   K 4.2 3.5    102   CO2 23 27   BUN 45* 41*   CREATININE 2.1* 1.9*   CALCIUM 8.4* 8.4*   , CMP   Recent Labs   Lab 10/16/20  0647 10/17/20  0646    138   K 4.2 3.5    102   CO2 23 27   GLU 78 79   BUN 45* 41*   CREATININE 2.1* 1.9*   CALCIUM 8.4* 8.4*   ANIONGAP 12 9   ESTGFRAFRICA 33* 37*   EGFRNONAA 28* 32*   , CBC   Recent Labs   Lab 10/17/20  0646   WBC 8.24   HGB 9.1*   HCT 29.6*      , Troponin   Recent Labs   Lab 10/15/20  1040   TROPONINI 0.011    and All labs within the past 24 hours have been reviewed    Pending Diagnostic Studies:     None         Medications:  Reconciled Home Medications:      Medication List      CONTINUE taking these medications    apixaban 2.5 mg Tab  Commonly known as: ELIQUIS  Take 1 tablet (2.5 mg total) by mouth 2 (two) times daily.     furosemide 40 MG tablet  Commonly known as: LASIX  Take 1 tablet (40 mg total) by mouth daily as needed (swelling).     losartan 25 MG tablet  Commonly known as: COZAAR  Take 1 tablet (25 mg total) by mouth once daily.     metoprolol tartrate 100 MG tablet  Commonly known as: LOPRESSOR  Take 1 tablet (100 mg total) by mouth 2 (two) times daily.      simethicone 125 mg Cap capsule  Commonly known as: MYLICON  Take 125 mg by mouth 4 (four) times daily as needed.            Indwelling Lines/Drains at time of discharge:   Lines/Drains/Airways     None                 Time spent on the discharge of patient: 39 minutes  Patient was seen and examined on the date of discharge and determined to be suitable for discharge.         Martín Hoffmann MD  Department of Hospital Medicine  [facility]

## 2020-10-17 NOTE — PLAN OF CARE
POC reviewed with pt; pt verbalized understanding  Pt able to verbalize needs; denies pain or discomfort at this time  AAOx4; Afib on tele monitor with increased HR reaching 150 at times; other VSS  20g LAC  [place] to gravity   80mg IV lasix administered   Pt free of falls  Safety precautions maintained

## 2020-10-17 NOTE — PLAN OF CARE
10/17/20 0941   Final Note   Assessment Type Final Discharge Note   Anticipated Discharge Disposition Home   Right Care Referral Info   Post Acute Recommendation No Care     No d/c needs noted

## 2020-10-17 NOTE — PLAN OF CARE
Pt remains fall free this shift.  Pt AAOx4, verbal, clear speech.  Skin warm and dry. No new skin issues.  Telemonitoring in progress, 100s-140s ST to [facility]  Room air  [place]   Assist x1with transfers.  Bed low, side rails up x 2, wheels locked, call light in reach.   Bed alarm maintained for safety.   Patient instructed to call for assistance.   Hourly rounding completed.   24 hour chart check completed  POC updated and reviewed with pt. Will continue POC.

## 2020-10-17 NOTE — PROGRESS NOTES
Pharmacist Renal Dose Adjustment Note    Yan Pickering Jr. is a 80 y.o. male being treated with the medication Levaquin    Patient Data:    Vital Signs (Most Recent):  Temp: 96.6 °F (35.9 °C) (10/17/20 0723)  Pulse: (!) 122 (10/17/20 0723)  Resp: 17 (10/17/20 0723)  BP: (!) 94/57 (10/17/20 0723)  SpO2: 95 % (10/17/20 0723)   Vital Signs (72h Range):  Temp:  [96.6 °F (35.9 °C)-100.1 °F (37.8 °C)]   Pulse:  []   Resp:  [16-21]   BP: ()/(46-69)   SpO2:  [92 %-99 %]      Recent Labs   Lab 10/15/20  1040 10/16/20  0647 10/17/20  0646   CREATININE 2.6* 2.1* 1.9*     Serum creatinine: 1.9 mg/dL (H) 10/17/20 0646  Estimated creatinine clearance: 22 mL/min (A)    Medication:Levaquin dose: 500mg frequency q24 will be changed to medication:Levaquin dose:750mg frequency:q48    Pharmacist's Name: Betty Jennings  Pharmacist's Extension: [phone]

## 2020-10-18 LAB
BACTERIA BLD CULT: ABNORMAL

## 2020-10-19 ENCOUNTER — PATIENT OUTREACH (OUTPATIENT)
Dept: ADMINISTRATIVE | Facility: CLINIC | Age: 80
End: 2020-10-19

## 2020-10-19 ENCOUNTER — TELEPHONE (OUTPATIENT)
Dept: UROLOGY | Facility: CLINIC | Age: 80
End: 2020-10-19

## 2020-10-19 DIAGNOSIS — R31.9 HEMATURIA, UNSPECIFIED TYPE: ICD-10-CM

## 2020-10-19 NOTE — TELEPHONE ENCOUNTER
----- Message from Joaquim Pritchett MD sent at 10/17/2020  9:41 AM CDT -----  Regarding: f/u  Please schedule Mr Pickering for f/u in clinic in 1-2 weeks for BPH outlet procedure discussion, Thanks!

## 2020-10-19 NOTE — TELEPHONE ENCOUNTER
----- Message from Joaquim Pritchett MD sent at 10/17/2020  9:45 AM CDT -----  Regarding: imaging  Could we also order a CT stone prior to his visit? He had right hydro during his hospitalization that I want to make sure resolves, thanks!

## 2020-10-19 NOTE — PATIENT INSTRUCTIONS
When Your Child Has Congestive Heart Failure (CHF)  Congestive heart failure (CHF) is a condition in which the heart does not pump as well as it should. When this happens, fluid can build up in the lungs or body tissues (congestion). CHF can cause lung problems, organ failure, and other serious problems in the body. CHF can usually be treated, but it is important to find out the underlying cause. Your childs healthcare provider will evaluate your childs heart and discuss treatment options with you.  What causes congestive heart failure?  CHF often develops in children with certain heart defects present at birth (congenital heart defects). These include defects such as holes in the heart, which cause an increased amount of blood flow from one side of the heart to the other. This changes the dynamics of blood flow and can cause one side of the heart to become weaker. The heart then is unable to support the blood flow resulting in worsening heart function. CHF can also be caused by other types of heart problems such as cardiomyopathy, a condition in which the hearts pumping function is impaired. Some non-heart problems, such as kidney failure, can lead to CHF due to changes in the body's fluid balance or hormone changes that lead to high blood pressure.    What are the symptoms of CHF?  Symptoms vary but may include:  · Swelling (edema) in the face, abdomen, ankles, or feet  · Shortness of breath, rapid breathing, wheezing, or excessive coughing  · Sweating  · Weakness or tiredness  · Poor feeding and weight gain (in infants)  · Racing heartbeat  · Wheezing  · Abdominal pain and nausea  In older children, symptoms may also include:  · Weight loss  · Passing out  · Chest pain  · Tiring easily during exercise  How is the cause of congestive heart failure diagnosed?  Heart problems in children are usually diagnosed and treated by a pediatric cardiologist. This is a doctor who specializes in diagnosing and treating  children's heart disease. The cardiologist will do a physical exam and ask about your childs health history. The following tests may be done to find the underlying cause of CHF:  · Chest X-ray. This test takes a picture of the heart and lungs. The picture can show your childs heart size and shape. This picture also shows the fluid status of your child's lungs, which can be a clue to the heart's function.  · Electrocardiogram (ECG or EKG). During this test, the electrical activity of the heart is recorded to check for heart rhythm problems (arrhythmias) or problems with heart structure.  · Echocardiogram (echo). During this test, sound waves (ultrasound) are used to create a picture of the heart. This test can show problems with heart structure or function. This includes showing how well the heart pumps, if the heart is enlarged, the direction and strength of blood flow, or if there are any valve problems.  · Lab tests. For these tests, blood and urine samples are taken to check for problems in the kidneys or other organs.  How is congestive heart failure treated?  Specific treatment for your child depends on the cause of CHF. If the cause of CHF in your child is a congenital heart defect, a catheter or surgical procedure may be done to repair the defect.  · Medicines are often prescribed to help manage your childs symptoms. These can include:  ¨ Diuretics help rid the body of excess water. This reduces fluid in the lungs and may improve breathing. These are very important in helping manage fluid status in heart failure.  ¨ Digoxin helps the heart pump blood with more force. This improves the hearts performance.  ¨ ACE inhibitors make blood vessels relax and allow blood to flow more easily from the heart.   ¨ Angiotensin receptor blockers or ARBs are very similar to ACE inhibitors. They may be used in a child who can't take an ACE inhibitor.  ¨ Beta-blockers lower blood pressure and slow heart rate by altering  hormones that can damage the heart. Beta-blockers can also improve the hearts pumping action over time.  · Pacemaker. Some children with heart failure need an artificial pacemaker. The pacemaker may help when the heart is not pumping well because of a slow heartbeat.  · Cardiac resynchronization therapy. This uses a special type of pacemaker that paces both pumping chambers of the heart at the same time to coordinate contractions and improve the heart's function. This treatment may be used in some children with long-term heart failure.  · Heart transplant. This is when the diseased heart is replaced with a healthy heart from a donor. This is only an option for very serious disease. And, it often takes some time to find a suitable heart. In some cases, a child may be able to be helped with devices that help the heart pump while he or she waits for a transplant.  Your child may benefit from seeing a nutritionist to help with nutrition for growth problems and to help balance fluids. He or she may also participate in an exercise rehab program to help with the ability to be active.  What are the long-term concerns?  The outcome for a child with CHF depends on many factors, including the underlying heart problem. The cardiologist will discuss your childs condition, treatment options, and potential outcomes with you.  Date Last Reviewed: 3/6/2016  © 6950-9327 [employer], [employer]. [address] [place] [address], [place] [address]. All rights reserved. This information is not intended as a substitute for professional medical care. Always follow your healthcare professional's instructions.

## 2020-10-19 NOTE — TELEPHONE ENCOUNTER
C3 nurse attempted to contact patient. No answer. The following message was left for the patient to return the call:  Good morning I am a nurse calling on behalf of [facility] from the [facility].  This is a Transitional Care Call for Jr Bailey Molina When you have a moment please contact us at [phone] or [phone] Monday through Friday, between the hours of 8 am to 4 pm. We look forward to speaking with you. On behalf of [facility] have a nice day.    The patient does not have a scheduled HOSFU appointment within 7-14 days post hospital discharge date 10/17/20.

## 2020-10-20 ENCOUNTER — TELEPHONE (OUTPATIENT)
Dept: UROLOGY | Facility: CLINIC | Age: 80
End: 2020-10-20

## 2020-10-20 ENCOUNTER — TELEPHONE (OUTPATIENT)
Dept: CARDIOLOGY | Facility: CLINIC | Age: 80
End: 2020-10-20

## 2020-10-20 LAB — BACTERIA BLD CULT: NORMAL

## 2020-10-20 NOTE — TELEPHONE ENCOUNTER
----- Message from Edie Flannery RN sent at 10/20/2020  2:09 PM CDT -----  Regarding: chf 48 hour call, schedule f/u appt with GASTON

## 2020-10-20 NOTE — TELEPHONE ENCOUNTER
Called and spoke with care taker about pt's appointment for a ct scqan on November 11 at 1:30 at the [place] location.  Voices understanding

## 2020-10-20 NOTE — TELEPHONE ENCOUNTER
CHF 48 hour call    Called and spoke with Chikis. Chikis informed pt sleeping and doing well. Denies CP, SOB, orthopnea, PND, or edema. Reviewed medications per MAR and reconciled.    Watch for these Signs and Symptoms  If any of these occur, contact your doctor immediately:   Increase in shortness of breath with movement   Increase in swelling in your legs and ankles   Weight gain of more than 2 pounds in a night or 5 pounds in a week   Difficulty breathing when you are lying down   Worsening fatigue or tiredness   Stomach bloating, a full feeling or a loss of appetite   Increased coughing--especially when you are lying down    Call 911 if any of the followin: Worsening chest tightness or chest pain  2: Cough with pink, frothy sputum      Scheduled hospital f/u appt with BENJAMÍN Lizama on Friday, 10-23-20 at 8am. Chikis verbalized understanding of above information and all questions answered.

## 2020-10-23 ENCOUNTER — OFFICE VISIT (OUTPATIENT)
Dept: INTERNAL MEDICINE | Facility: CLINIC | Age: 80
End: 2020-10-23
Payer: MEDICARE

## 2020-10-23 ENCOUNTER — OUTPATIENT CASE MANAGEMENT (OUTPATIENT)
Dept: ADMINISTRATIVE | Facility: OTHER | Age: 80
End: 2020-10-23

## 2020-10-23 ENCOUNTER — OFFICE VISIT (OUTPATIENT)
Dept: CARDIOLOGY | Facility: CLINIC | Age: 80
End: 2020-10-23
Payer: MEDICARE

## 2020-10-23 VITALS
HEART RATE: 105 BPM | DIASTOLIC BLOOD PRESSURE: 60 MMHG | WEIGHT: 114 LBS | OXYGEN SATURATION: 96 % | HEIGHT: 66 IN | SYSTOLIC BLOOD PRESSURE: 90 MMHG | BODY MASS INDEX: 18.32 KG/M2

## 2020-10-23 VITALS
HEART RATE: 98 BPM | SYSTOLIC BLOOD PRESSURE: 90 MMHG | HEIGHT: 66 IN | WEIGHT: 114 LBS | DIASTOLIC BLOOD PRESSURE: 60 MMHG | OXYGEN SATURATION: 98 % | RESPIRATION RATE: 18 BRPM | BODY MASS INDEX: 18.32 KG/M2 | TEMPERATURE: 99 F

## 2020-10-23 DIAGNOSIS — R53.81 DEBILITY: Primary | ICD-10-CM

## 2020-10-23 DIAGNOSIS — R33.9 URINARY RETENTION: ICD-10-CM

## 2020-10-23 DIAGNOSIS — K59.00 CONSTIPATION, UNSPECIFIED CONSTIPATION TYPE: ICD-10-CM

## 2020-10-23 DIAGNOSIS — I42.9 CARDIOMYOPATHY, UNSPECIFIED TYPE: ICD-10-CM

## 2020-10-23 DIAGNOSIS — I35.1 MODERATE AORTIC INSUFFICIENCY: ICD-10-CM

## 2020-10-23 DIAGNOSIS — I48.20 ATRIAL FIBRILLATION, CHRONIC: ICD-10-CM

## 2020-10-23 DIAGNOSIS — I48.20 CHRONIC ATRIAL FIBRILLATION: Primary | ICD-10-CM

## 2020-10-23 DIAGNOSIS — I95.9 HYPOTENSION, UNSPECIFIED HYPOTENSION TYPE: ICD-10-CM

## 2020-10-23 PROCEDURE — 99214 PR OFFICE/OUTPT VISIT, EST, LEVL IV, 30-39 MIN: ICD-10-PCS | Mod: HCNC,S$GLB,, | Performed by: FAMILY MEDICINE

## 2020-10-23 PROCEDURE — 99214 OFFICE O/P EST MOD 30 MIN: CPT | Mod: HCNC,S$GLB,, | Performed by: FAMILY MEDICINE

## 2020-10-23 PROCEDURE — 1101F PT FALLS ASSESS-DOCD LE1/YR: CPT | Mod: HCNC,CPTII,S$GLB, | Performed by: NURSE PRACTITIONER

## 2020-10-23 PROCEDURE — 3288F PR FALLS RISK ASSESSMENT DOCUMENTED: ICD-10-PCS | Mod: HCNC,CPTII,S$GLB, | Performed by: FAMILY MEDICINE

## 2020-10-23 PROCEDURE — 1159F PR MEDICATION LIST DOCUMENTED IN MEDICAL RECORD: ICD-10-PCS | Mod: HCNC,S$GLB,, | Performed by: FAMILY MEDICINE

## 2020-10-23 PROCEDURE — 1125F AMNT PAIN NOTED PAIN PRSNT: CPT | Mod: HCNC,S$GLB,, | Performed by: NURSE PRACTITIONER

## 2020-10-23 PROCEDURE — 3078F DIAST BP <80 MM HG: CPT | Mod: HCNC,CPTII,S$GLB, | Performed by: FAMILY MEDICINE

## 2020-10-23 PROCEDURE — 1125F AMNT PAIN NOTED PAIN PRSNT: CPT | Mod: HCNC,S$GLB,, | Performed by: FAMILY MEDICINE

## 2020-10-23 PROCEDURE — 99214 OFFICE O/P EST MOD 30 MIN: CPT | Mod: HCNC,S$GLB,, | Performed by: NURSE PRACTITIONER

## 2020-10-23 PROCEDURE — 99999 PR PBB SHADOW E&M-EST. PATIENT-LVL III: ICD-10-PCS | Mod: PBBFAC,HCNC,, | Performed by: NURSE PRACTITIONER

## 2020-10-23 PROCEDURE — 3074F PR MOST RECENT SYSTOLIC BLOOD PRESSURE < 130 MM HG: ICD-10-PCS | Mod: HCNC,CPTII,S$GLB, | Performed by: FAMILY MEDICINE

## 2020-10-23 PROCEDURE — 99214 PR OFFICE/OUTPT VISIT, EST, LEVL IV, 30-39 MIN: ICD-10-PCS | Mod: HCNC,S$GLB,, | Performed by: NURSE PRACTITIONER

## 2020-10-23 PROCEDURE — 1125F PR PAIN SEVERITY QUANTIFIED, PAIN PRESENT: ICD-10-PCS | Mod: HCNC,S$GLB,, | Performed by: FAMILY MEDICINE

## 2020-10-23 PROCEDURE — 3078F PR MOST RECENT DIASTOLIC BLOOD PRESSURE < 80 MM HG: ICD-10-PCS | Mod: HCNC,CPTII,S$GLB, | Performed by: FAMILY MEDICINE

## 2020-10-23 PROCEDURE — 3078F DIAST BP <80 MM HG: CPT | Mod: HCNC,CPTII,S$GLB, | Performed by: NURSE PRACTITIONER

## 2020-10-23 PROCEDURE — 3288F FALL RISK ASSESSMENT DOCD: CPT | Mod: HCNC,CPTII,S$GLB, | Performed by: FAMILY MEDICINE

## 2020-10-23 PROCEDURE — 1159F MED LIST DOCD IN RCRD: CPT | Mod: HCNC,S$GLB,, | Performed by: FAMILY MEDICINE

## 2020-10-23 PROCEDURE — 1100F PTFALLS ASSESS-DOCD GE2>/YR: CPT | Mod: HCNC,CPTII,S$GLB, | Performed by: FAMILY MEDICINE

## 2020-10-23 PROCEDURE — 1159F MED LIST DOCD IN RCRD: CPT | Mod: HCNC,S$GLB,, | Performed by: NURSE PRACTITIONER

## 2020-10-23 PROCEDURE — 3074F SYST BP LT 130 MM HG: CPT | Mod: HCNC,CPTII,S$GLB, | Performed by: FAMILY MEDICINE

## 2020-10-23 PROCEDURE — 3074F PR MOST RECENT SYSTOLIC BLOOD PRESSURE < 130 MM HG: ICD-10-PCS | Mod: HCNC,CPTII,S$GLB, | Performed by: NURSE PRACTITIONER

## 2020-10-23 PROCEDURE — 99999 PR PBB SHADOW E&M-EST. PATIENT-LVL IV: CPT | Mod: PBBFAC,HCNC,, | Performed by: FAMILY MEDICINE

## 2020-10-23 PROCEDURE — 3074F SYST BP LT 130 MM HG: CPT | Mod: HCNC,CPTII,S$GLB, | Performed by: NURSE PRACTITIONER

## 2020-10-23 PROCEDURE — 1125F PR PAIN SEVERITY QUANTIFIED, PAIN PRESENT: ICD-10-PCS | Mod: HCNC,S$GLB,, | Performed by: NURSE PRACTITIONER

## 2020-10-23 PROCEDURE — 1101F PR PT FALLS ASSESS DOC 0-1 FALLS W/OUT INJ PAST YR: ICD-10-PCS | Mod: HCNC,CPTII,S$GLB, | Performed by: NURSE PRACTITIONER

## 2020-10-23 PROCEDURE — 1159F PR MEDICATION LIST DOCUMENTED IN MEDICAL RECORD: ICD-10-PCS | Mod: HCNC,S$GLB,, | Performed by: NURSE PRACTITIONER

## 2020-10-23 PROCEDURE — 1100F PR PT FALLS ASSESS DOC 2+ FALLS/FALL W/INJURY/YR: ICD-10-PCS | Mod: HCNC,CPTII,S$GLB, | Performed by: FAMILY MEDICINE

## 2020-10-23 PROCEDURE — 99999 PR PBB SHADOW E&M-EST. PATIENT-LVL IV: ICD-10-PCS | Mod: PBBFAC,HCNC,, | Performed by: FAMILY MEDICINE

## 2020-10-23 PROCEDURE — 3078F PR MOST RECENT DIASTOLIC BLOOD PRESSURE < 80 MM HG: ICD-10-PCS | Mod: HCNC,CPTII,S$GLB, | Performed by: NURSE PRACTITIONER

## 2020-10-23 PROCEDURE — 99999 PR PBB SHADOW E&M-EST. PATIENT-LVL III: CPT | Mod: PBBFAC,HCNC,, | Performed by: NURSE PRACTITIONER

## 2020-10-23 NOTE — PROGRESS NOTES
Subjective:   Patient ID:  Yan Pickering Jr. is a 80 y.o. male who presents for follow up of Chronic atrial fibrillation      HPI  Mr. 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 Required transfusion in Jan 2020 for bleeding varicose vein to left foot  Echo in Jan 2020 showed LVEF 45%, DD      CT abdomen/pelvis showed moderate right hydronephrosis, bilateral inguinal hernias with loops of bowel within the hernia sacs  OP follow up recommended.     Has issues with urinary retention that required him to be DC'd with maharaj     Denies any chest pain, SOB, LUNA,  orthopnea, PND, dizziness, palpitations,  near syncope, syncope or edema . Has no symptoms concerning for angina or equivalent. No CNS Complaints to suggest TIA or CVA.   He feels that his appetite is ok, but can drink more.   BP is low today in clinic. Family states that patient has been requiring more assistance lately. Just has felt weaker now.   Has been taking Lasix 40mg daily due to elevated BNP    Past Medical History:   Diagnosis Date    Atrial fibrillation     Cardiomyopathy 4/26/2017    CHF (congestive heart failure)     CKD (chronic kidney disease) stage 3, GFR 30-59 ml/min     Mild mitral insufficiency     Moderate aortic insufficiency     Venous stasis dermatitis of both lower extremities        History reviewed. No pertinent surgical history.    Social History     Tobacco Use    Smoking status: Never Smoker    Smokeless tobacco: Never Used   Substance Use Topics    Alcohol use: No    Drug use: No       Family History   Problem Relation Age of Onset    Heart disease Father        Current Outpatient Medications   Medication Sig    apixaban (ELIQUIS) 2.5 mg Tab Take 1 tablet (2.5 mg total) by mouth 2 (two) times daily.    furosemide (LASIX) 40 MG tablet Take 1 tablet (40 mg  total) by mouth daily as needed (swelling).    losartan (COZAAR) 25 MG tablet Take 1 tablet (25 mg total) by mouth once daily.    metoprolol tartrate (LOPRESSOR) 100 MG tablet Take 1 tablet (100 mg total) by mouth 2 (two) times daily.    simethicone (MYLICON) 125 mg Cap capsule Take 125 mg by mouth 4 (four) times daily as needed.     No current facility-administered medications for this visit.      Current Outpatient Medications on File Prior to Visit   Medication Sig    apixaban (ELIQUIS) 2.5 mg Tab Take 1 tablet (2.5 mg total) by mouth 2 (two) times daily.    furosemide (LASIX) 40 MG tablet Take 1 tablet (40 mg total) by mouth daily as needed (swelling).    losartan (COZAAR) 25 MG tablet Take 1 tablet (25 mg total) by mouth once daily.    metoprolol tartrate (LOPRESSOR) 100 MG tablet Take 1 tablet (100 mg total) by mouth 2 (two) times daily.    simethicone (MYLICON) 125 mg Cap capsule Take 125 mg by mouth 4 (four) times daily as needed.     No current facility-administered medications on file prior to visit.        ROS    Objective:   Physical Exam   Constitutional: He is oriented to person, place, and time. He appears well-developed and well-nourished.   Neck: Neck supple. No JVD present.   Cardiovascular: Normal rate, normal heart sounds and normal pulses. An irregularly irregular rhythm present. Exam reveals no friction rub.   No murmur heard.  Pulmonary/Chest: Effort normal and breath sounds normal. No respiratory distress. He has no wheezes. He has no rales.   Abdominal: Soft. Bowel sounds are normal. He exhibits no distension.   Genitourinary:    Genitourinary Comments: Bustamante cath in place      Musculoskeletal:         General: No tenderness or edema.      Comments: Significant kyphosis    Neurological: He is alert and oriented to person, place, and time.   Skin: Skin is warm and dry. No rash noted.   Spider veins to BLE   Varicose veins    Psychiatric: He has a normal mood and affect. His behavior  "is normal.   Nursing note and vitals reviewed.    Vitals:    10/23/20 0812   BP: 90/60   BP Location: Left arm   Patient Position: Sitting   BP Method: Medium (Manual)   Pulse: 105   SpO2: 96%   Weight: 51.7 kg (114 lb)   Height: 5' 6" (1.676 m)     Lab Results   Component Value Date    CHOL 169 02/28/2020     Lab Results   Component Value Date    HDL 43 02/28/2020     Lab Results   Component Value Date    LDLCALC 107.4 02/28/2020     Lab Results   Component Value Date    TRIG 93 02/28/2020     Lab Results   Component Value Date    CHOLHDL 25.4 02/28/2020       Chemistry        Component Value Date/Time     10/17/2020 0646    K 3.5 10/17/2020 0646     10/17/2020 0646    CO2 27 10/17/2020 0646    BUN 41 (H) 10/17/2020 0646    CREATININE 1.9 (H) 10/17/2020 0646    GLU 79 10/17/2020 0646        Component Value Date/Time    CALCIUM 8.4 (L) 10/17/2020 0646    ALKPHOS 71 10/15/2020 1040    AST 17 10/15/2020 1040    ALT 10 10/15/2020 1040    BILITOT 1.1 (H) 10/15/2020 1040    ESTGFRAFRICA 37 (A) 10/17/2020 0646    EGFRNONAA 32 (A) 10/17/2020 0646          Lab Results   Component Value Date    TSH 0.973 04/15/2013     Lab Results   Component Value Date    INR 1.2 10/15/2020    INR 1.3 (H) 05/13/2020    INR 1.1 01/23/2020     Lab Results   Component Value Date    WBC 8.24 10/17/2020    HGB 9.1 (L) 10/17/2020    HCT 29.6 (L) 10/17/2020    MCV 96 10/17/2020     10/17/2020     BMP  Sodium   Date Value Ref Range Status   10/17/2020 138 136 - 145 mmol/L Final     Potassium   Date Value Ref Range Status   10/17/2020 3.5 3.5 - 5.1 mmol/L Final     Chloride   Date Value Ref Range Status   10/17/2020 102 95 - 110 mmol/L Final     CO2   Date Value Ref Range Status   10/17/2020 27 23 - 29 mmol/L Final     BUN, Bld   Date Value Ref Range Status   10/17/2020 41 (H) 8 - 23 mg/dL Final     Creatinine   Date Value Ref Range Status   10/17/2020 1.9 (H) 0.5 - 1.4 mg/dL Final     Calcium   Date Value Ref Range Status "   10/17/2020 8.4 (L) 8.7 - 10.5 mg/dL Final     Anion Gap   Date Value Ref Range Status   10/17/2020 9 8 - 16 mmol/L Final     eGFR if    Date Value Ref Range Status   10/17/2020 37 (A) >60 mL/min/1.73 m^2 Final     eGFR if non    Date Value Ref Range Status   10/17/2020 32 (A) >60 mL/min/1.73 m^2 Final     Comment:     Calculation used to obtain the estimated glomerular filtration  rate (eGFR) is the CKD-EPI equation.        Estimated Creatinine Clearance: 21.9 mL/min (A) (based on SCr of 1.9 mg/dL (H)).    Assessment:     1. Chronic atrial fibrillation    2. Moderate aortic insufficiency        Plan:     Continue lasix for now   Will plan to use PRN once BNP improved   Hold Losartan for now to avoid hypotension  Continue metoprolol for A-fib rate control   Eliquis for CVA protection  Repeat BMP and BNP next week   RTC in 3 months

## 2020-10-23 NOTE — PROGRESS NOTES
OPCM outreach to patients friend/Caregiver Chikis Diaz. Advised on services offered and advantages and CG declined enrollment at this time. She requested I mail a letter with my contact information for any possible future needs. NATHANAEL Swartz

## 2020-10-23 NOTE — PROGRESS NOTES
Subjective:       Patient ID: Yan Pickering Jr. is a 80 y.o. male.    Chief Complaint: Follow-up    HPI   80    Past Medical History:   Diagnosis Date    Atrial fibrillation     Cardiomyopathy 4/26/2017    CHF (congestive heart failure)     CKD (chronic kidney disease) stage 3, GFR 30-59 ml/min     Mild mitral insufficiency     Moderate aortic insufficiency     Venous stasis dermatitis of both lower extremities    PMH  CHF - EF 45% / Diastolic Dysfunction (Per 1/24/20 ECHO)  Pulm. HTN  Cardiomyopathy  A. Fib  Anticoagulated  Varicose veins  Venous stasis  Severe kyphosis  CKD III-IV      Has never had surgery    Social History     Tobacco Use   Smoking Status Never Smoker   Smokeless Tobacco Never Used     Family History   Problem Relation Age of Onset    Heart disease Father      Very weak  Ex wife reports needs a lift    She needs something to get him out of the bed.    Has been weak since he left the hospital.    Urinary cath in place for retention    Having some back pain  Ex-wife reports curvature is worsening with his back    Constipation ongoing issue  Stool softner helped    Appetite is good    Patient does not want PT or home health.        Review of Systems   Constitutional: Negative for chills and fever.   HENT: Negative for trouble swallowing.    Respiratory: Negative for shortness of breath.    Cardiovascular: Negative for chest pain.   Gastrointestinal: Positive for constipation.   Genitourinary: Negative for difficulty urinating.   Musculoskeletal: Positive for back pain and gait problem.   Neurological: Negative for dizziness.   Psychiatric/Behavioral: Negative for confusion.       Objective:       Vitals:    10/23/20 0931   BP: 90/60   Pulse: 98   Resp: 18   Temp: 98.9 °F (37.2 °C)       Physical Exam  Constitutional:       General: He is not in acute distress.     Appearance: Normal appearance. He is well-developed.   HENT:      Head: Normocephalic and atraumatic.   Eyes:      General:          Right eye: No discharge.         Left eye: No discharge.      Conjunctiva/sclera: Conjunctivae normal.   Neck:      Musculoskeletal: Full passive range of motion without pain.      Trachea: Trachea normal.   Cardiovascular:      Rate and Rhythm: Normal rate and regular rhythm.      Heart sounds: Normal heart sounds.   Pulmonary:      Effort: Pulmonary effort is normal. No respiratory distress.      Breath sounds: Normal breath sounds. No decreased breath sounds or wheezing.   Abdominal:      Palpations: Abdomen is soft.      Tenderness: There is no abdominal tenderness.   Musculoskeletal:         General: Deformity present.      Comments: Severe kyphosis  Bent over in wheelchair   Lymphadenopathy:      Cervical: No cervical adenopathy.   Skin:     Coloration: Skin is not pale.   Neurological:      Mental Status: He is alert and oriented to person, place, and time.   Psychiatric:         Speech: Speech normal.         Behavior: Behavior normal.         Thought Content: Thought content normal.         Assessment:       1. Debility    2. Hypotension, unspecified hypotension type    3. Urinary retention    4. Atrial fibrillation, chronic    5. Constipation, unspecified constipation type        Plan:     Hypotension  Saw cardiology  Holding ARB    Debility  Wife request lift  Will try and help obtain    Urinary retention  Suspected 2'2 BPH  Has cath in place  Has urology f/u  flomax recommended but not on drug list.  Concern with hypotension - especially given weakness.    A. Fib  Metoprolol  eliquis    CHF  On lasix  euvolemic now    Constipation:  Stool softener helped  Continue use      They are refusing home health or PT at present.     3 weeks followup.

## 2020-10-23 NOTE — LETTER
October 23, 2020    Yan Pickering Jr.  [address]  [address]  [facility] [address]             [facility] [address] [place] [address] Dear Bailey Ladan,     I work with [facility] Outpatient Case Management Department. We received a referral to call you to discuss your medical history. These services are free of charge and are offered to [facility] patients who have recently been discharged from any of our facilities or who have complex medical conditions that may require the skill of a nurse to assist with management.         .      Please call our department wiith any questions regarding your healthcare needs.     The Outpatient Case Management Department can be reached at [phone] from 8:00AM to 4:30 PM on Monday thru Friday. [facility] also has a program where a nurse is available 24/7 to answer questions or provide medical advice, their number is [phone].    Thanks,    Magalie Rivas RN   Direct line: [phone]  Outpatient Care Management

## 2020-10-26 ENCOUNTER — TELEPHONE (OUTPATIENT)
Dept: INTERNAL MEDICINE | Facility: CLINIC | Age: 80
End: 2020-10-26

## 2020-11-02 ENCOUNTER — OFFICE VISIT (OUTPATIENT)
Dept: INTERNAL MEDICINE | Facility: CLINIC | Age: 80
End: 2020-11-02
Payer: MEDICARE

## 2020-11-02 ENCOUNTER — TELEPHONE (OUTPATIENT)
Dept: ORTHOPEDICS | Facility: CLINIC | Age: 80
End: 2020-11-02

## 2020-11-02 DIAGNOSIS — R29.898 UPPER EXTREMITY WEAKNESS: Primary | ICD-10-CM

## 2020-11-02 DIAGNOSIS — M54.6 THORACIC BACK PAIN, UNSPECIFIED BACK PAIN LATERALITY, UNSPECIFIED CHRONICITY: ICD-10-CM

## 2020-11-02 PROCEDURE — 99442 PR PHYSICIAN TELEPHONE EVALUATION 11-20 MIN: ICD-10-PCS | Mod: HCNC,95,, | Performed by: FAMILY MEDICINE

## 2020-11-02 PROCEDURE — 99442 PR PHYSICIAN TELEPHONE EVALUATION 11-20 MIN: CPT | Mod: HCNC,95,, | Performed by: FAMILY MEDICINE

## 2020-11-02 NOTE — PROGRESS NOTES
Subjective:       Patient ID: Yan Pickering Jr. is a 80 y.o. male.    Chief Complaint: No chief complaint on file.    HPI     The patient location is: home  The chief complaint leading to consultation is: back pain    Visit type: audio only    Face to Face time with patient: 10   minutes of total time spent on the encounter, which includes face to face time and non-face to face time preparing to see the patient (eg, review of tests), Obtaining and/or reviewing separately obtained history, Documenting clinical information in the electronic or other health record, Independently interpreting results (not separately reported) and communicating results to the patient/family/caregiver, or Care coordination (not separately reported).         Each patient to whom he or she provides medical services by telemedicine is:  (1) informed of the relationship between the physician and patient and the respective role of any other health care provider with respect to management of the patient; and (2) notified that he or she may decline to receive medical services by telemedicine and may withdraw from such care at any time.    Notes:       Past Medical History:   Diagnosis Date    Atrial fibrillation     Cardiomyopathy 4/26/2017    CHF (congestive heart failure)     CKD (chronic kidney disease) stage 3, GFR 30-59 ml/min     Mild mitral insufficiency     Moderate aortic insufficiency     Venous stasis dermatitis of both lower extremities    A. Fib  Aortic Regurg  CHF EF 30%  Cardiomyopathy  Varicose veins  CKD  Thin Skin  HTN  Severe kyphosis        No past surgical history on file.  Social History     Tobacco Use   Smoking Status Never Smoker   Smokeless Tobacco Never Used     Family History   Problem Relation Age of Onset    Heart disease Father          Learning to use walker  No falling.    Having back pain.  Having hard time getting out of bed.    His ex wife reports - midway up his back - a protrusion has been  noted.    Losing control of his arms.  His arms getting weak.    They are not able to use the lift -         Debility  Severe kyphosis  Lift didn't work  Will try and get hospital bed  Concerning report of worsening spinal pain and UE weakness  Will get neurosurgery evaluation for ideas for optimization.  Limited in options - fearful of pain medication.  Avoid NSAIDs given renal function  Advise start acetaminophen  They continue to defer Home Health or PT - want to discuss with spine MD first.      F/u me within 4 weeks

## 2020-11-03 ENCOUNTER — OFFICE VISIT (OUTPATIENT)
Dept: NEUROSURGERY | Facility: CLINIC | Age: 80
End: 2020-11-03
Payer: MEDICARE

## 2020-11-03 VITALS
BODY MASS INDEX: 18.53 KG/M2 | DIASTOLIC BLOOD PRESSURE: 55 MMHG | SYSTOLIC BLOOD PRESSURE: 80 MMHG | RESPIRATION RATE: 15 BRPM | HEART RATE: 92 BPM | WEIGHT: 115.31 LBS | HEIGHT: 66 IN

## 2020-11-03 DIAGNOSIS — M40.209 KYPHOSIS, UNSPECIFIED KYPHOSIS TYPE, UNSPECIFIED SPINAL REGION: Primary | ICD-10-CM

## 2020-11-03 DIAGNOSIS — R29.898 UPPER EXTREMITY WEAKNESS: ICD-10-CM

## 2020-11-03 PROCEDURE — 99499 UNLISTED E&M SERVICE: CPT | Mod: HCNC,S$GLB,, | Performed by: PHYSICIAN ASSISTANT

## 2020-11-03 PROCEDURE — 99499 NO LOS: ICD-10-PCS | Mod: HCNC,S$GLB,, | Performed by: PHYSICIAN ASSISTANT

## 2020-11-03 PROCEDURE — 99999 PR PBB SHADOW E&M-EST. PATIENT-LVL III: ICD-10-PCS | Mod: PBBFAC,HCNC,, | Performed by: PHYSICIAN ASSISTANT

## 2020-11-03 PROCEDURE — 99999 PR PBB SHADOW E&M-EST. PATIENT-LVL III: CPT | Mod: PBBFAC,HCNC,, | Performed by: PHYSICIAN ASSISTANT

## 2020-11-03 NOTE — LETTER
November 3, 2020      Brenden Issa MD  [address] [place] [address]           [facility]  [address] [place] [id number]  Phone: [phone]  Fax: [phone]          Patient: Yan Pickering Jr.   MR Number: [id number]   YOB: 1940   Date of Visit: 11/3/2020       Dear Dr. Brenden Issa:    Thank you for referring Yan Pickering to me for evaluation. Attached you will find relevant portions of my assessment and plan of care.    If you have questions, please do not hesitate to call me. I look forward to following Yan Pickering along with you.    Sincerely,    Debbi Ch PA-C    Enclosure  CC:  No Recipients    If you would like to receive this communication electronically, please contact [email or web address] or [phone] to request more information on [employer] Mobile Link access.    For providers and/or their staff who would like to refer a patient to [facility], please contact us through our one-stop-shop provider referral line, [facility], at [phone].    If you feel you have received this communication in error or would no longer like to receive these types of communications, please e-mail [email or web address]         
The patient is a 84y Male complaining of chest pain.

## 2020-11-03 NOTE — PROGRESS NOTES
Patient will not be charged a visit.  Wife was under the impression we would be able to provide a brace for the patient in order to help with pain.  Patient and wife also adamant they do not want any surgical intervention and came specifically for a brace.  There is no brace that can help with the patient's kyphosis or fit him correctly in order to help with pain due to the severity of his kyphosis.  I recommended potentially being seen by pain management in the future which they were not interested in at this time.  Unfortunately, there is no neurosurgical intervention that could help this patient.  There was no clear indication for neurosurgical referral.

## 2020-11-04 ENCOUNTER — TELEPHONE (OUTPATIENT)
Dept: INTERNAL MEDICINE | Facility: CLINIC | Age: 80
End: 2020-11-04

## 2020-11-04 ENCOUNTER — HOSPITAL ENCOUNTER (OUTPATIENT)
Dept: RADIOLOGY | Facility: HOSPITAL | Age: 80
Discharge: HOME | End: 2020-11-04
Attending: UROLOGY
Payer: MEDICARE

## 2020-11-04 DIAGNOSIS — R31.9 HEMATURIA, UNSPECIFIED TYPE: ICD-10-CM

## 2020-11-04 PROCEDURE — 74176 CT ABD & PELVIS W/O CONTRAST: CPT | Mod: TC,HCNC

## 2020-11-04 NOTE — TELEPHONE ENCOUNTER
----- Message from Azeb Poon sent at 11/4/2020 12:59 PM CST -----  Regarding: request call back  Spouse request call back, pt visited the neurosurgeon Assistant nothing they can do for pt and if DME was contacted to  lift from home and if pt able to get a hospital bed ... call back: [phone] (home)

## 2020-11-05 ENCOUNTER — LAB VISIT (OUTPATIENT)
Dept: LAB | Facility: HOSPITAL | Age: 80
End: 2020-11-05
Attending: NURSE PRACTITIONER
Payer: MEDICARE

## 2020-11-05 ENCOUNTER — OFFICE VISIT (OUTPATIENT)
Dept: UROLOGY | Facility: CLINIC | Age: 80
End: 2020-11-05
Payer: MEDICARE

## 2020-11-05 VITALS
TEMPERATURE: 99 F | SYSTOLIC BLOOD PRESSURE: 98 MMHG | BODY MASS INDEX: 18.61 KG/M2 | DIASTOLIC BLOOD PRESSURE: 60 MMHG | WEIGHT: 115.31 LBS

## 2020-11-05 DIAGNOSIS — I48.20 CHRONIC ATRIAL FIBRILLATION: ICD-10-CM

## 2020-11-05 DIAGNOSIS — R33.9 URINARY RETENTION: Primary | ICD-10-CM

## 2020-11-05 DIAGNOSIS — I42.9 CARDIOMYOPATHY, UNSPECIFIED TYPE: ICD-10-CM

## 2020-11-05 LAB
ALBUMIN SERPL BCP-MCNC: 3.3 G/DL (ref 3.5–5.2)
ALP SERPL-CCNC: 99 U/L (ref 55–135)
ALT SERPL W/O P-5'-P-CCNC: 12 U/L (ref 10–44)
ANION GAP SERPL CALC-SCNC: 12 MMOL/L (ref 8–16)
AST SERPL-CCNC: 14 U/L (ref 10–40)
BILIRUB SERPL-MCNC: 0.5 MG/DL (ref 0.1–1)
BNP SERPL-MCNC: 672 PG/ML (ref 0–99)
BUN SERPL-MCNC: 48 MG/DL (ref 8–23)
CALCIUM SERPL-MCNC: 9.2 MG/DL (ref 8.7–10.5)
CHLORIDE SERPL-SCNC: 100 MMOL/L (ref 95–110)
CO2 SERPL-SCNC: 26 MMOL/L (ref 23–29)
CREAT SERPL-MCNC: 2.3 MG/DL (ref 0.5–1.4)
EST. GFR  (AFRICAN AMERICAN): 29 ML/MIN/1.73 M^2
EST. GFR  (NON AFRICAN AMERICAN): 25 ML/MIN/1.73 M^2
GLUCOSE SERPL-MCNC: 85 MG/DL (ref 70–110)
POTASSIUM SERPL-SCNC: 4.3 MMOL/L (ref 3.5–5.1)
PROT SERPL-MCNC: 7.3 G/DL (ref 6–8.4)
SODIUM SERPL-SCNC: 138 MMOL/L (ref 136–145)

## 2020-11-05 PROCEDURE — 83880 ASSAY OF NATRIURETIC PEPTIDE: CPT | Mod: HCNC

## 2020-11-05 PROCEDURE — 1101F PT FALLS ASSESS-DOCD LE1/YR: CPT | Mod: HCNC,CPTII,S$GLB, | Performed by: UROLOGY

## 2020-11-05 PROCEDURE — 1126F AMNT PAIN NOTED NONE PRSNT: CPT | Mod: HCNC,S$GLB,, | Performed by: UROLOGY

## 2020-11-05 PROCEDURE — 3078F DIAST BP <80 MM HG: CPT | Mod: HCNC,CPTII,S$GLB, | Performed by: UROLOGY

## 2020-11-05 PROCEDURE — 1101F PR PT FALLS ASSESS DOC 0-1 FALLS W/OUT INJ PAST YR: ICD-10-PCS | Mod: HCNC,CPTII,S$GLB, | Performed by: UROLOGY

## 2020-11-05 PROCEDURE — 1159F MED LIST DOCD IN RCRD: CPT | Mod: HCNC,S$GLB,, | Performed by: UROLOGY

## 2020-11-05 PROCEDURE — 99999 PR PBB SHADOW E&M-EST. PATIENT-LVL III: ICD-10-PCS | Mod: PBBFAC,HCNC,, | Performed by: UROLOGY

## 2020-11-05 PROCEDURE — 3074F SYST BP LT 130 MM HG: CPT | Mod: HCNC,CPTII,S$GLB, | Performed by: UROLOGY

## 2020-11-05 PROCEDURE — 36415 COLL VENOUS BLD VENIPUNCTURE: CPT | Mod: HCNC

## 2020-11-05 PROCEDURE — 99499 RISK ADDL DX/OHS AUDIT: ICD-10-PCS | Mod: S$GLB,,, | Performed by: UROLOGY

## 2020-11-05 PROCEDURE — 99213 OFFICE O/P EST LOW 20 MIN: CPT | Mod: HCNC,25,S$GLB, | Performed by: UROLOGY

## 2020-11-05 PROCEDURE — 1126F PR PAIN SEVERITY QUANTIFIED, NO PAIN PRESENT: ICD-10-PCS | Mod: HCNC,S$GLB,, | Performed by: UROLOGY

## 2020-11-05 PROCEDURE — 99999 PR PBB SHADOW E&M-EST. PATIENT-LVL III: CPT | Mod: PBBFAC,HCNC,, | Performed by: UROLOGY

## 2020-11-05 PROCEDURE — 99213 PR OFFICE/OUTPT VISIT, EST, LEVL III, 20-29 MIN: ICD-10-PCS | Mod: HCNC,25,S$GLB, | Performed by: UROLOGY

## 2020-11-05 PROCEDURE — 3078F PR MOST RECENT DIASTOLIC BLOOD PRESSURE < 80 MM HG: ICD-10-PCS | Mod: HCNC,CPTII,S$GLB, | Performed by: UROLOGY

## 2020-11-05 PROCEDURE — 80053 COMPREHEN METABOLIC PANEL: CPT | Mod: HCNC

## 2020-11-05 PROCEDURE — 99499 UNLISTED E&M SERVICE: CPT | Mod: S$GLB,,, | Performed by: UROLOGY

## 2020-11-05 PROCEDURE — 1159F PR MEDICATION LIST DOCUMENTED IN MEDICAL RECORD: ICD-10-PCS | Mod: HCNC,S$GLB,, | Performed by: UROLOGY

## 2020-11-05 PROCEDURE — 3074F PR MOST RECENT SYSTOLIC BLOOD PRESSURE < 130 MM HG: ICD-10-PCS | Mod: HCNC,CPTII,S$GLB, | Performed by: UROLOGY

## 2020-11-05 RX ORDER — FUROSEMIDE 40 MG/1
TABLET ORAL
Qty: 15 TABLET | Refills: 11 | Status: ON HOLD
Start: 2020-11-05 | End: 2020-12-10 | Stop reason: SDUPTHER

## 2020-11-05 NOTE — PROGRESS NOTES
Chief Complaint:  Urinary retention    HPI:   Yan Pickering Jr. is a 80 y.o. male with past medical history AFib, CHF, CKD, mitral insufficiency, and cardiomyopathy the that presents today as a follow-up for urinary retention.  Patient was admitted to the hospital for urinary retention from 10 15-10 17.  He had a Bustamante catheter placed at that time due to right-sided hydronephrosis, which was new from prior CT scans.  Patient also has a right pelvic kidney.  Patient had denied having any urinary issues, however his bladder was very distended on the CT scan, as well as prior CT scans indicative of long-term obstruction.  Patient presents today for follow-up and voiding trial.  He notes some mild discomfort from the catheter but overall is not been too bothersome.  He denies any gross hematuria or fevers.     PMH:  Past Medical History:   Diagnosis Date    Atrial fibrillation     Cardiomyopathy 4/26/2017    CHF (congestive heart failure)     CKD (chronic kidney disease) stage 3, GFR 30-59 ml/min     Mild mitral insufficiency     Moderate aortic insufficiency     Venous stasis dermatitis of both lower extremities        PSH:  No past surgical history on file.    Family History:  Family History   Problem Relation Age of Onset    Heart disease Father        Social History:  Social History     Tobacco Use    Smoking status: Never Smoker    Smokeless tobacco: Never Used   Substance Use Topics    Alcohol use: No    Drug use: No        Review of Systems:  General: No fever, chills  Skin: No rashes  Chest:  Denies cough and sputum production  Heart: Denies chest pain  Resp: Denies dyspnea  Abdomen: Denies diarrhea, abdominal pain, hematemesis, or blood in stool.  Musculoskeletal:  Significant kyphosis  : see HPI  Neuro: no dizziness or weakness    Allergies:  Doxycycline    Medications:    Current Outpatient Medications:     apixaban (ELIQUIS) 2.5 mg Tab, Take 1 tablet (2.5 mg total) by mouth 2 (two) times  daily., Disp: 60 tablet, Rfl: 11    furosemide (LASIX) 40 MG tablet, Take 1 tablet (40 mg total) by mouth daily as needed (swelling)., Disp: 15 tablet, Rfl: 11    metoprolol tartrate (LOPRESSOR) 100 MG tablet, Take 1 tablet (100 mg total) by mouth 2 (two) times daily., Disp: 60 tablet, Rfl: 11    Physical Exam:  Vitals:    11/05/20 0957   BP: 98/60   Temp: 99 °F (37.2 °C)     General: awake, alert, cooperative  Head: NC/AT  Ears: external ears normal  Eyes: sclera normal  Lungs: normal inspiration, NAD  Heart: well-perfused  Abdomen: Soft, NT, ND  Skin: The skin is warm and dry.  Ext: No c/c/e.    RADIOLOGY:  CT RENAL STONE STUDY ABD PELVIS WO 11/04/2020  Prostate volume = 60ml     CLINICAL HISTORY:  Hematuria, unknown cause;     TECHNIQUE:  Routine 5 mm non-contrast images of the abdomen and pelvis were done.  Sagittal and coronal reformats were also submitted for interpretation.     COMPARISON:  10/15/2020     FINDINGS:  Evaluation of solid organ and vascular pathology is limited due to lack of IV contrast.     Atelectasis or scarring is seen at the left lung base.  No effusion.  Stable cardiomegaly.  Small pericardial effusion.  IVC and hepatic veins are prominent suggesting right heart dysfunction.     The liver is normal in size and attenuation.  Multiple cysts are seen within the liver..  The gallbladder shows no evidence of stones or pericholecystic fluid.  There is no intra-or extrahepatic biliary ductal dilatation.     The spleen, pancreas, and adrenal glands are unremarkable.     Bilateral renal cortical thinning.  Stable 3.9 cm presumed cyst within the lower pole left kidney and 6.4 cm cyst within the lower pole the right kidney.  No definite solid renal mass although evaluation is limited without contrast.  Bustamante catheter is seen within the urinary bladder.  Moderate bladder wall thickening which can be seen with chronic outlet obstruction or cystitis.  Prostate is enlarged measuring up to 5.3  cm.     Stomach is unremarkable.   The visualized loops of small bowel show no evidence of obstruction or inflammation. Large bowel demonstrates moderate constipation.  Moderate diverticulosis within the sigmoid colon.  No evidence of appendicitis.  There is no ascites, free fluid, or intraperitoneal free air.     Shotty nodes are seen within the mesentery and retroperitoneum which are likely reactive.  Moderate bilateral direct inguinal hernias containing small bowel loops but without evidence of obstruction.     The abdominal aorta is normal in course and caliber with moderate atherosclerotic calcifications.     Diffuse osteopenia.  Subacute fracture is seen at L1 new from prior study.  There is approximately 20% loss of vertebral body height.  No evidence of retropulsion.  MRI can be obtained for further characterization.  Well corticated lytic lesion is again seen within the right superior pubic ramus.     Impression:     Subacute fracture is seen at L1 new from prior study. MRI can be obtained for further characterization.     Bustamante catheter is seen within the urinary bladder. Moderate bladder wall thickening which can be seen with chronic outlet obstruction or cystitis.  Bladder appears to be most likely source for hematuria.     Evaluation of solid organ and vascular pathology is limited due to lack of IV contrast.  See additional findings above.    LABS:  I personally reviewed the following lab values:  Lab Results   Component Value Date    WBC 8.24 10/17/2020    HGB 9.1 (L) 10/17/2020    HCT 29.6 (L) 10/17/2020     10/17/2020     11/05/2020    K 4.3 11/05/2020     11/05/2020    CREATININE 2.3 (H) 11/05/2020    BUN 48 (H) 11/05/2020    CO2 26 11/05/2020    TSH 0.973 04/15/2013    INR 1.2 10/15/2020    CHOL 169 02/28/2020    TRIG 93 02/28/2020    HDL 43 02/28/2020    ALT 12 11/05/2020    AST 14 11/05/2020       Assessment/Plan:   Yan Pickering Jr. is a 80 y.o. male with likely long-standing  retention.  I had a long discussion with the patient regarding his retention.  He I reviewed management of retention including a chronic catheterization, intermittent catheterization, and undergoing an outlet procedure.  For outlet procedures, I reviewed TURP, GreenLight PVP, urolift, and robotic simple prostatectomy.  His prostate is not large enough to require simple prostatectomy.  Unfortunately the patient is also not very healthy, with him having a history of CHF and AFib on Eliquis.  I reviewed options for management of his retention, and the patient would like to undergo a voiding trial today, so his Bustamante catheter was removed.  We also instructed the patient how to perform intermittent catheterization, so if he is unable to urinate, he will not have to return to an outside facility for placement of a catheter.  I will send a message to the patient's cardiologist inquiring whether the patient would be an appropriate candidate for a procedure.  We will have him follow up in 1 month.    Thank you for allowing me the opportunity to participate in this patient's care.     Joaquim Pritchett MD  Urology

## 2020-11-07 ENCOUNTER — HOSPITAL ENCOUNTER (EMERGENCY)
Facility: HOSPITAL | Age: 80
Discharge: HOME | End: 2020-11-07
Attending: EMERGENCY MEDICINE
Payer: MEDICARE

## 2020-11-07 VITALS
OXYGEN SATURATION: 97 % | DIASTOLIC BLOOD PRESSURE: 57 MMHG | BODY MASS INDEX: 18.61 KG/M2 | HEIGHT: 66 IN | HEART RATE: 99 BPM | RESPIRATION RATE: 17 BRPM | TEMPERATURE: 98 F | SYSTOLIC BLOOD PRESSURE: 109 MMHG

## 2020-11-07 DIAGNOSIS — R31.9 HEMATURIA, UNSPECIFIED TYPE: Primary | ICD-10-CM

## 2020-11-07 LAB
BACTERIA #/AREA URNS HPF: ABNORMAL /HPF
BILIRUB UR QL STRIP: NEGATIVE
CLARITY UR: CLEAR
COLOR UR: YELLOW
GLUCOSE UR QL STRIP: NEGATIVE
HGB UR QL STRIP: ABNORMAL
KETONES UR QL STRIP: NEGATIVE
LEUKOCYTE ESTERASE UR QL STRIP: NEGATIVE
MICROSCOPIC COMMENT: ABNORMAL
NITRITE UR QL STRIP: NEGATIVE
PH UR STRIP: 6 [PH] (ref 5–8)
PROT UR QL STRIP: ABNORMAL
RBC #/AREA URNS HPF: 40 /HPF (ref 0–4)
SP GR UR STRIP: 1.01 (ref 1–1.03)
URN SPEC COLLECT METH UR: ABNORMAL
UROBILINOGEN UR STRIP-ACNC: NEGATIVE EU/DL
WBC #/AREA URNS HPF: 1 /HPF (ref 0–5)

## 2020-11-07 PROCEDURE — 99283 EMERGENCY DEPT VISIT LOW MDM: CPT | Mod: HCNC

## 2020-11-07 PROCEDURE — 81000 URINALYSIS NONAUTO W/SCOPE: CPT | Mod: HCNC

## 2020-11-07 NOTE — ED NOTES
Pt reports having an indwelling catheter placed by his doctor around 2 weeks ago, and his doctor removed the catheter then sent the patient home to self-cath. Pt states he attempted to self-cath tonight and noticed blood in his urine.

## 2020-11-07 NOTE — DISCHARGE INSTRUCTIONS
Regarding HEMATURIA,  I instructed patient to report: any discomfort with urination, urinary frequency or urgency; unexplained weight loss; develop fever, nausea, vomiting, shaking chills, or pain in your abdomen, side, or back; are unable to urinate; are passing blood clots in urine; or have urine dribbling, nighttime urination, or difficulty starting urine flow.

## 2020-11-07 NOTE — ED PROVIDER NOTES
SCRIBE #1 NOTE: I, Amalia Little, am scribing for, and in the presence of, Blayne Nichole Jr., MD. I have scribed the entire note.       History     Chief Complaint   Patient presents with    Hematuria     Pt Reports blood in urine this morning; Had maharaj removed last Thursday. Self cathed for the first time last night.      Review of patient's allergies indicates:   Allergen Reactions    Doxycycline Other (See Comments)     abd pain, bloating         History of Present Illness     HPI    11/7/2020, 4:06 AM  History obtained from the patient      History of Present Illness: Yan Pickering Jr. is a 80 y.o. male patient with a PMHx of CKD, CHF, A-fib, Mild mitral insufficiency, moderate aortic insufficiency, and venous stasis dermatitis of both lower extremities, who presents to the Emergency Department for evaluation of hematuria which onset yesterday. Symptoms are constant and moderate in severity. Pt had a catheter placed 2 weeks ago then removed 2 yesterday and has f/u with urologist soon. Pt reports having a good urination for the first time in a while, but is now bleeding since he tried to self-cath 8 hours ago. Pt states his ex-wife was helping him and it took 4 to 5 tries, which might have caused the bleeding. Pt reports following cleaning procedures properly and doctor's instructions. No associated sxs reported. Patient denies any fever, cough, chills, CP, SOB, n/v/d, dysuria, and all other sxs at this time. No further complaints or concerns at this time.       Arrival mode: Personal vehicle      PCP: Brenden Issa MD        Past Medical History:  Past Medical History:   Diagnosis Date    Atrial fibrillation     Cardiomyopathy 4/26/2017    CHF (congestive heart failure)     CKD (chronic kidney disease) stage 3, GFR 30-59 ml/min     Mild mitral insufficiency     Moderate aortic insufficiency     Venous stasis dermatitis of both lower extremities        Past Surgical History:  No past surgical  history on file.      Family History:  Family History   Problem Relation Age of Onset    Heart disease Father        Social History:  Social History     Tobacco Use    Smoking status: Never Smoker    Smokeless tobacco: Never Used   Substance and Sexual Activity    Alcohol use: No    Drug use: No    Sexual activity: Not Currently        Review of Systems     Review of Systems   Constitutional: Negative for chills and fever.   HENT: Negative for sore throat.    Respiratory: Negative for cough and shortness of breath.    Cardiovascular: Negative for chest pain.   Gastrointestinal: Negative for diarrhea, nausea and vomiting.   Genitourinary: Positive for hematuria. Negative for dysuria.   Musculoskeletal: Negative for back pain.   Skin: Negative for rash.   Neurological: Negative for weakness.   Hematological: Does not bruise/bleed easily.   All other systems reviewed and are negative.       Physical Exam     Initial Vitals [11/07/20 0302]   BP Pulse Resp Temp SpO2   (!) 99/57 (!) 115 20 98.2 °F (36.8 °C) 100 %      MAP       --          Physical Exam  Nursing Notes and Vital Signs Reviewed.  Constitutional: Patient is in no apparent distress. Elderly.  Head: Atraumatic. Normocephalic.  Eyes: PERRL. EOM intact. Conjunctivae are not pale. No scleral icterus.  ENT: Mucous membranes are moist. Oropharynx is clear and symmetric.    Neck: Supple. Full ROM. No lymphadenopathy.  Cardiovascular: Regular rate. Regular rhythm. No murmurs, rubs, or gallops. Distal pulses are 2+ and symmetric.  Pulmonary/Chest: No respiratory distress. Clear to auscultation bilaterally. No wheezing or rales.  Abdominal: Soft and non-distended.  There is no tenderness.  No rebound, guarding, or rigidity. Good bowel sounds.  Genitourinary: No CVA tenderness. No palpable bladder.  Musculoskeletal: Moves all extremities. No obvious deformities. No edema. No calf tenderness.  Skin: Warm and dry.  Neurological:  Alert, awake, and appropriate.   "Normal speech.  No acute focal neurological deficits are appreciated.  Psychiatric: Normal affect. Good eye contact. Appropriate in content.     ED Course   Procedures  ED Vital Signs:  Vitals:    11/07/20 0302 11/07/20 0318 11/07/20 0331 11/07/20 0402   BP: (!) 99/57 (!) 115/59 (!) 99/53 (!) 99/56   Pulse: (!) 115 99 96 94   Resp: 20 16 18 19   Temp: 98.2 °F (36.8 °C)      TempSrc: Oral      SpO2: 100% 97% 99% 100%   Height: 5' 6" (1.676 m)       11/07/20 0431 11/07/20 0459   BP: (!) 101/55 (!) 109/57   Pulse: 98 99   Resp: 15 17   Temp:     TempSrc:     SpO2: 100% 97%   Height:         Abnormal Lab Results:  Labs Reviewed   URINALYSIS, REFLEX TO URINE CULTURE - Abnormal; Notable for the following components:       Result Value    Protein, UA Trace (*)     Occult Blood UA 3+ (*)     All other components within normal limits    Narrative:     Specimen Source->Urine   URINALYSIS MICROSCOPIC - Abnormal; Notable for the following components:    RBC, UA 40 (*)     All other components within normal limits    Narrative:     Specimen Source->Urine        All Lab Results:  Results for orders placed or performed during the hospital encounter of 11/07/20   Urinalysis, Reflex to Urine Culture Urine, Clean Catch    Specimen: Urine   Result Value Ref Range    Specimen UA Urine, Clean Catch     Color, UA Yellow Yellow, Straw, Vero    Appearance, UA Clear Clear    pH, UA 6.0 5.0 - 8.0    Specific Gravity, UA 1.015 1.005 - 1.030    Protein, UA Trace (A) Negative    Glucose, UA Negative Negative    Ketones, UA Negative Negative    Bilirubin (UA) Negative Negative    Occult Blood UA 3+ (A) Negative    Nitrite, UA Negative Negative    Urobilinogen, UA Negative <2.0 EU/dL    Leukocytes, UA Negative Negative   Urinalysis Microscopic   Result Value Ref Range    RBC, UA 40 (H) 0 - 4 /hpf    WBC, UA 1 0 - 5 /hpf    Bacteria Rare None-Occ /hpf    Microscopic Comment SEE COMMENT          Imaging Results:  Imaging Results    None      "              The Emergency Provider reviewed the vital signs and test results, which are outlined above.     ED Discussion       4:58 AM: Reassessed pt at this time. Discussed with pt all pertinent ED information and results. Discussed pt dx and plan of tx. Gave pt all f/u and return to the ED instructions. All questions and concerns were addressed at this time. Pt expresses understanding of information and instructions, and is comfortable with plan to discharge. Pt is stable for discharge.    I discussed with patient and/or family/caretaker that evaluation in the ED does not suggest any emergent or life threatening medical conditions requiring immediate intervention beyond what was provided in the ED, and I believe patient is safe for discharge.  Regardless, an unremarkable evaluation in the ED does not preclude the development or presence of a serious of life threatening condition. As such, patient was instructed to return immediately for any worsening or change in current symptoms.    Regarding HEMATURIA,  I instructed patient to report: any discomfort with urination, urinary frequency or urgency; unexplained weight loss; develop fever, nausea, vomiting, shaking chills, or pain in your abdomen, side, or back; are unable to urinate; are passing blood clots in urine; or have urine dribbling, nighttime urination, or difficulty starting urine flow.        Medical Decision Making:   Clinical Tests:   Lab Tests: Ordered and Reviewed           ED Medication(s):  Medications - No data to display    Discharge Medication List as of 11/7/2020  4:50 AM          Follow-up Information     Brenden Issa MD.    Specialty: Family Medicine  Why: As needed, If symptoms worsen  Contact information:  [address] [facility] [address]  [phone]             [facility] - .    Specialty: Emergency Medicine  Why: As needed, If symptoms worsen  Contact information:  [address] [facility]  [facility] [id number] [phone]           [facility]. Schedule an appointment as soon as possible for a visit in 1 week.    Specialty: Urology  Contact information:  [address] [facility] [address] [phone]  Additional information:  3rd Floor - From [address], take the [facility] exit ([address]). Enter the facility from the [address]                     Scribe Attestation:   Scribe #1: I performed the above scribed service and the documentation accurately describes the services I performed. I attest to the accuracy of the note.     Attending:   Physician Attestation Statement for Scribe #1: I, Blayne Nichole Jr., MD, personally performed the services described in this documentation, as scribed by Amalia Little, in my presence, and it is both accurate and complete.           Clinical Impression       ICD-10-CM ICD-9-CM   1. Hematuria, unspecified type  R31.9 599.70       Disposition:   Disposition: Discharged  Condition: Stable         Blayne Nichole Jr., MD  11/08/20 3494

## 2020-11-09 ENCOUNTER — TELEPHONE (OUTPATIENT)
Dept: UROLOGY | Facility: CLINIC | Age: 80
End: 2020-11-09

## 2020-11-13 ENCOUNTER — CLINICAL SUPPORT (OUTPATIENT)
Dept: UROLOGY | Facility: CLINIC | Age: 80
End: 2020-11-13
Payer: MEDICARE

## 2020-11-16 NOTE — PROGRESS NOTES
Pt here for maharaj placement.  Pt is to CIC at home and wife states this is the second time that she tries to cath him and he started bleeding.  Using sterile technique I inserted a 16f indwelling maharaj without difficulty. Clear urine return.  I then inflated the 10cc balloon.  Pt tolerated the procedure well.

## 2020-11-19 ENCOUNTER — TELEPHONE (OUTPATIENT)
Dept: INTERNAL MEDICINE | Facility: CLINIC | Age: 80
End: 2020-11-19

## 2020-11-19 ENCOUNTER — TELEPHONE (OUTPATIENT)
Dept: UROLOGY | Facility: CLINIC | Age: 80
End: 2020-11-19

## 2020-11-19 NOTE — TELEPHONE ENCOUNTER
Called and spoke with Chikis.  She explains that pt has blood in his cath tubing off and on everyday.  Catheter is draining with no problem.  I spoke with Dr Pritchett and he states that can be normal and that we will do a cysto on him when he comes in for his appointment on 12/3 we will do a cysto on him.  Chikis voices understanding

## 2020-11-19 NOTE — TELEPHONE ENCOUNTER
----- Message from Yuli Eldridge sent at 11/19/2020  1:26 PM CST -----  Regarding: blood in urine  Contact: patient  States patient have blood in his urine and sore. Please call and advise ASAP TODAY URGENT!! @ [phone]. THANKS.

## 2020-11-19 NOTE — TELEPHONE ENCOUNTER
----- Message from Yuli Eldridge sent at 11/19/2020  1:26 PM CST -----  Regarding: blood in urine  Contact: patient  States patient have blood in his urine and sore. Please call and advise ASAP TODAY URGENT!! @ [phone]. THANKS.

## 2020-11-20 ENCOUNTER — OFFICE VISIT (OUTPATIENT)
Dept: INTERNAL MEDICINE | Facility: CLINIC | Age: 80
End: 2020-11-20
Payer: MEDICARE

## 2020-11-20 ENCOUNTER — LAB VISIT (OUTPATIENT)
Dept: LAB | Facility: HOSPITAL | Age: 80
End: 2020-11-20
Attending: FAMILY MEDICINE
Payer: MEDICARE

## 2020-11-20 VITALS
BODY MASS INDEX: 17.79 KG/M2 | DIASTOLIC BLOOD PRESSURE: 60 MMHG | SYSTOLIC BLOOD PRESSURE: 105 MMHG | HEART RATE: 81 BPM | TEMPERATURE: 99 F | HEIGHT: 66 IN | OXYGEN SATURATION: 96 % | WEIGHT: 110.69 LBS

## 2020-11-20 DIAGNOSIS — N18.4 CKD (CHRONIC KIDNEY DISEASE), STAGE IV: ICD-10-CM

## 2020-11-20 DIAGNOSIS — R31.9 HEMATURIA, UNSPECIFIED TYPE: Primary | ICD-10-CM

## 2020-11-20 DIAGNOSIS — D64.9 ANEMIA, UNSPECIFIED TYPE: ICD-10-CM

## 2020-11-20 DIAGNOSIS — I48.91 ATRIAL FIBRILLATION, UNSPECIFIED TYPE: ICD-10-CM

## 2020-11-20 DIAGNOSIS — Z79.01 ANTICOAGULATED: ICD-10-CM

## 2020-11-20 DIAGNOSIS — R31.9 HEMATURIA, UNSPECIFIED TYPE: ICD-10-CM

## 2020-11-20 LAB
ALBUMIN SERPL BCP-MCNC: 3.3 G/DL (ref 3.5–5.2)
ALP SERPL-CCNC: 104 U/L (ref 55–135)
ALT SERPL W/O P-5'-P-CCNC: 14 U/L (ref 10–44)
ANION GAP SERPL CALC-SCNC: 7 MMOL/L (ref 8–16)
AST SERPL-CCNC: 18 U/L (ref 10–40)
BASOPHILS # BLD AUTO: 0.04 K/UL (ref 0–0.2)
BASOPHILS NFR BLD: 0.7 % (ref 0–1.9)
BILIRUB SERPL-MCNC: 0.5 MG/DL (ref 0.1–1)
BUN SERPL-MCNC: 32 MG/DL (ref 8–23)
CALCIUM SERPL-MCNC: 8.9 MG/DL (ref 8.7–10.5)
CHLORIDE SERPL-SCNC: 105 MMOL/L (ref 95–110)
CO2 SERPL-SCNC: 30 MMOL/L (ref 23–29)
CREAT SERPL-MCNC: 2 MG/DL (ref 0.5–1.4)
DIFFERENTIAL METHOD: ABNORMAL
EOSINOPHIL # BLD AUTO: 0.1 K/UL (ref 0–0.5)
EOSINOPHIL NFR BLD: 1 % (ref 0–8)
ERYTHROCYTE [DISTWIDTH] IN BLOOD BY AUTOMATED COUNT: 16.5 % (ref 11.5–14.5)
EST. GFR  (AFRICAN AMERICAN): 34.9 ML/MIN/1.73 M^2
EST. GFR  (NON AFRICAN AMERICAN): 30.2 ML/MIN/1.73 M^2
GLUCOSE SERPL-MCNC: 86 MG/DL (ref 70–110)
HCT VFR BLD AUTO: 29.4 % (ref 40–54)
HGB BLD-MCNC: 8.2 G/DL (ref 14–18)
IMM GRANULOCYTES # BLD AUTO: 0.03 K/UL (ref 0–0.04)
IMM GRANULOCYTES NFR BLD AUTO: 0.5 % (ref 0–0.5)
LYMPHOCYTES # BLD AUTO: 1.1 K/UL (ref 1–4.8)
LYMPHOCYTES NFR BLD: 18.3 % (ref 18–48)
MCH RBC QN AUTO: 29.3 PG (ref 27–31)
MCHC RBC AUTO-ENTMCNC: 27.9 G/DL (ref 32–36)
MCV RBC AUTO: 105 FL (ref 82–98)
MONOCYTES # BLD AUTO: 0.8 K/UL (ref 0.3–1)
MONOCYTES NFR BLD: 13.8 % (ref 4–15)
NEUTROPHILS # BLD AUTO: 3.9 K/UL (ref 1.8–7.7)
NEUTROPHILS NFR BLD: 65.7 % (ref 38–73)
NRBC BLD-RTO: 0 /100 WBC
PLATELET # BLD AUTO: 136 K/UL (ref 150–350)
PMV BLD AUTO: 11.1 FL (ref 9.2–12.9)
POTASSIUM SERPL-SCNC: 4.7 MMOL/L (ref 3.5–5.1)
PROT SERPL-MCNC: 6.8 G/DL (ref 6–8.4)
RBC # BLD AUTO: 2.8 M/UL (ref 4.6–6.2)
SODIUM SERPL-SCNC: 142 MMOL/L (ref 136–145)
WBC # BLD AUTO: 5.96 K/UL (ref 3.9–12.7)

## 2020-11-20 PROCEDURE — 99214 OFFICE O/P EST MOD 30 MIN: CPT | Mod: HCNC,S$GLB,, | Performed by: FAMILY MEDICINE

## 2020-11-20 PROCEDURE — 1101F PR PT FALLS ASSESS DOC 0-1 FALLS W/OUT INJ PAST YR: ICD-10-PCS | Mod: HCNC,CPTII,S$GLB, | Performed by: FAMILY MEDICINE

## 2020-11-20 PROCEDURE — 99999 PR PBB SHADOW E&M-EST. PATIENT-LVL III: CPT | Mod: PBBFAC,HCNC,, | Performed by: FAMILY MEDICINE

## 2020-11-20 PROCEDURE — 80053 COMPREHEN METABOLIC PANEL: CPT | Mod: HCNC

## 2020-11-20 PROCEDURE — 1101F PT FALLS ASSESS-DOCD LE1/YR: CPT | Mod: HCNC,CPTII,S$GLB, | Performed by: FAMILY MEDICINE

## 2020-11-20 PROCEDURE — 99999 PR PBB SHADOW E&M-EST. PATIENT-LVL III: ICD-10-PCS | Mod: PBBFAC,HCNC,, | Performed by: FAMILY MEDICINE

## 2020-11-20 PROCEDURE — 3078F PR MOST RECENT DIASTOLIC BLOOD PRESSURE < 80 MM HG: ICD-10-PCS | Mod: HCNC,CPTII,S$GLB, | Performed by: FAMILY MEDICINE

## 2020-11-20 PROCEDURE — 3074F PR MOST RECENT SYSTOLIC BLOOD PRESSURE < 130 MM HG: ICD-10-PCS | Mod: HCNC,CPTII,S$GLB, | Performed by: FAMILY MEDICINE

## 2020-11-20 PROCEDURE — 3074F SYST BP LT 130 MM HG: CPT | Mod: HCNC,CPTII,S$GLB, | Performed by: FAMILY MEDICINE

## 2020-11-20 PROCEDURE — 99214 PR OFFICE/OUTPT VISIT, EST, LEVL IV, 30-39 MIN: ICD-10-PCS | Mod: HCNC,S$GLB,, | Performed by: FAMILY MEDICINE

## 2020-11-20 PROCEDURE — 3288F FALL RISK ASSESSMENT DOCD: CPT | Mod: HCNC,CPTII,S$GLB, | Performed by: FAMILY MEDICINE

## 2020-11-20 PROCEDURE — 1159F MED LIST DOCD IN RCRD: CPT | Mod: HCNC,S$GLB,, | Performed by: FAMILY MEDICINE

## 2020-11-20 PROCEDURE — 1159F PR MEDICATION LIST DOCUMENTED IN MEDICAL RECORD: ICD-10-PCS | Mod: HCNC,S$GLB,, | Performed by: FAMILY MEDICINE

## 2020-11-20 PROCEDURE — 1126F PR PAIN SEVERITY QUANTIFIED, NO PAIN PRESENT: ICD-10-PCS | Mod: HCNC,S$GLB,, | Performed by: FAMILY MEDICINE

## 2020-11-20 PROCEDURE — 36415 COLL VENOUS BLD VENIPUNCTURE: CPT | Mod: HCNC

## 2020-11-20 PROCEDURE — 85025 COMPLETE CBC W/AUTO DIFF WBC: CPT | Mod: HCNC

## 2020-11-20 PROCEDURE — 1126F AMNT PAIN NOTED NONE PRSNT: CPT | Mod: HCNC,S$GLB,, | Performed by: FAMILY MEDICINE

## 2020-11-20 PROCEDURE — 3288F PR FALLS RISK ASSESSMENT DOCUMENTED: ICD-10-PCS | Mod: HCNC,CPTII,S$GLB, | Performed by: FAMILY MEDICINE

## 2020-11-20 PROCEDURE — 3078F DIAST BP <80 MM HG: CPT | Mod: HCNC,CPTII,S$GLB, | Performed by: FAMILY MEDICINE

## 2020-11-20 NOTE — PROGRESS NOTES
Subjective:       Patient ID: Yan Pickering Jr. is a 80 y.o. male.    Chief Complaint: Follow-up    HPI     80    Past Medical History:   Diagnosis Date    Atrial fibrillation     Cardiomyopathy 4/26/2017    CHF (congestive heart failure)     CKD (chronic kidney disease) stage 3, GFR 30-59 ml/min     Mild mitral insufficiency     Moderate aortic insufficiency     Venous stasis dermatitis of both lower extremities      History reviewed. No pertinent surgical history.  Social History     Tobacco Use   Smoking Status Never Smoker   Smokeless Tobacco Never Used     Family History   Problem Relation Age of Onset    Heart disease Father        Bleeding in cath  On/off  He is having pain at site cath    Wife reports also having nose bleeds    Pain in back is better  Credits mattress      Recently cbc done 1 mo ago  H/h 9.1/29.6    No dizzy  No sob  No confusion    Review of Systems   Constitutional: Negative for chills and fever.   HENT: Negative for trouble swallowing.    Eyes: Negative for visual disturbance.   Respiratory: Negative for shortness of breath.    Cardiovascular: Negative for chest pain.   Gastrointestinal: Negative for constipation and diarrhea.   Genitourinary: Negative for difficulty urinating.   Musculoskeletal: Negative for gait problem.   Skin: Negative for rash.   Neurological: Negative for dizziness and light-headedness.   Psychiatric/Behavioral: Negative for dysphoric mood.       Objective:       Vitals:    11/20/20 1034   Pulse: 81   Temp: 99 °F (37.2 °C)     Vitals:    11/20/20 1056   BP: 105/60   Pulse:    Temp:        Physical Exam  Constitutional:       General: He is not in acute distress.     Appearance: Normal appearance. He is well-developed.   HENT:      Head: Normocephalic and atraumatic.   Eyes:      General:         Right eye: No discharge.         Left eye: No discharge.      Conjunctiva/sclera: Conjunctivae normal.   Neck:      Musculoskeletal: Full passive range of motion  without pain.      Trachea: Trachea normal.   Cardiovascular:      Rate and Rhythm: Normal rate and regular rhythm.      Heart sounds: Normal heart sounds.   Pulmonary:      Effort: Pulmonary effort is normal. No respiratory distress.      Breath sounds: Normal breath sounds. No decreased breath sounds or wheezing.   Abdominal:      Palpations: Abdomen is soft.      Tenderness: There is no abdominal tenderness.   Genitourinary:     Comments: Pinkish/reddish urine in cath bag.  Musculoskeletal:         General: Deformity present.      Comments: Severe kyphosis   Lymphadenopathy:      Cervical: No cervical adenopathy.   Skin:     Coloration: Skin is not pale.   Neurological:      Mental Status: He is alert and oriented to person, place, and time.   Psychiatric:         Speech: Speech normal.         Behavior: Behavior normal.         Thought Content: Thought content normal.         Assessment:       1. Hematuria, unspecified type    2. Anemia, unspecified type    3. Atrial fibrillation, unspecified type    4. Anticoagulated    5. CKD (chronic kidney disease), stage IV        Plan:   Cath  - he is anti-coagulated  On Eliquis  Urine is slightly pink - and clearing  On / off    Will check cbc  Especially given he is anemia    Anemia  No SOB  No dizziness    Anticoagulated  Will continue as I suspect bleeding isn't life threatening and risk of clot is high    A. Fib  Rate controlled  BB  Anti-coagulated    Pain improved a bit  Feels the mattress has helped    ckd IV  cmp

## 2020-12-03 ENCOUNTER — OFFICE VISIT (OUTPATIENT)
Dept: UROLOGY | Facility: CLINIC | Age: 80
End: 2020-12-03
Payer: MEDICARE

## 2020-12-03 VITALS
HEART RATE: 102 BPM | WEIGHT: 122.13 LBS | SYSTOLIC BLOOD PRESSURE: 130 MMHG | BODY MASS INDEX: 19.63 KG/M2 | DIASTOLIC BLOOD PRESSURE: 80 MMHG | TEMPERATURE: 99 F | OXYGEN SATURATION: 97 % | HEIGHT: 66 IN

## 2020-12-03 DIAGNOSIS — R33.9 URINARY RETENTION: Primary | ICD-10-CM

## 2020-12-03 PROCEDURE — 1126F PR PAIN SEVERITY QUANTIFIED, NO PAIN PRESENT: ICD-10-PCS | Mod: HCNC,S$GLB,, | Performed by: UROLOGY

## 2020-12-03 PROCEDURE — 99999 PR PBB SHADOW E&M-EST. PATIENT-LVL III: CPT | Mod: PBBFAC,HCNC,, | Performed by: UROLOGY

## 2020-12-03 PROCEDURE — 52000 PR CYSTOURETHROSCOPY: ICD-10-PCS | Mod: HCNC,S$GLB,, | Performed by: UROLOGY

## 2020-12-03 PROCEDURE — 1101F PR PT FALLS ASSESS DOC 0-1 FALLS W/OUT INJ PAST YR: ICD-10-PCS | Mod: HCNC,CPTII,S$GLB, | Performed by: UROLOGY

## 2020-12-03 PROCEDURE — 3288F PR FALLS RISK ASSESSMENT DOCUMENTED: ICD-10-PCS | Mod: HCNC,CPTII,S$GLB, | Performed by: UROLOGY

## 2020-12-03 PROCEDURE — 99499 UNLISTED E&M SERVICE: CPT | Mod: HCNC,S$GLB,, | Performed by: UROLOGY

## 2020-12-03 PROCEDURE — 52000 CYSTOURETHROSCOPY: CPT | Mod: HCNC,S$GLB,, | Performed by: UROLOGY

## 2020-12-03 PROCEDURE — 99999 PR PBB SHADOW E&M-EST. PATIENT-LVL III: ICD-10-PCS | Mod: PBBFAC,HCNC,, | Performed by: UROLOGY

## 2020-12-03 PROCEDURE — 1101F PT FALLS ASSESS-DOCD LE1/YR: CPT | Mod: HCNC,CPTII,S$GLB, | Performed by: UROLOGY

## 2020-12-03 PROCEDURE — 3288F FALL RISK ASSESSMENT DOCD: CPT | Mod: HCNC,CPTII,S$GLB, | Performed by: UROLOGY

## 2020-12-03 PROCEDURE — 1126F AMNT PAIN NOTED NONE PRSNT: CPT | Mod: HCNC,S$GLB,, | Performed by: UROLOGY

## 2020-12-03 PROCEDURE — 99499 NO LOS: ICD-10-PCS | Mod: HCNC,S$GLB,, | Performed by: UROLOGY

## 2020-12-03 RX ORDER — LIDOCAINE HYDROCHLORIDE 20 MG/ML
JELLY TOPICAL
Status: COMPLETED | OUTPATIENT
Start: 2020-12-03 | End: 2020-12-03

## 2020-12-03 RX ADMIN — LIDOCAINE HYDROCHLORIDE: 20 JELLY TOPICAL at 10:12

## 2020-12-03 NOTE — PROCEDURES
Procedures     Procedure:  Diagnostic Cystoscopy    Indications: urinary retention    UA: normal, see lab results for values    Procedure in Detail: After proper consents were obtained, the patient was prepped and draped in normal sterile fashion for diagnostic cystoscopy. 5 ml of lidocaine jelly was instilled in the urethra. The flexible cystoscope was then introduced into the urethra, and advanced into the bladder under direct vision. The urethral mucosa appeared normal, and no strictures were noted. The sphincter was normal, and the veru montanum was unremarkable.Trilobar hypertrophy and obstruction of the prostate was noted. The bladder neck was normal. Inspection of the interior of the bladder was then carried out. Systematic inspection of the mucosa of the bladder it was then carried out, rotating the cystoscope so that all areas of the left and right lateral walls, the dome of the bladder, and the posterior wall were all visualized. The cystoscope was then advanced further into the bladder, and maximum deflection of the scope was performed so that the bladder neck could be inspected. The bladder overall was very unhealthy appearing with numerous cellules and severe trabeculations present. 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  The ureteral orifices were not seen due to significant protrusion of the prostate in to the bladder. The cystoscope was then removed, and the procedure terminated. Patient tolerated the procedure well. No complications.     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

## 2020-12-07 ENCOUNTER — TELEPHONE (OUTPATIENT)
Dept: INTERNAL MEDICINE | Facility: CLINIC | Age: 80
End: 2020-12-07

## 2020-12-07 NOTE — TELEPHONE ENCOUNTER
----- Message from Yuli Eldridge sent at 12/7/2020  3:11 PM CST -----  Regarding: PATIENT  Contact: JAQUAN CAREGIVER  Type:  Needs Medical Advice    Who Called: JAQUAN  Symptoms (please be specific): CHILLS AND FEVER   How long has patient had these symptoms:  LAST EVENING  Pharmacy name and phone #:    [facility] [place], [place] - [address]  [address] [facility] [address]  Phone: [phone] Fax: [phone]      Would the patient rather a call back or a response via [facility]? CALL  Best Call Back Number: [phone]  Additional Information: URGENT!!!! PLEASE CALL

## 2020-12-07 NOTE — TELEPHONE ENCOUNTER
Patient has been having fever and chills.  Had this cath taken out.  Have been using self cath.  Been having fever and chills since Sunday.  Advised the patient to seek medical attention. /delaney/

## 2020-12-08 ENCOUNTER — HOSPITAL ENCOUNTER (INPATIENT)
Facility: HOSPITAL | Age: 80
LOS: 2 days | Discharge: HOME | DRG: 194 | End: 2020-12-10
Attending: FAMILY MEDICINE | Admitting: FAMILY MEDICINE
Payer: MEDICARE

## 2020-12-08 DIAGNOSIS — N30.00 ACUTE CYSTITIS WITHOUT HEMATURIA: Primary | ICD-10-CM

## 2020-12-08 DIAGNOSIS — R39.14 BENIGN PROSTATIC HYPERPLASIA WITH INCOMPLETE BLADDER EMPTYING: ICD-10-CM

## 2020-12-08 DIAGNOSIS — I95.9 HYPOTENSION, UNSPECIFIED HYPOTENSION TYPE: ICD-10-CM

## 2020-12-08 DIAGNOSIS — J18.9 PNEUMONIA OF LEFT LOWER LOBE DUE TO INFECTIOUS ORGANISM: ICD-10-CM

## 2020-12-08 DIAGNOSIS — R07.9 CHEST PAIN: ICD-10-CM

## 2020-12-08 DIAGNOSIS — N40.1 BENIGN PROSTATIC HYPERPLASIA WITH INCOMPLETE BLADDER EMPTYING: ICD-10-CM

## 2020-12-08 DIAGNOSIS — I48.20 CHRONIC ATRIAL FIBRILLATION: ICD-10-CM

## 2020-12-08 PROBLEM — D63.8 ANEMIA OF CHRONIC DISEASE: Status: ACTIVE | Noted: 2020-01-23

## 2020-12-08 PROBLEM — I25.10 CORONARY ARTERY DISEASE INVOLVING NATIVE CORONARY ARTERY: Status: ACTIVE | Noted: 2020-12-08

## 2020-12-08 LAB
ALBUMIN SERPL BCP-MCNC: 2.8 G/DL (ref 3.5–5.2)
ALP SERPL-CCNC: 106 U/L (ref 55–135)
ALT SERPL W/O P-5'-P-CCNC: 12 U/L (ref 10–44)
ANION GAP SERPL CALC-SCNC: 12 MMOL/L (ref 8–16)
AST SERPL-CCNC: 12 U/L (ref 10–40)
BACTERIA #/AREA URNS HPF: ABNORMAL /HPF
BASOPHILS # BLD AUTO: 0.02 K/UL (ref 0–0.2)
BASOPHILS NFR BLD: 0.2 % (ref 0–1.9)
BILIRUB SERPL-MCNC: 0.9 MG/DL (ref 0.1–1)
BILIRUB UR QL STRIP: NEGATIVE
BILIRUB UR QL STRIP: NEGATIVE
BUN SERPL-MCNC: 36 MG/DL (ref 8–23)
CALCIUM SERPL-MCNC: 8.4 MG/DL (ref 8.7–10.5)
CHLORIDE SERPL-SCNC: 103 MMOL/L (ref 95–110)
CLARITY UR: ABNORMAL
CLARITY UR: ABNORMAL
CO2 SERPL-SCNC: 22 MMOL/L (ref 23–29)
COLOR UR: YELLOW
COLOR UR: YELLOW
CREAT SERPL-MCNC: 2.3 MG/DL (ref 0.5–1.4)
DIFFERENTIAL METHOD: ABNORMAL
EOSINOPHIL # BLD AUTO: 0 K/UL (ref 0–0.5)
EOSINOPHIL NFR BLD: 0.1 % (ref 0–8)
ERYTHROCYTE [DISTWIDTH] IN BLOOD BY AUTOMATED COUNT: 16.3 % (ref 11.5–14.5)
EST. GFR  (AFRICAN AMERICAN): 29 ML/MIN/1.73 M^2
EST. GFR  (NON AFRICAN AMERICAN): 25 ML/MIN/1.73 M^2
GLUCOSE SERPL-MCNC: 175 MG/DL (ref 70–110)
GLUCOSE UR QL STRIP: NEGATIVE
GLUCOSE UR QL STRIP: NEGATIVE
HCT VFR BLD AUTO: 30.6 % (ref 40–54)
HGB BLD-MCNC: 9.6 G/DL (ref 14–18)
HGB UR QL STRIP: ABNORMAL
HGB UR QL STRIP: ABNORMAL
HYALINE CASTS #/AREA URNS LPF: 0 /LPF
IMM GRANULOCYTES # BLD AUTO: 0.07 K/UL (ref 0–0.04)
IMM GRANULOCYTES NFR BLD AUTO: 0.7 % (ref 0–0.5)
KETONES UR QL STRIP: NEGATIVE
KETONES UR QL STRIP: NEGATIVE
LACTATE SERPL-SCNC: 1.8 MMOL/L (ref 0.5–2.2)
LACTATE SERPL-SCNC: 2.9 MMOL/L (ref 0.5–2.2)
LEUKOCYTE ESTERASE UR QL STRIP: ABNORMAL
LEUKOCYTE ESTERASE UR QL STRIP: ABNORMAL
LYMPHOCYTES # BLD AUTO: 0.5 K/UL (ref 1–4.8)
LYMPHOCYTES NFR BLD: 5.4 % (ref 18–48)
MCH RBC QN AUTO: 30.5 PG (ref 27–31)
MCHC RBC AUTO-ENTMCNC: 31.4 G/DL (ref 32–36)
MCV RBC AUTO: 97 FL (ref 82–98)
MICROSCOPIC COMMENT: ABNORMAL
MONOCYTES # BLD AUTO: 1 K/UL (ref 0.3–1)
MONOCYTES NFR BLD: 10.2 % (ref 4–15)
NEUTROPHILS # BLD AUTO: 7.9 K/UL (ref 1.8–7.7)
NEUTROPHILS NFR BLD: 83.4 % (ref 38–73)
NITRITE UR QL STRIP: POSITIVE
NITRITE UR QL STRIP: POSITIVE
NRBC BLD-RTO: 0 /100 WBC
PH UR STRIP: 7 [PH] (ref 5–8)
PH UR STRIP: 7 [PH] (ref 5–8)
PLATELET # BLD AUTO: 177 K/UL (ref 150–350)
PMV BLD AUTO: 10 FL (ref 9.2–12.9)
POTASSIUM SERPL-SCNC: 4.9 MMOL/L (ref 3.5–5.1)
PROCALCITONIN SERPL IA-MCNC: 0.14 NG/ML
PROT SERPL-MCNC: 6.7 G/DL (ref 6–8.4)
PROT UR QL STRIP: ABNORMAL
PROT UR QL STRIP: ABNORMAL
RBC # BLD AUTO: 3.15 M/UL (ref 4.6–6.2)
RBC #/AREA URNS HPF: 10 /HPF (ref 0–4)
SARS-COV-2 RDRP RESP QL NAA+PROBE: NEGATIVE
SODIUM SERPL-SCNC: 137 MMOL/L (ref 136–145)
SP GR UR STRIP: 1.02 (ref 1–1.03)
SP GR UR STRIP: 1.02 (ref 1–1.03)
URN SPEC COLLECT METH UR: ABNORMAL
URN SPEC COLLECT METH UR: ABNORMAL
UROBILINOGEN UR STRIP-ACNC: NEGATIVE EU/DL
UROBILINOGEN UR STRIP-ACNC: NEGATIVE EU/DL
WBC # BLD AUTO: 9.5 K/UL (ref 3.9–12.7)
WBC #/AREA URNS HPF: >100 /HPF (ref 0–5)

## 2020-12-08 PROCEDURE — 83605 ASSAY OF LACTIC ACID: CPT | Mod: HCNC

## 2020-12-08 PROCEDURE — 87040 BLOOD CULTURE FOR BACTERIA: CPT | Mod: HCNC

## 2020-12-08 PROCEDURE — 36415 COLL VENOUS BLD VENIPUNCTURE: CPT | Mod: HCNC

## 2020-12-08 PROCEDURE — 21400001 HC TELEMETRY ROOM: Mod: HCNC

## 2020-12-08 PROCEDURE — G0378 HOSPITAL OBSERVATION PER HR: HCPCS | Mod: HCNC

## 2020-12-08 PROCEDURE — 25000003 PHARM REV CODE 250: Mod: HCNC | Performed by: FAMILY MEDICINE

## 2020-12-08 PROCEDURE — 25000003 PHARM REV CODE 250: Mod: HCNC | Performed by: NURSE PRACTITIONER

## 2020-12-08 PROCEDURE — 87086 URINE CULTURE/COLONY COUNT: CPT | Mod: 59,HCNC

## 2020-12-08 PROCEDURE — 87088 URINE BACTERIA CULTURE: CPT | Mod: 59,HCNC

## 2020-12-08 PROCEDURE — 87077 CULTURE AEROBIC IDENTIFY: CPT | Mod: 59,HCNC

## 2020-12-08 PROCEDURE — 87186 SC STD MICRODIL/AGAR DIL: CPT | Mod: 59,HCNC

## 2020-12-08 PROCEDURE — 84145 PROCALCITONIN (PCT): CPT | Mod: HCNC

## 2020-12-08 PROCEDURE — 63600175 PHARM REV CODE 636 W HCPCS: Mod: HCNC | Performed by: FAMILY MEDICINE

## 2020-12-08 PROCEDURE — 81000 URINALYSIS NONAUTO W/SCOPE: CPT | Mod: HCNC

## 2020-12-08 PROCEDURE — 96365 THER/PROPH/DIAG IV INF INIT: CPT

## 2020-12-08 PROCEDURE — 87086 URINE CULTURE/COLONY COUNT: CPT | Mod: HCNC

## 2020-12-08 PROCEDURE — 99291 CRITICAL CARE FIRST HOUR: CPT | Mod: 25,HCNC

## 2020-12-08 PROCEDURE — U0002 COVID-19 LAB TEST NON-CDC: HCPCS | Mod: HCNC

## 2020-12-08 PROCEDURE — P9612 CATHETERIZE FOR URINE SPEC: HCPCS | Mod: HCNC

## 2020-12-08 PROCEDURE — 80053 COMPREHEN METABOLIC PANEL: CPT | Mod: HCNC

## 2020-12-08 PROCEDURE — 85025 COMPLETE CBC W/AUTO DIFF WBC: CPT | Mod: HCNC

## 2020-12-08 RX ORDER — METOPROLOL TARTRATE 50 MG/1
100 TABLET ORAL 2 TIMES DAILY
Status: DISCONTINUED | OUTPATIENT
Start: 2020-12-08 | End: 2020-12-10 | Stop reason: HOSPADM

## 2020-12-08 RX ORDER — METOPROLOL TARTRATE 50 MG/1
100 TABLET ORAL 2 TIMES DAILY
Status: DISCONTINUED | OUTPATIENT
Start: 2020-12-08 | End: 2020-12-08

## 2020-12-08 RX ORDER — LOSARTAN POTASSIUM 100 MG/1
100 TABLET ORAL DAILY
Status: ON HOLD | COMMUNITY
End: 2020-12-10 | Stop reason: HOSPADM

## 2020-12-08 RX ADMIN — SODIUM CHLORIDE 1000 ML: 0.9 INJECTION, SOLUTION INTRAVENOUS at 07:12

## 2020-12-08 RX ADMIN — APIXABAN 2.5 MG: 2.5 TABLET, FILM COATED ORAL at 10:12

## 2020-12-08 RX ADMIN — METOPROLOL TARTRATE 100 MG: 50 TABLET, FILM COATED ORAL at 10:12

## 2020-12-08 RX ADMIN — PIPERACILLIN AND TAZOBACTAM 4.5 G: 4; .5 INJECTION, POWDER, LYOPHILIZED, FOR SOLUTION INTRAVENOUS; PARENTERAL at 07:12

## 2020-12-08 NOTE — ED PROVIDER NOTES
12/8/2020, 12:07 PM   History obtained from the patient      History of Present Illness: Yan Pickering Jr. is a 80 y.o. male patient who presents to the Emergency Department for fevers and chills which onset 4 days ago.    12:08 PM: Pt understands they will be seen and dispositioned by the next available physician. Pt voices understanding and agrees with plan. Pt also understands the longer than normal wait time. I am removing myself from the care of pt and pt will now be assigned to the next available physician.     SERENA Gomez FN-C      SCRIBE #1 NOTE: I, Ryan Smith, am scribing for, and in the presence of, Meggan Meyer MD. I have scribed the entire note.       History     Chief Complaint   Patient presents with    Fever     and chills started monday, pt self caths and dr. raman office sent over for UA.      Review of patient's allergies indicates:   Allergen Reactions    Doxycycline Other (See Comments)     abd pain, bloating         History of Present Illness     HPI    12/8/2020, 3:17 PM  History obtained from the patient      History of Present Illness: Yan Pickering Jr. is a 80 y.o. male patient with a PMHx of A-fib, CHF, cardiomyopathy, CKD, who presents to the Emergency Department as a referral by Dr. Issa for evaluation of fever/chills which onset yesterday; concerned about sepsis. Symptoms are constant and moderate in severity. No mitigating or exacerbating factors reported. Pt has an indwelling Bustamante catheter. His urologist provided pt the opportunity to self cath, which he has been doing for the past week. Reports some blood in the urine, but pt says this is normal. No associated sxs reported. Patient denies any CP, SOB, n/v, and all other sxs at this time. No prior Tx reported. No further complaints or concerns at this time.       Arrival mode: Personal vehicle    PCP: Brenden Issa MD        Past Medical History:  Past Medical History:   Diagnosis Date    Atrial fibrillation      Cardiomyopathy 4/26/2017    CHF (congestive heart failure)     CKD (chronic kidney disease) stage 3, GFR 30-59 ml/min     Mild mitral insufficiency     Moderate aortic insufficiency     Venous stasis dermatitis of both lower extremities        Past Surgical History:  History reviewed. No pertinent surgical history.      Family History:  Family History   Problem Relation Age of Onset    No Known Problems Mother        Social History:  Social History     Tobacco Use    Smoking status: Never Smoker    Smokeless tobacco: Never Used   Substance and Sexual Activity    Alcohol use: No    Drug use: No    Sexual activity: Not Currently        Review of Systems     Review of Systems   Constitutional: Positive for chills and fever. Negative for activity change and diaphoresis.   HENT: Negative for congestion, drooling, ear pain, rhinorrhea, sneezing, sore throat and trouble swallowing.    Eyes: Negative for pain.   Respiratory: Negative for cough, chest tightness, shortness of breath, wheezing and stridor.    Cardiovascular: Negative for chest pain, palpitations and leg swelling.   Gastrointestinal: Negative for abdominal distention, abdominal pain, constipation, diarrhea, nausea and vomiting.   Genitourinary: Positive for hematuria. Negative for difficulty urinating, dysuria, frequency and urgency.   Musculoskeletal: Negative for arthralgias, back pain, myalgias, neck pain and neck stiffness.   Skin: Negative for pallor, rash and wound.   Neurological: Negative for dizziness, syncope, weakness, light-headedness, numbness and headaches.   All other systems reviewed and are negative.       Physical Exam     Initial Vitals [12/08/20 1200]   BP Pulse Resp Temp SpO2   (!) 101/58 (!) 113 20 98.3 °F (36.8 °C) 95 %      MAP       --          Physical Exam  Nursing Notes and Vital Signs Reviewed.  Constitutional: Patient is in no acute distress.  Head: Atraumatic. Normocephalic.  Eyes: PERRL. EOM intact. Conjunctivae  are not pale. No scleral icterus.  ENT: Mucous membranes are moist. Oropharynx is clear and symmetric.    Neck: Supple. Full ROM. No lymphadenopathy.  Cardiovascular: Regular rate. Regular rhythm. No murmurs, rubs, or gallops. Distal pulses are 2+ and symmetric.  Pulmonary/Chest: No respiratory distress. Clear to auscultation bilaterally. No wheezing or rales.  Abdominal: Soft and non-distended.  There is suprapubic tenderness.  No rebound, guarding, or rigidity. Good bowel sounds.  Genitourinary: No CVA tenderness  Musculoskeletal: Moves all extremities. No obvious deformities. No edema. No calf tenderness.  Skin: Warm and dry.  Neurological:  Alert, awake, and appropriate.  Normal speech.  No acute focal neurological deficits are appreciated.  Psychiatric: Normal affect. Good eye contact. Appropriate in content.     ED Course   Critical Care    Date/Time: 12/8/2020 7:19 PM  Performed by: Meggan Meyer MD  Authorized by: Meggan Meyer MD   Direct patient critical care time: 15 minutes  Additional history critical care time: 11 minutes  Ordering / reviewing critical care time: 7 minutes  Documentation critical care time: 8 minutes  Total critical care time (exclusive of procedural time) : 41 minutes  Critical care time was exclusive of separately billable procedures and treating other patients and teaching time.  Critical care was necessary to treat or prevent imminent or life-threatening deterioration of the following conditions: PNA.  Critical care was time spent personally by me on the following activities: blood draw for specimens, development of treatment plan with patient or surrogate, interpretation of cardiac output measurements, evaluation of patient's response to treatment, examination of patient, obtaining history from patient or surrogate, ordering and performing treatments and interventions, ordering and review of laboratory studies, ordering and review of radiographic studies, pulse oximetry,  re-evaluation of patient's condition and review of old charts.        ED Vital Signs:  Vitals:    12/08/20 2140 12/08/20 2301 12/08/20 2303 12/08/20 2348   BP:  (!) 104/56  (!) 113/54   Pulse: (!) 119  109 (!) 120   Resp:   19 19   Temp: 98.4 °F (36.9 °C)   98 °F (36.7 °C)   TempSrc: Oral   Oral   SpO2:   98% 97%   Weight:       Height:        12/09/20 0315 12/09/20 0722 12/09/20 0812 12/09/20 0817   BP: 118/69  98/62    Pulse: (!) 122 (!) 120 (!) 165 (!) 144   Resp: 20  16    Temp: 98.4 °F (36.9 °C)  98 °F (36.7 °C)    TempSrc: Oral      SpO2: (!) 94%  95%    Weight: 57.7 kg (127 lb 3.3 oz)      Height:        12/09/20 0854 12/09/20 0959 12/09/20 1119 12/09/20 1120   BP:    (!) 96/57   Pulse: (!) 125 (!) 112 106 110   Resp:    16   Temp:    97.9 °F (36.6 °C)   TempSrc:       SpO2:    95%   Weight:       Height:        12/09/20 1315 12/09/20 1513 12/09/20 1526   BP:  (!) 100/54    Pulse: 104 (!) 112 94   Resp:  18    Temp:  97.9 °F (36.6 °C)    TempSrc:      SpO2:  97%    Weight:      Height:          Abnormal Lab Results:  Labs Reviewed   CBC W/ AUTO DIFFERENTIAL - Abnormal; Notable for the following components:       Result Value    RBC 3.15 (*)     Hemoglobin 9.6 (*)     Hematocrit 30.6 (*)     MCHC 31.4 (*)     RDW 16.3 (*)     Immature Granulocytes 0.7 (*)     Gran # (ANC) 7.9 (*)     Immature Grans (Abs) 0.07 (*)     Lymph # 0.5 (*)     Gran % 83.4 (*)     Lymph % 5.4 (*)     All other components within normal limits   COMPREHENSIVE METABOLIC PANEL - Abnormal; Notable for the following components:    CO2 22 (*)     Glucose 175 (*)     BUN 36 (*)     Creatinine 2.3 (*)     Calcium 8.4 (*)     Albumin 2.8 (*)     eGFR if  29 (*)     eGFR if non  25 (*)     All other components within normal limits   LACTIC ACID, PLASMA - Abnormal; Notable for the following components:    Lactate (Lactic Acid) 2.9 (*)     All other components within normal limits   URINALYSIS, REFLEX TO URINE  CULTURE - Abnormal; Notable for the following components:    Appearance, UA Cloudy (*)     Protein, UA 2+ (*)     Occult Blood UA 2+ (*)     Nitrite, UA Positive (*)     Leukocytes, UA 3+ (*)     All other components within normal limits    Narrative:     Specimen Source->Urine   URINALYSIS, REFLEX TO URINE CULTURE - Abnormal; Notable for the following components:    Appearance, UA Cloudy (*)     Protein, UA 2+ (*)     Occult Blood UA 2+ (*)     Nitrite, UA Positive (*)     Leukocytes, UA 3+ (*)     All other components within normal limits    Narrative:     Specimen Source->Urine   URINALYSIS MICROSCOPIC - Abnormal; Notable for the following components:    RBC, UA 10 (*)     WBC, UA >100 (*)     All other components within normal limits    Narrative:     Specimen Source->Urine   CULTURE, BLOOD   CULTURE, URINE   PROCALCITONIN   SARS-COV-2 RNA AMPLIFICATION, QUAL   LACTIC ACID, PLASMA        All Lab Results:  Results for orders placed or performed during the hospital encounter of 12/08/20   CBC auto differential   Result Value Ref Range    WBC 9.50 3.90 - 12.70 K/uL    RBC 3.15 (L) 4.60 - 6.20 M/uL    Hemoglobin 9.6 (L) 14.0 - 18.0 g/dL    Hematocrit 30.6 (L) 40.0 - 54.0 %    MCV 97 82 - 98 fL    MCH 30.5 27.0 - 31.0 pg    MCHC 31.4 (L) 32.0 - 36.0 g/dL    RDW 16.3 (H) 11.5 - 14.5 %    Platelets 177 150 - 350 K/uL    MPV 10.0 9.2 - 12.9 fL    Immature Granulocytes 0.7 (H) 0.0 - 0.5 %    Gran # (ANC) 7.9 (H) 1.8 - 7.7 K/uL    Immature Grans (Abs) 0.07 (H) 0.00 - 0.04 K/uL    Lymph # 0.5 (L) 1.0 - 4.8 K/uL    Mono # 1.0 0.3 - 1.0 K/uL    Eos # 0.0 0.0 - 0.5 K/uL    Baso # 0.02 0.00 - 0.20 K/uL    nRBC 0 0 /100 WBC    Gran % 83.4 (H) 38.0 - 73.0 %    Lymph % 5.4 (L) 18.0 - 48.0 %    Mono % 10.2 4.0 - 15.0 %    Eosinophil % 0.1 0.0 - 8.0 %    Basophil % 0.2 0.0 - 1.9 %    Differential Method Automated    Comprehensive metabolic panel   Result Value Ref Range    Sodium 137 136 - 145 mmol/L    Potassium 4.9 3.5 - 5.1  mmol/L    Chloride 103 95 - 110 mmol/L    CO2 22 (L) 23 - 29 mmol/L    Glucose 175 (H) 70 - 110 mg/dL    BUN 36 (H) 8 - 23 mg/dL    Creatinine 2.3 (H) 0.5 - 1.4 mg/dL    Calcium 8.4 (L) 8.7 - 10.5 mg/dL    Total Protein 6.7 6.0 - 8.4 g/dL    Albumin 2.8 (L) 3.5 - 5.2 g/dL    Total Bilirubin 0.9 0.1 - 1.0 mg/dL    Alkaline Phosphatase 106 55 - 135 U/L    AST 12 10 - 40 U/L    ALT 12 10 - 44 U/L    Anion Gap 12 8 - 16 mmol/L    eGFR if African American 29 (A) >60 mL/min/1.73 m^2    eGFR if non African American 25 (A) >60 mL/min/1.73 m^2   Lactic acid, plasma #1   Result Value Ref Range    Lactate (Lactic Acid) 2.9 (H) 0.5 - 2.2 mmol/L   Urinalysis, Reflex to Urine Culture Urine, Clean Catch    Specimen: Urine   Result Value Ref Range    Specimen UA Urine, Clean Catch     Color, UA Yellow Yellow, Straw, Vero    Appearance, UA Cloudy (A) Clear    pH, UA 7.0 5.0 - 8.0    Specific Gravity, UA 1.020 1.005 - 1.030    Protein, UA 2+ (A) Negative    Glucose, UA Negative Negative    Ketones, UA Negative Negative    Bilirubin (UA) Negative Negative    Occult Blood UA 2+ (A) Negative    Nitrite, UA Positive (A) Negative    Urobilinogen, UA Negative <2.0 EU/dL    Leukocytes, UA 3+ (A) Negative   Procalcitonin   Result Value Ref Range    Procalcitonin 0.14 <0.25 ng/mL   Urinalysis, Reflex to Urine Culture Urine, Clean Catch    Specimen: Urine   Result Value Ref Range    Specimen UA Urine, Clean Catch     Color, UA Yellow Yellow, Straw, Vero    Appearance, UA Cloudy (A) Clear    pH, UA 7.0 5.0 - 8.0    Specific Gravity, UA 1.020 1.005 - 1.030    Protein, UA 2+ (A) Negative    Glucose, UA Negative Negative    Ketones, UA Negative Negative    Bilirubin (UA) Negative Negative    Occult Blood UA 2+ (A) Negative    Nitrite, UA Positive (A) Negative    Urobilinogen, UA Negative <2.0 EU/dL    Leukocytes, UA 3+ (A) Negative   COVID-19 Rapid Screening   Result Value Ref Range    SARS-CoV-2 RNA, Amplification, Qual Negative Negative    Lactic acid, plasma #2   Result Value Ref Range    Lactate (Lactic Acid) 1.8 0.5 - 2.2 mmol/L   Urinalysis Microscopic   Result Value Ref Range    RBC, UA 10 (H) 0 - 4 /hpf    WBC, UA >100 (H) 0 - 5 /hpf    Bacteria Rare None-Occ /hpf    Hyaline Casts, UA 0 0-1/lpf /lpf    Microscopic Comment SEE COMMENT    Basic Metabolic Panel (BMP)   Result Value Ref Range    Sodium 137 136 - 145 mmol/L    Potassium 4.2 3.5 - 5.1 mmol/L    Chloride 108 95 - 110 mmol/L    CO2 22 (L) 23 - 29 mmol/L    Glucose 85 70 - 110 mg/dL    BUN 35 (H) 8 - 23 mg/dL    Creatinine 1.8 (H) 0.5 - 1.4 mg/dL    Calcium 8.0 (L) 8.7 - 10.5 mg/dL    Anion Gap 7 (L) 8 - 16 mmol/L    eGFR if African American 40 (A) >60 mL/min/1.73 m^2    eGFR if non African American 34 (A) >60 mL/min/1.73 m^2   CBC with Automated Differential   Result Value Ref Range    WBC 10.93 3.90 - 12.70 K/uL    RBC 3.09 (L) 4.60 - 6.20 M/uL    Hemoglobin 9.2 (L) 14.0 - 18.0 g/dL    Hematocrit 31.0 (L) 40.0 - 54.0 %     (H) 82 - 98 fL    MCH 29.8 27.0 - 31.0 pg    MCHC 29.7 (L) 32.0 - 36.0 g/dL    RDW 16.4 (H) 11.5 - 14.5 %    Platelets 154 150 - 350 K/uL    MPV 10.3 9.2 - 12.9 fL    Immature Granulocytes 1.0 (H) 0.0 - 0.5 %    Gran # (ANC) 8.3 (H) 1.8 - 7.7 K/uL    Immature Grans (Abs) 0.11 (H) 0.00 - 0.04 K/uL    Lymph # 0.7 (L) 1.0 - 4.8 K/uL    Mono # 1.7 (H) 0.3 - 1.0 K/uL    Eos # 0.0 0.0 - 0.5 K/uL    Baso # 0.04 0.00 - 0.20 K/uL    nRBC 0 0 /100 WBC    Gran % 76.2 (H) 38.0 - 73.0 %    Lymph % 6.1 (L) 18.0 - 48.0 %    Mono % 15.9 (H) 4.0 - 15.0 %    Eosinophil % 0.4 0.0 - 8.0 %    Basophil % 0.4 0.0 - 1.9 %    Differential Method Automated          Imaging Results:  Imaging Results          X-Ray Chest AP Portable (Final result)  Result time 12/08/20 14:20:50    Final result by Samson Cope III, MD (12/08/20 14:20:50)                 Impression:      Interval development of left basilar infiltrate and atelectatic change with small bilateral effusions  suggested.  Chronic lung changes.  Follow-up recommended      Electronically signed by: Samson Cope MD  Date:    12/08/2020  Time:    14:20             Narrative:    EXAMINATION:  XR CHEST AP PORTABLE    CLINICAL HISTORY:  Sepsis;    COMPARISON:  October    FINDINGS:  The heart size is borderline to mildly enlarged.  The patient's head and neck obscure the lung apices.  Interval development of blunting of both costophrenic angles with a band of infiltrate and/or atelectatic change in the retrocardiac region at the left base.  Right upper lung field infiltrate has cleared.  Chronic lung changes.                                          The Emergency Provider reviewed the vital signs and test results, which are outlined above.     ED Discussion     7:18 PM: Discussed case with Dr. Romero ([facility]). Dr. Romero agrees with current care and management of pt and accepts admission.   Admitting Service: [facility]  Admitting Physician: Dr. Romero  Admit to: Obs tele    7:22 PM: Re-evaluated pt. I have discussed test results, shared treatment plan, and the need for admission with patient and family at bedside. Pt and family express understanding at this time and agree with all information. All questions answered. Pt and family have no further questions or concerns at this time. Pt is ready for admit.             Medical Decision Making:   Clinical Tests:   Lab Tests: Ordered and Reviewed  Radiological Study: Ordered and Reviewed           ED Medication(s):  Medications   apixaban tablet 2.5 mg (2.5 mg Oral Given 12/9/20 0803)   sodium chloride 0.9% flush 10 mL (has no administration in time range)   magnesium oxide tablet 800 mg (has no administration in time range)   magnesium oxide tablet 800 mg (has no administration in time range)   potassium, sodium phosphates 280-160-250 mg packet 2 packet (has no administration in time range)   potassium, sodium phosphates 280-160-250 mg packet 2 packet (has no  administration in time range)   potassium, sodium phosphates 280-160-250 mg packet 2 packet (has no administration in time range)   ondansetron injection 8 mg (has no administration in time range)   glucose chewable tablet 16 g (has no administration in time range)   glucose chewable tablet 24 g (has no administration in time range)   dextrose 50% injection 12.5 g (has no administration in time range)   dextrose 50% injection 25 g (has no administration in time range)   glucagon (human recombinant) injection 1 mg (has no administration in time range)   melatonin tablet 6 mg (has no administration in time range)   metoprolol tartrate (LOPRESSOR) tablet 100 mg (100 mg Oral Given 12/9/20 0803)   piperacillin-tazobactam 4.5 g in dextrose 5 % 100 mL IVPB (ready to mix system) (4.5 g Intravenous New Bag 12/9/20 1527)   sodium chloride 0.9% bolus 1,000 mL (0 mLs Intravenous Stopped 12/8/20 1947)   piperacillin-tazobactam 4.5 g in dextrose 5 % 100 mL IVPB (ready to mix system) (0 g Intravenous Stopped 12/8/20 2028)   digoxin injection 250 mcg (250 mcg Intravenous Given 12/9/20 1311)       Current Discharge Medication List                  Scribe Attestation:   Scribe #1: I performed the above scribed service and the documentation accurately describes the services I performed. I attest to the accuracy of the note.     Attending:   Physician Attestation Statement for Scribe #1: I, Meggan Meyer MD, personally performed the services described in this documentation, as scribed by Ryan Smith, in my presence, and it is both accurate and complete.           Clinical Impression       ICD-10-CM ICD-9-CM   1. Pneumonia of left lower lobe due to infectious organism  J18.9 486   2. Acute cystitis without hematuria  N30.00 595.0   3. Hypotension, unspecified hypotension type  I95.9 458.9   4. Chest pain  R07.9 786.50   5. Benign prostatic hyperplasia with incomplete bladder emptying  N40.1 600.01    R39.14 788.21                          Meggan Meyer MD  12/09/20 1828

## 2020-12-09 LAB
ANION GAP SERPL CALC-SCNC: 7 MMOL/L (ref 8–16)
BASOPHILS # BLD AUTO: 0.04 K/UL (ref 0–0.2)
BASOPHILS NFR BLD: 0.4 % (ref 0–1.9)
BUN SERPL-MCNC: 35 MG/DL (ref 8–23)
CALCIUM SERPL-MCNC: 8 MG/DL (ref 8.7–10.5)
CHLORIDE SERPL-SCNC: 108 MMOL/L (ref 95–110)
CO2 SERPL-SCNC: 22 MMOL/L (ref 23–29)
CREAT SERPL-MCNC: 1.8 MG/DL (ref 0.5–1.4)
DIFFERENTIAL METHOD: ABNORMAL
EOSINOPHIL # BLD AUTO: 0 K/UL (ref 0–0.5)
EOSINOPHIL NFR BLD: 0.4 % (ref 0–8)
ERYTHROCYTE [DISTWIDTH] IN BLOOD BY AUTOMATED COUNT: 16.4 % (ref 11.5–14.5)
EST. GFR  (AFRICAN AMERICAN): 40 ML/MIN/1.73 M^2
EST. GFR  (NON AFRICAN AMERICAN): 34 ML/MIN/1.73 M^2
GLUCOSE SERPL-MCNC: 85 MG/DL (ref 70–110)
HCT VFR BLD AUTO: 31 % (ref 40–54)
HGB BLD-MCNC: 9.2 G/DL (ref 14–18)
IMM GRANULOCYTES # BLD AUTO: 0.11 K/UL (ref 0–0.04)
IMM GRANULOCYTES NFR BLD AUTO: 1 % (ref 0–0.5)
IRON SERPL-MCNC: 16 UG/DL (ref 45–160)
LYMPHOCYTES # BLD AUTO: 0.7 K/UL (ref 1–4.8)
LYMPHOCYTES NFR BLD: 6.1 % (ref 18–48)
MCH RBC QN AUTO: 29.8 PG (ref 27–31)
MCHC RBC AUTO-ENTMCNC: 29.7 G/DL (ref 32–36)
MCV RBC AUTO: 100 FL (ref 82–98)
MONOCYTES # BLD AUTO: 1.7 K/UL (ref 0.3–1)
MONOCYTES NFR BLD: 15.9 % (ref 4–15)
NEUTROPHILS # BLD AUTO: 8.3 K/UL (ref 1.8–7.7)
NEUTROPHILS NFR BLD: 76.2 % (ref 38–73)
NRBC BLD-RTO: 0 /100 WBC
PLATELET # BLD AUTO: 154 K/UL (ref 150–350)
PMV BLD AUTO: 10.3 FL (ref 9.2–12.9)
POTASSIUM SERPL-SCNC: 4.2 MMOL/L (ref 3.5–5.1)
RBC # BLD AUTO: 3.09 M/UL (ref 4.6–6.2)
SATURATED IRON: 5 % (ref 20–50)
SODIUM SERPL-SCNC: 137 MMOL/L (ref 136–145)
TOTAL IRON BINDING CAPACITY: 324 UG/DL (ref 250–450)
TRANSFERRIN SERPL-MCNC: 219 MG/DL (ref 200–375)
WBC # BLD AUTO: 10.93 K/UL (ref 3.9–12.7)

## 2020-12-09 PROCEDURE — 25000003 PHARM REV CODE 250: Mod: HCNC | Performed by: EMERGENCY MEDICINE

## 2020-12-09 PROCEDURE — 63600175 PHARM REV CODE 636 W HCPCS: Mod: HCNC | Performed by: NURSE PRACTITIONER

## 2020-12-09 PROCEDURE — 21400001 HC TELEMETRY ROOM: Mod: HCNC

## 2020-12-09 PROCEDURE — 96375 TX/PRO/DX INJ NEW DRUG ADDON: CPT

## 2020-12-09 PROCEDURE — 36415 COLL VENOUS BLD VENIPUNCTURE: CPT | Mod: HCNC

## 2020-12-09 PROCEDURE — 25000003 PHARM REV CODE 250: Mod: HCNC | Performed by: NURSE PRACTITIONER

## 2020-12-09 PROCEDURE — 80048 BASIC METABOLIC PNL TOTAL CA: CPT | Mod: HCNC

## 2020-12-09 PROCEDURE — 96376 TX/PRO/DX INJ SAME DRUG ADON: CPT

## 2020-12-09 PROCEDURE — 83540 ASSAY OF IRON: CPT | Mod: HCNC

## 2020-12-09 PROCEDURE — 96372 THER/PROPH/DIAG INJ SC/IM: CPT | Mod: HCNC

## 2020-12-09 PROCEDURE — 63600175 PHARM REV CODE 636 W HCPCS: Mod: HCNC | Performed by: EMERGENCY MEDICINE

## 2020-12-09 PROCEDURE — 99223 1ST HOSP IP/OBS HIGH 75: CPT | Mod: HCNC,,, | Performed by: INTERNAL MEDICINE

## 2020-12-09 PROCEDURE — 99900037 HC PT THERAPY SCREENING (STAT): Mod: HCNC

## 2020-12-09 PROCEDURE — 51702 INSERT TEMP BLADDER CATH: CPT | Mod: HCNC

## 2020-12-09 PROCEDURE — 99223 PR INITIAL HOSPITAL CARE,LEVL III: ICD-10-PCS | Mod: HCNC,,, | Performed by: INTERNAL MEDICINE

## 2020-12-09 PROCEDURE — 85025 COMPLETE CBC W/AUTO DIFF WBC: CPT | Mod: HCNC

## 2020-12-09 RX ORDER — LANOLIN ALCOHOL/MO/W.PET/CERES
800 CREAM (GRAM) TOPICAL
Status: DISCONTINUED | OUTPATIENT
Start: 2020-12-09 | End: 2020-12-10 | Stop reason: HOSPADM

## 2020-12-09 RX ORDER — ONDANSETRON 2 MG/ML
8 INJECTION INTRAMUSCULAR; INTRAVENOUS EVERY 8 HOURS PRN
Status: DISCONTINUED | OUTPATIENT
Start: 2020-12-09 | End: 2020-12-10 | Stop reason: HOSPADM

## 2020-12-09 RX ORDER — CYANOCOBALAMIN 1000 UG/ML
1000 INJECTION, SOLUTION INTRAMUSCULAR; SUBCUTANEOUS ONCE
Status: COMPLETED | OUTPATIENT
Start: 2020-12-09 | End: 2020-12-09

## 2020-12-09 RX ORDER — GLUCAGON 1 MG
1 KIT INJECTION
Status: DISCONTINUED | OUTPATIENT
Start: 2020-12-09 | End: 2020-12-10 | Stop reason: HOSPADM

## 2020-12-09 RX ORDER — IBUPROFEN 200 MG
16 TABLET ORAL
Status: DISCONTINUED | OUTPATIENT
Start: 2020-12-09 | End: 2020-12-10 | Stop reason: HOSPADM

## 2020-12-09 RX ORDER — IBUPROFEN 200 MG
24 TABLET ORAL
Status: DISCONTINUED | OUTPATIENT
Start: 2020-12-09 | End: 2020-12-10 | Stop reason: HOSPADM

## 2020-12-09 RX ORDER — SODIUM CHLORIDE 0.9 % (FLUSH) 0.9 %
10 SYRINGE (ML) INJECTION
Status: DISCONTINUED | OUTPATIENT
Start: 2020-12-09 | End: 2020-12-10 | Stop reason: HOSPADM

## 2020-12-09 RX ORDER — DIGOXIN 0.25 MG/ML
250 INJECTION INTRAMUSCULAR; INTRAVENOUS ONCE
Status: COMPLETED | OUTPATIENT
Start: 2020-12-09 | End: 2020-12-09

## 2020-12-09 RX ORDER — SODIUM,POTASSIUM PHOSPHATES 280-250MG
2 POWDER IN PACKET (EA) ORAL
Status: DISCONTINUED | OUTPATIENT
Start: 2020-12-09 | End: 2020-12-10 | Stop reason: HOSPADM

## 2020-12-09 RX ORDER — TALC
6 POWDER (GRAM) TOPICAL NIGHTLY PRN
Status: DISCONTINUED | OUTPATIENT
Start: 2020-12-09 | End: 2020-12-10 | Stop reason: HOSPADM

## 2020-12-09 RX ADMIN — DIGOXIN 250 MCG: 0.25 INJECTION INTRAMUSCULAR; INTRAVENOUS at 01:12

## 2020-12-09 RX ADMIN — CYANOCOBALAMIN 1000 MCG: 1000 INJECTION, SOLUTION INTRAMUSCULAR; SUBCUTANEOUS at 09:12

## 2020-12-09 RX ADMIN — METOPROLOL TARTRATE 100 MG: 50 TABLET, FILM COATED ORAL at 09:12

## 2020-12-09 RX ADMIN — METOPROLOL TARTRATE 100 MG: 50 TABLET, FILM COATED ORAL at 08:12

## 2020-12-09 RX ADMIN — IRON SUCROSE 200 MG: 20 INJECTION, SOLUTION INTRAVENOUS at 09:12

## 2020-12-09 RX ADMIN — PIPERACILLIN AND TAZOBACTAM 4.5 G: 4; .5 INJECTION, POWDER, LYOPHILIZED, FOR SOLUTION INTRAVENOUS; PARENTERAL at 08:12

## 2020-12-09 RX ADMIN — PIPERACILLIN AND TAZOBACTAM 4.5 G: 4; .5 INJECTION, POWDER, LYOPHILIZED, FOR SOLUTION INTRAVENOUS; PARENTERAL at 11:12

## 2020-12-09 RX ADMIN — APIXABAN 2.5 MG: 2.5 TABLET, FILM COATED ORAL at 09:12

## 2020-12-09 RX ADMIN — PIPERACILLIN AND TAZOBACTAM 4.5 G: 4; .5 INJECTION, POWDER, LYOPHILIZED, FOR SOLUTION INTRAVENOUS; PARENTERAL at 03:12

## 2020-12-09 RX ADMIN — APIXABAN 2.5 MG: 2.5 TABLET, FILM COATED ORAL at 08:12

## 2020-12-09 NOTE — ED NOTES
Called lab about pending urine culture and high risk urine culture order. Per lab, technician is working on it now.

## 2020-12-09 NOTE — HPI
Yan Pickering Jr. is a 80 y.o. male patient with a PMH of A-fib  On oral anticoagulation, diastolic heart failure, chronic kidney disease stage 3, coronary artery disease and venous stasis dermatitis who presents to the ED for evaluation of fever and chills since yesterday.  He was evaluated by Dr. Issa  Who recommended ED evaluation for possible sepsis.   He is followed by Urology for BPH and urinary retention.  He had a indwelling catheter placed however recently he was transition to self catheterization which he has been compliant.    Patient reports intermittent hematuria with catheterization.  In the ED, CXR demonstrated Interval development of left basilar infiltrate and atelectatic change with small bilateral effusions suggested.  Chronic lung changes. He was found to have red blood cells and leukocytes in  urinalysis consistent with UTI.   He was initiated on IV Zosyn in the ED.   He is without complaints of dysuria or frequency.  Denies chest pain, shortness of breath, or nausea.

## 2020-12-09 NOTE — PLAN OF CARE
CM met with patient/Chikis (caregiver) at the bedside to assess for discharge needs.  Patient lives at home and Chikis resides with him.  Chikis is his primary caregiver and assists patient will any needs. Chikis also assists in managing his healthcare.  He uses a rollator with ambulation and has a hospital bed.  He performs self catheterization at home and has catheter supplies.  He does not anticipate any discharge needs at this time.  CM provided a transitional care folder, information on advanced directives, information on pharmacy bedside delivery, and discharge planning begins on admission with contact information for any needs/questions.     D/C Plan: Home, no needs  PCP: Dr sIsa  Preferred Pharmacy: [facility]  Discharge transportation: Chikis  My [facility]: Pending  Pharmacy Bedside Delivery:  No, declined     12/09/20 1550   Discharge Assessment   Assessment Type Discharge Planning Assessment   Confirmed/corrected address and phone number on facesheet? Yes   Assessment information obtained from? Patient;Caregiver;Medical Record   Expected Length of Stay (days)   (1-2)   Communicated expected length of stay with patient/caregiver yes   Prior to hospitilization cognitive status: Alert/Oriented   Prior to hospitalization functional status: Assistive Equipment;Independent   Current cognitive status: Alert/Oriented   Current Functional Status: Independent;Assistive Equipment   Facility Arrived From: Home   Lives With other (see comments)  (Chikis Walker (x-wife))   Able to Return to Prior Arrangements yes   Is patient able to care for self after discharge? Yes   Who are your caregiver(s) and their phone number(s)? Chikis Mastersjoe, ex wife [phone]   Patient's perception of discharge disposition home or selfcare   Readmission Within the Last 30 Days no previous admission in last 30 days   Patient currently being followed by outpatient case management? No   Patient currently receives any other outside agency services? No    Equipment Currently Used at Home rollator;hospital bed;other (see comments)  (urinary catheter supplies)   Do you have any problems affording any of your prescribed medications? No   Is the patient taking medications as prescribed? yes   Does the patient have transportation home? Yes   Transportation Anticipated family or friend will provide   Dialysis Name and Scheduled days NA   Does the patient receive services at the [facility]? No   Discharge Plan A Home with family   Discharge Plan B Home with family   DME Needed Upon Discharge  none   Patient/Family in Agreement with Plan yes

## 2020-12-09 NOTE — ASSESSMENT & PLAN NOTE
Remains in AFIB, variable rate control noted  Continue Eliquis for cardio-embolic protection  Continue Telemetry monitoring  Digoxin x 1 dose today to improve rate control  Continue BB therapy  Would like to transition to Toprol XL given reduced EF

## 2020-12-09 NOTE — ASSESSMENT & PLAN NOTE
Patient's anemia is currently controlled. Has not received any PRBCs to date.. Etiology likely d/t iron def. Check iron studies  Current CBC reviewed-   Lab Results   Component Value Date    HGB 9.6 (L) 12/08/2020    HCT 30.6 (L) 12/08/2020     Monitor serial CBC and transfuse if patient becomes hemodynamically unstable, symptomatic or H/H drops below 7/21.

## 2020-12-09 NOTE — SUBJECTIVE & OBJECTIVE
Past Medical History:   Diagnosis Date    Atrial fibrillation     Cardiomyopathy 4/26/2017    CHF (congestive heart failure)     CKD (chronic kidney disease) stage 3, GFR 30-59 ml/min     Mild mitral insufficiency     Moderate aortic insufficiency     Venous stasis dermatitis of both lower extremities        History reviewed. No pertinent surgical history.    Review of patient's allergies indicates:   Allergen Reactions    Doxycycline Other (See Comments)     abd pain, bloating       No current facility-administered medications on file prior to encounter.      Current Outpatient Medications on File Prior to Encounter   Medication Sig    apixaban (ELIQUIS) 2.5 mg Tab Take 1 tablet (2.5 mg total) by mouth 2 (two) times daily.    furosemide (LASIX) 40 MG tablet Every MWF    losartan (COZAAR) 100 MG tablet Take 100 mg by mouth once daily.    metoprolol tartrate (LOPRESSOR) 100 MG tablet Take 1 tablet (100 mg total) by mouth 2 (two) times daily.     Family History     Problem Relation (Age of Onset)    No Known Problems Mother        Tobacco Use    Smoking status: Never Smoker    Smokeless tobacco: Never Used   Substance and Sexual Activity    Alcohol use: No    Drug use: No    Sexual activity: Not Currently     Review of Systems   Constitution: Positive for chills, fever and malaise/fatigue.   HENT: Negative for hearing loss and hoarse voice.    Eyes: Negative for blurred vision and visual disturbance.   Cardiovascular: Positive for irregular heartbeat. Negative for chest pain, claudication, dyspnea on exertion, leg swelling, near-syncope, orthopnea, palpitations, paroxysmal nocturnal dyspnea and syncope.   Respiratory: Negative for cough, hemoptysis, shortness of breath, sleep disturbances due to breathing, snoring and wheezing.    Endocrine: Negative for cold intolerance and heat intolerance.   Hematologic/Lymphatic: Bruises/bleeds easily (on eliquis).   Skin: Negative for color change, dry skin and  nail changes.   Musculoskeletal: Positive for arthritis and back pain. Negative for joint pain and myalgias.   Gastrointestinal: Negative for bloating, abdominal pain, constipation, nausea and vomiting.   Genitourinary: Positive for incomplete emptying. Negative for dysuria, flank pain, hematuria and hesitancy.        H/o self catherizations   Neurological: Negative for headaches, light-headedness, loss of balance, numbness, paresthesias and weakness.   Psychiatric/Behavioral: Negative for altered mental status.   Allergic/Immunologic: Negative for environmental allergies.     Objective:     Vital Signs (Most Recent):  Temp: 97.9 °F (36.6 °C) (12/09/20 1120)  Pulse: 110 (12/09/20 1120)  Resp: 16 (12/09/20 1120)  BP: (!) 96/57 (12/09/20 1120)  SpO2: 95 % (12/09/20 1120) Vital Signs (24h Range):  Temp:  [97.9 °F (36.6 °C)-99.3 °F (37.4 °C)] 97.9 °F (36.6 °C)  Pulse:  [106-165] 110  Resp:  [16-20] 16  SpO2:  [94 %-98 %] 95 %  BP: ()/(54-81) 96/57     Weight: 57.7 kg (127 lb 3.3 oz)  Body mass index is 20.53 kg/m².    SpO2: 95 %  O2 Device (Oxygen Therapy): room air      Intake/Output Summary (Last 24 hours) at 12/9/2020 1200  Last data filed at 12/9/2020 0818  Gross per 24 hour   Intake 1200 ml   Output --   Net 1200 ml       Lines/Drains/Airways     Drain                 Urethral Catheter 12/09/20 0527 Latex 16 Fr. less than 1 day          Peripheral Intravenous Line                 Peripheral IV - Single Lumen 12/08/20 1540 20 G Right Antecubital less than 1 day                Physical Exam   Constitutional: He is oriented to person, place, and time. He appears well-developed and well-nourished. No distress.   HENT:   Head: Normocephalic and atraumatic.   Eyes: Pupils are equal, round, and reactive to light.   Neck: Normal range of motion and full passive range of motion without pain. Neck supple. No JVD present.   Cardiovascular: S1 normal, S2 normal and intact distal pulses. An irregularly irregular rhythm  present. Tachycardia present. PMI is not displaced. Exam reveals no distant heart sounds.   No murmur heard.  Pulses:       Radial pulses are 2+ on the right side and 2+ on the left side.        Dorsalis pedis pulses are 2+ on the right side and 2+ on the left side.   Remains in AFIB, variable rate control   Pulmonary/Chest: Effort normal and breath sounds normal. No accessory muscle usage. No respiratory distress. He has no decreased breath sounds. He has no wheezes. He has no rales.   Abdominal: Soft. Bowel sounds are normal. He exhibits no distension. There is no abdominal tenderness.   Musculoskeletal: Normal range of motion.         General: No edema.      Right ankle: He exhibits no swelling.      Left ankle: He exhibits no swelling.   Neurological: He is alert and oriented to person, place, and time.   Skin: Skin is warm and dry. He is not diaphoretic. No cyanosis. Nails show no clubbing.   Psychiatric: He has a normal mood and affect. His speech is normal and behavior is normal. Judgment and thought content normal. Cognition and memory are normal.   Nursing note and vitals reviewed.      Significant Labs:   BMP:   Recent Labs   Lab 12/08/20  1540 12/09/20  0625   * 85    137   K 4.9 4.2    108   CO2 22* 22*   BUN 36* 35*   CREATININE 2.3* 1.8*   CALCIUM 8.4* 8.0*   , CBC   Recent Labs   Lab 12/08/20  1540 12/09/20  0625   WBC 9.50 10.93   HGB 9.6* 9.2*   HCT 30.6* 31.0*    154   , Troponin No results for input(s): TROPONINI in the last 48 hours. and All pertinent lab results from the last 24 hours have been reviewed.    Significant Imaging: Echocardiogram:   Transthoracic echo (TTE) complete (Cupid Only):   Results for orders placed or performed during the hospital encounter of 10/15/20   Echo Color Flow Doppler? Yes   Result Value Ref Range    Ascending aorta 4.51 cm    STJ 3.70 cm    IVS 0.70 0.6 - 1.1 cm    LA size 6.44 cm    Left Atrium Major Axis 6.68 cm    Left Atrium Minor  Axis 6.10 cm    LVIDd 4.50 3.5 - 6.0 cm    LVIDs 2.76 2.1 - 4.0 cm    LVOT diameter 1.96 cm    Posterior Wall 1.03 0.6 - 1.1 cm    RA Major Axis 6.14 cm    RA Width 5.65 cm    TAPSE 2.30 cm    TR Max Aguilar 3.50 m/s    TDI LATERAL 0.19 m/s    TDI SEPTAL 0.11 m/s    LA WIDTH 4.50 cm    Ao root annulus 3.63 cm    LV Diastolic Volume 92.42 mL    LV Systolic Volume 28.40 mL    Mr max aguilar 0.04 m/s    FS 39 %    LA volume 157.08 cm3    LV mass 125.97 g    Left Ventricle Relative Wall Thickness 0.46 cm    Mean e' 0.15 m/s    LVOT area 3.0 cm2    LV Systolic Volume Index 17.3 mL/m2    LV Diastolic Volume Index 56.42 mL/m2    LA Volume Index 95.9 mL/m2    LV Mass Index 77 g/m2    Triscuspid Valve Regurgitation Peak Gradient 49 mmHg    BSA 1.62 m2    Right Atrial Pressure (from IVC) 8 mmHg    TV rest pulmonary artery pressure 57 mmHg    Narrative    · The left ventricle is normal in size with mildly decreased systolic   function. The estimated ejection fraction is 45%.  · Diastolic dysfunction.  · Low normal right ventricular systolic function.  · The estimated PA systolic pressure is 57 mmHg.  · Moderate mitral regurgitation.  · Mild aortic regurgitation.  · Mild to moderate tricuspid regurgitation.

## 2020-12-09 NOTE — PROGRESS NOTES
Pharmacist Renal Dose Adjustment Note    Yan Pickering Jr. is a 80 y.o. male being treated with the medication Zosyn.    Patient Data:    Vital Signs (Most Recent):  Temp: 98 °F (36.7 °C) (12/08/20 2348)  Pulse: (!) 120 (12/08/20 2348)  Resp: 19 (12/08/20 2348)  BP: (!) 113/54 (12/08/20 2348)  SpO2: 97 % (12/08/20 2348)   Vital Signs (72h Range):  Temp:  [98 °F (36.7 °C)-99.3 °F (37.4 °C)]   Pulse:  [109-126]   Resp:  [18-20]   BP: (101-126)/(54-81)   SpO2:  [95 %-98 %]      Recent Labs   Lab 12/08/20  1540   CREATININE 2.3*     Serum creatinine: 2.3 mg/dL (H) 12/08/20 1540  Estimated creatinine clearance: 19.5 mL/min (A)    Medication: Zosyn 4.5 gm IV q8h will be changed to Zosyn 4.5 gm IV q12h.    Pharmacist's Name: Abhijeet Littlejohn

## 2020-12-09 NOTE — SUBJECTIVE & OBJECTIVE
Past Medical History:   Diagnosis Date    Atrial fibrillation     Cardiomyopathy 4/26/2017    CHF (congestive heart failure)     CKD (chronic kidney disease) stage 3, GFR 30-59 ml/min     Coronary artery disease     Mild mitral insufficiency     Moderate aortic insufficiency     Venous stasis dermatitis of both lower extremities        History reviewed. No pertinent surgical history.    Review of patient's allergies indicates:   Allergen Reactions    Doxycycline Other (See Comments)     abd pain, bloating       No current facility-administered medications on file prior to encounter.      Current Outpatient Medications on File Prior to Encounter   Medication Sig    apixaban (ELIQUIS) 2.5 mg Tab Take 1 tablet (2.5 mg total) by mouth 2 (two) times daily.    furosemide (LASIX) 40 MG tablet Every MWF    metoprolol tartrate (LOPRESSOR) 100 MG tablet Take 1 tablet (100 mg total) by mouth 2 (two) times daily.     Family History     Problem Relation (Age of Onset)    Heart disease Father    No Known Problems Mother        Tobacco Use    Smoking status: Never Smoker    Smokeless tobacco: Never Used   Substance and Sexual Activity    Alcohol use: No    Drug use: No    Sexual activity: Not Currently     Review of Systems   Constitutional: Positive for fatigue. Negative for appetite change, chills and diaphoresis.   HENT: Negative for congestion, hearing loss and sore throat.    Eyes: Negative for pain and itching.   Respiratory: Negative for cough, shortness of breath (LUNA) and wheezing.    Cardiovascular: Positive for leg swelling. Negative for chest pain and palpitations.        Atrial fibrillation    Gastrointestinal: Negative for abdominal pain, blood in stool and nausea.   Endocrine: Negative for polyphagia and polyuria.   Genitourinary: Negative for dysuria, flank pain and hematuria.   Musculoskeletal: Positive for arthralgias and back pain. Negative for myalgias.   Skin: Negative for color change and  pallor.        bruising   Neurological: Negative for dizziness, syncope and light-headedness.   Hematological: Negative for adenopathy.   Psychiatric/Behavioral: Negative for agitation and confusion. The patient is not nervous/anxious.      Objective:     Vital Signs (Most Recent):  Temp: 99.3 °F (37.4 °C) (12/08/20 1900)  Pulse: (!) 118 (12/08/20 1900)  Resp: 18 (12/08/20 1900)  BP: 109/81 (12/08/20 1900)  SpO2: 95 % (12/08/20 1900) Vital Signs (24h Range):  Temp:  [98.3 °F (36.8 °C)-99.3 °F (37.4 °C)] 99.3 °F (37.4 °C)  Pulse:  [113-118] 118  Resp:  [18-20] 18  SpO2:  [95 %] 95 %  BP: (101-109)/(58-81) 109/81     Weight: 55.6 kg (122 lb 9.2 oz)  Body mass index is 19.78 kg/m².    Physical Exam  Vitals signs and nursing note reviewed.   Constitutional:       Appearance: Normal appearance. He is well-developed.   HENT:      Head: Normocephalic and atraumatic.      Right Ear: External ear normal.      Left Ear: External ear normal.      Nose: Nose normal.      Mouth/Throat:      Mouth: Mucous membranes are moist.   Eyes:      Conjunctiva/sclera: Conjunctivae normal.      Pupils: Pupils are equal, round, and reactive to light.   Neck:      Musculoskeletal: Normal range of motion and neck supple.   Cardiovascular:      Rate and Rhythm: Normal rate. Rhythm irregular.      Heart sounds: Murmur present.   Pulmonary:      Effort: Pulmonary effort is normal.      Breath sounds: Normal breath sounds. No wheezing.      Comments: Decreased at base  Abdominal:      General: Bowel sounds are normal.      Palpations: Abdomen is soft.      Tenderness: There is no abdominal tenderness.   Genitourinary:     Rectum: Guaiac result negative.   Musculoskeletal: Normal range of motion.   Skin:     General: Skin is warm and dry.      Findings: Bruising (bilateral upper ext) present.      Comments: Small scab to left mid shin, chronic skin changes   Neurological:      General: No focal deficit present.      Mental Status: He is alert.  Mental status is at baseline.      Motor: No weakness.      Comments: Oriented x 2   Psychiatric:         Mood and Affect: Mood normal.         Behavior: Behavior normal.           CRANIAL NERVES     CN III, IV, VI   Pupils are equal, round, and reactive to light.       Significant Labs:   BMP:   Recent Labs   Lab 12/08/20  1540   *      K 4.9      CO2 22*   BUN 36*   CREATININE 2.3*   CALCIUM 8.4*     CBC:   Recent Labs   Lab 12/08/20  1540   WBC 9.50   HGB 9.6*   HCT 30.6*          Significant Imaging: I have reviewed and interpreted all pertinent imaging results/findings within the past 24 hours.

## 2020-12-09 NOTE — CONSULTS
[facility]  Cardiology  Consult Note    Patient Name: Yan Pickering Jr.  MRN: [id number]  Admission Date: 12/8/2020  Hospital Length of Stay: 0 days  Code Status: Full Code   Attending Provider: Manohar Romero MD   Consulting Provider: CHRISTIANA Proctor  Primary Care Physician: Brenden Issa MD  Principal Problem:Pneumonia of left lower lobe due to infectious organism    Patient information was obtained from patient, past medical records and ER records.     Inpatient consult to Cardiology  Consult performed by: CHRISTIANA Manzano  Consult ordered by: Jose A Knowles MD        Subjective:     Chief Complaint:  Fever, chills     HPI:   Yan Pickering Jr. is a 80 year old male who presented to [facility] due to fever and chills and sent from Primary care for concerns of sepsis. His current medical conditions include AFIB on Eliquis, Chronic systolic CHF, HFrEF of 45%, CKD stage III, CAD, Venous stasis dermatitis, BPH, Urinary retention. ED workup revealed CXR with interval development of left basilar infiltrate and atelectatic lung changes. UA consistent with UTI and was subsequently started on IV  Zosyn in ED. Cardiology consulted to assist with medical management. Chart reviewed, patient seen and examined. Remains in AFIB/variable rate control today. Denies any cardiac complaints on exam today. NO shortness of breath, LUNA or palpitations. No leg swelling or claudications. Will given 1 dose of digoxin today to improve rate control and assess response in AM.    Past Medical History:   Diagnosis Date    Atrial fibrillation     Cardiomyopathy 4/26/2017    CHF (congestive heart failure)     CKD (chronic kidney disease) stage 3, GFR 30-59 ml/min     Mild mitral insufficiency     Moderate aortic insufficiency     Venous stasis dermatitis of both lower extremities        History reviewed. No pertinent surgical history.    Review of patient's allergies indicates:   Allergen Reactions    Doxycycline Other  (See Comments)     abd pain, bloating       No current facility-administered medications on file prior to encounter.      Current Outpatient Medications on File Prior to Encounter   Medication Sig    apixaban (ELIQUIS) 2.5 mg Tab Take 1 tablet (2.5 mg total) by mouth 2 (two) times daily.    furosemide (LASIX) 40 MG tablet Every MWF    losartan (COZAAR) 100 MG tablet Take 100 mg by mouth once daily.    metoprolol tartrate (LOPRESSOR) 100 MG tablet Take 1 tablet (100 mg total) by mouth 2 (two) times daily.     Family History     Problem Relation (Age of Onset)    No Known Problems Mother        Tobacco Use    Smoking status: Never Smoker    Smokeless tobacco: Never Used   Substance and Sexual Activity    Alcohol use: No    Drug use: No    Sexual activity: Not Currently     Review of Systems   Constitution: Positive for chills, fever and malaise/fatigue.   HENT: Negative for hearing loss and hoarse voice.    Eyes: Negative for blurred vision and visual disturbance.   Cardiovascular: Positive for irregular heartbeat. Negative for chest pain, claudication, dyspnea on exertion, leg swelling, near-syncope, orthopnea, palpitations, paroxysmal nocturnal dyspnea and syncope.   Respiratory: Negative for cough, hemoptysis, shortness of breath, sleep disturbances due to breathing, snoring and wheezing.    Endocrine: Negative for cold intolerance and heat intolerance.   Hematologic/Lymphatic: Bruises/bleeds easily (on eliquis).   Skin: Negative for color change, dry skin and nail changes.   Musculoskeletal: Positive for arthritis and back pain. Negative for joint pain and myalgias.   Gastrointestinal: Negative for bloating, abdominal pain, constipation, nausea and vomiting.   Genitourinary: Positive for incomplete emptying. Negative for dysuria, flank pain, hematuria and hesitancy.        H/o self catherizations   Neurological: Negative for headaches, light-headedness, loss of balance, numbness, paresthesias and  weakness.   Psychiatric/Behavioral: Negative for altered mental status.   Allergic/Immunologic: Negative for environmental allergies.     Objective:     Vital Signs (Most Recent):  Temp: 97.9 °F (36.6 °C) (12/09/20 1120)  Pulse: 110 (12/09/20 1120)  Resp: 16 (12/09/20 1120)  BP: (!) 96/57 (12/09/20 1120)  SpO2: 95 % (12/09/20 1120) Vital Signs (24h Range):  Temp:  [97.9 °F (36.6 °C)-99.3 °F (37.4 °C)] 97.9 °F (36.6 °C)  Pulse:  [106-165] 110  Resp:  [16-20] 16  SpO2:  [94 %-98 %] 95 %  BP: ()/(54-81) 96/57     Weight: 57.7 kg (127 lb 3.3 oz)  Body mass index is 20.53 kg/m².    SpO2: 95 %  O2 Device (Oxygen Therapy): room air      Intake/Output Summary (Last 24 hours) at 12/9/2020 1200  Last data filed at 12/9/2020 0818  Gross per 24 hour   Intake 1200 ml   Output --   Net 1200 ml       Lines/Drains/Airways     Drain                 Urethral Catheter 12/09/20 0527 Latex 16 Fr. less than 1 day          Peripheral Intravenous Line                 Peripheral IV - Single Lumen 12/08/20 1540 20 G Right Antecubital less than 1 day                Physical Exam   Constitutional: He is oriented to person, place, and time. He appears well-developed and well-nourished. No distress.   HENT:   Head: Normocephalic and atraumatic.   Eyes: Pupils are equal, round, and reactive to light.   Neck: Normal range of motion and full passive range of motion without pain. Neck supple. No JVD present.   Cardiovascular: S1 normal, S2 normal and intact distal pulses. An irregularly irregular rhythm present. Tachycardia present. PMI is not displaced. Exam reveals no distant heart sounds.   No murmur heard.  Pulses:       Radial pulses are 2+ on the right side and 2+ on the left side.        Dorsalis pedis pulses are 2+ on the right side and 2+ on the left side.   Remains in AFIB, variable rate control   Pulmonary/Chest: Effort normal and breath sounds normal. No accessory muscle usage. No respiratory distress. He has no decreased breath  sounds. He has no wheezes. He has no rales.   Abdominal: Soft. Bowel sounds are normal. He exhibits no distension. There is no abdominal tenderness.   Musculoskeletal: Normal range of motion.         General: No edema.      Right ankle: He exhibits no swelling.      Left ankle: He exhibits no swelling.   Neurological: He is alert and oriented to person, place, and time.   Skin: Skin is warm and dry. He is not diaphoretic. No cyanosis. Nails show no clubbing.   Psychiatric: He has a normal mood and affect. His speech is normal and behavior is normal. Judgment and thought content normal. Cognition and memory are normal.   Nursing note and vitals reviewed.      Significant Labs:   BMP:   Recent Labs   Lab 12/08/20  1540 12/09/20  0625   * 85    137   K 4.9 4.2    108   CO2 22* 22*   BUN 36* 35*   CREATININE 2.3* 1.8*   CALCIUM 8.4* 8.0*   , CBC   Recent Labs   Lab 12/08/20  1540 12/09/20  0625   WBC 9.50 10.93   HGB 9.6* 9.2*   HCT 30.6* 31.0*    154   , Troponin No results for input(s): TROPONINI in the last 48 hours. and All pertinent lab results from the last 24 hours have been reviewed.    Significant Imaging: Echocardiogram:   Transthoracic echo (TTE) complete (Cupid Only):   Results for orders placed or performed during the hospital encounter of 10/15/20   Echo Color Flow Doppler? Yes   Result Value Ref Range    Ascending aorta 4.51 cm    STJ 3.70 cm    IVS 0.70 0.6 - 1.1 cm    LA size 6.44 cm    Left Atrium Major Axis 6.68 cm    Left Atrium Minor Axis 6.10 cm    LVIDd 4.50 3.5 - 6.0 cm    LVIDs 2.76 2.1 - 4.0 cm    LVOT diameter 1.96 cm    Posterior Wall 1.03 0.6 - 1.1 cm    RA Major Axis 6.14 cm    RA Width 5.65 cm    TAPSE 2.30 cm    TR Max Aguilar 3.50 m/s    TDI LATERAL 0.19 m/s    TDI SEPTAL 0.11 m/s    LA WIDTH 4.50 cm    Ao root annulus 3.63 cm    LV Diastolic Volume 92.42 mL    LV Systolic Volume 28.40 mL    Mr max aguilar 0.04 m/s    FS 39 %    LA volume 157.08 cm3    LV mass 125.97 g     Left Ventricle Relative Wall Thickness 0.46 cm    Mean e' 0.15 m/s    LVOT area 3.0 cm2    LV Systolic Volume Index 17.3 mL/m2    LV Diastolic Volume Index 56.42 mL/m2    LA Volume Index 95.9 mL/m2    LV Mass Index 77 g/m2    Triscuspid Valve Regurgitation Peak Gradient 49 mmHg    BSA 1.62 m2    Right Atrial Pressure (from IVC) 8 mmHg    TV rest pulmonary artery pressure 57 mmHg    Narrative    · The left ventricle is normal in size with mildly decreased systolic   function. The estimated ejection fraction is 45%.  · Diastolic dysfunction.  · Low normal right ventricular systolic function.  · The estimated PA systolic pressure is 57 mmHg.  · Moderate mitral regurgitation.  · Mild aortic regurgitation.  · Mild to moderate tricuspid regurgitation.        Assessment and Plan:     * Pneumonia of left lower lobe due to infectious organism  On IV ABX   Mgmt per primary team    Chronic atrial fibrillation  Remains in AFIB, variable rate control noted  Continue Eliquis for cardio-embolic protection  Continue Telemetry monitoring  Digoxin x 1 dose today to improve rate control  Continue BB therapy  Would like to transition to Toprol XL given reduced EF    CKD (chronic kidney disease) stage 4, GFR 15-29 ml/min  Monitor closely with daily labs        VTE Risk Mitigation (From admission, onward)         Ordered     IP VTE HIGH RISK PATIENT  Once      12/09/20 0026     Place sequential compression device  Until discontinued      12/09/20 0026     Reason for No Pharmacological VTE Prophylaxis  Once     Question:  Reasons:  Answer:  Already adequately anticoagulated on oral Anticoagulants    12/09/20 0026     apixaban tablet 2.5 mg  2 times daily      12/08/20 2221                Thank you for your consult. I will follow-up with patient. Please contact us if you have any additional questions.    ELAN Proctor-C  Cardiology   [facility]

## 2020-12-09 NOTE — PLAN OF CARE
Patient A&Ox4.  Room air.  Afib on telemetry. This AM patient was Afib RVR. One time dose of IV dig given with results.  Cardiac diet.  Bustamante catheter in place for retention.  Up with assistance. PT ordered.  IV antibiotics given.  All questions answered.  Will continue to monitor.    Albina Yusuf RN      Problem: Fall Injury Risk  Goal: Absence of Fall and Fall-Related Injury  Outcome: Ongoing, Progressing  Intervention: Identify and Manage Contributors to Fall Injury Risk  Flowsheets (Taken 12/9/2020 1609)  Self-Care Promotion: independence encouraged  Medication Review/Management: medications reviewed     Problem: Adult Inpatient Plan of Care  Goal: Plan of Care Review  Outcome: Ongoing, Progressing  Flowsheets (Taken 12/9/2020 1609)  Plan of Care Reviewed With:   patient   caregiver     Problem: Infection (Pneumonia)  Goal: Resolution of Infection Signs/Symptoms  Outcome: Ongoing, Progressing  Intervention: Prevent Infection Progression  Flowsheets (Taken 12/9/2020 1609)  Isolation Precautions: protective environment maintained     Problem: Arrhythmia/Dysrhythmia  Goal: Normalized Cardiac Rhythm  Outcome: Ongoing, Progressing  Intervention: Monitor and Manage Cardiac Rhythm Effects  Flowsheets (Taken 12/9/2020 1609)  VTE Prevention/Management: bleeding risk assessed     Problem: Infection  Goal: Infection Symptom Resolution  Outcome: Ongoing, Progressing  Intervention: Prevent or Manage Infection  Flowsheets (Taken 12/9/2020 1609)  Isolation Precautions: protective environment maintained

## 2020-12-09 NOTE — H&P
[facility] Hospital Medicine  History & Physical    Patient Name: Yan Pickering Jr.  MRN: [id number]  Admission Date: 12/8/2020  Attending Physician: Tamara Huertas NP  Primary Care Provider: Brenden Issa MD         Patient information was obtained from patient and ER records.     Subjective:     Principal Problem:Pneumonia of left lower lobe due to infectious organism    Chief Complaint:   Chief Complaint   Patient presents with    Fever     and chills started monday, pt self caths and dr. raman office sent over for UA.         HPI: Yan Pickering Jr. is a 80 y.o. male patient with a PMH of A-fib  On oral anticoagulation, diastolic heart failure, chronic kidney disease stage 3, coronary artery disease and venous stasis dermatitis who presents to the ED for evaluation of fever and chills since yesterday.  He was evaluated by Dr. Issa  Who recommended ED evaluation for possible sepsis.   He is followed by Urology for BPH and urinary retention.  He had a indwelling catheter placed however recently he was transition to self catheterization which he has been compliant.    Patient reports intermittent hematuria with catheterization.  In the ED, CXR demonstrated Interval development of left basilar infiltrate and atelectatic change with small bilateral effusions suggested.  Chronic lung changes. He was found to have red blood cells and leukocytes in  urinalysis consistent with UTI.   He was initiated on IV Zosyn in the ED.   He is without complaints of dysuria or frequency.  Denies chest pain, shortness of breath, or nausea. Patient was placed in observation.     Past Medical History:   Diagnosis Date    Atrial fibrillation     Cardiomyopathy 4/26/2017    CHF (congestive heart failure)     CKD (chronic kidney disease) stage 3, GFR 30-59 ml/min     Coronary artery disease     Mild mitral insufficiency     Moderate aortic insufficiency     Venous stasis dermatitis of both lower extremities         History reviewed. No pertinent surgical history.    Review of patient's allergies indicates:   Allergen Reactions    Doxycycline Other (See Comments)     abd pain, bloating       No current facility-administered medications on file prior to encounter.      Current Outpatient Medications on File Prior to Encounter   Medication Sig    apixaban (ELIQUIS) 2.5 mg Tab Take 1 tablet (2.5 mg total) by mouth 2 (two) times daily.    furosemide (LASIX) 40 MG tablet Every MWF    metoprolol tartrate (LOPRESSOR) 100 MG tablet Take 1 tablet (100 mg total) by mouth 2 (two) times daily.     Family History     Problem Relation (Age of Onset)    Heart disease Father    No Known Problems Mother        Tobacco Use    Smoking status: Never Smoker    Smokeless tobacco: Never Used   Substance and Sexual Activity    Alcohol use: No    Drug use: No    Sexual activity: Not Currently     Review of Systems   Constitutional: Positive for fatigue. Negative for appetite change, chills and diaphoresis.   HENT: Negative for congestion, hearing loss and sore throat.    Eyes: Negative for pain and itching.   Respiratory: Negative for cough, shortness of breath (LUNA) and wheezing.    Cardiovascular: Positive for leg swelling. Negative for chest pain and palpitations.        Atrial fibrillation    Gastrointestinal: Negative for abdominal pain, blood in stool and nausea.   Endocrine: Negative for polyphagia and polyuria.   Genitourinary: Negative for dysuria, flank pain and hematuria.   Musculoskeletal: Positive for arthralgias and back pain. Negative for myalgias.   Skin: Negative for color change and pallor.        bruising   Neurological: Negative for dizziness, syncope and light-headedness.   Hematological: Negative for adenopathy.   Psychiatric/Behavioral: Negative for agitation and confusion. The patient is not nervous/anxious.      Objective:     Vital Signs (Most Recent):  Temp: 99.3 °F (37.4 °C) (12/08/20 1900)  Pulse: (!) 118  (12/08/20 1900)  Resp: 18 (12/08/20 1900)  BP: 109/81 (12/08/20 1900)  SpO2: 95 % (12/08/20 1900) Vital Signs (24h Range):  Temp:  [98.3 °F (36.8 °C)-99.3 °F (37.4 °C)] 99.3 °F (37.4 °C)  Pulse:  [113-118] 118  Resp:  [18-20] 18  SpO2:  [95 %] 95 %  BP: (101-109)/(58-81) 109/81     Weight: 55.6 kg (122 lb 9.2 oz)  Body mass index is 19.78 kg/m².    Physical Exam  Vitals signs and nursing note reviewed.   Constitutional:       Appearance: Normal appearance. He is well-developed.   HENT:      Head: Normocephalic and atraumatic.      Right Ear: External ear normal.      Left Ear: External ear normal.      Nose: Nose normal.      Mouth/Throat:      Mouth: Mucous membranes are moist.   Eyes:      Conjunctiva/sclera: Conjunctivae normal.      Pupils: Pupils are equal, round, and reactive to light.   Neck:      Musculoskeletal: Normal range of motion and neck supple.   Cardiovascular:      Rate and Rhythm: Normal rate. Rhythm irregular.      Heart sounds: Murmur present.   Pulmonary:      Effort: Pulmonary effort is normal.      Breath sounds: Normal breath sounds. No wheezing.      Comments: Decreased at base  Abdominal:      General: Bowel sounds are normal.      Palpations: Abdomen is soft.      Tenderness: There is no abdominal tenderness.   Genitourinary:     Rectum: Guaiac result negative.   Musculoskeletal: Normal range of motion.   Skin:     General: Skin is warm and dry.      Findings: Bruising (bilateral upper ext) present.      Comments: Small scab to left mid shin, chronic skin changes   Neurological:      General: No focal deficit present.      Mental Status: He is alert. Mental status is at baseline.      Motor: No weakness.      Comments: Oriented x 2   Psychiatric:         Mood and Affect: Mood normal.         Behavior: Behavior normal.           CRANIAL NERVES     CN III, IV, VI   Pupils are equal, round, and reactive to light.       Significant Labs:   BMP:   Recent Labs   Lab 12/08/20  1540   *       K 4.9      CO2 22*   BUN 36*   CREATININE 2.3*   CALCIUM 8.4*     CBC:   Recent Labs   Lab 12/08/20  1540   WBC 9.50   HGB 9.6*   HCT 30.6*          Significant Imaging: I have reviewed and interpreted all pertinent imaging results/findings within the past 24 hours.    Assessment/Plan:     * Pneumonia of left lower lobe due to infectious organism   Noted on chest x-ray.  He was initiated on IV Zosyn.  Monitor oxygen saturation.      Benign prostatic hyperplasia with incomplete bladder emptying    In and out catheterizations.      Anemia of chronic disease  Patient's anemia is currently controlled. Has not received any PRBCs to date.. Etiology likely d/t iron def. Check iron studies  Current CBC reviewed-   Lab Results   Component Value Date    HGB 9.6 (L) 12/08/2020    HCT 30.6 (L) 12/08/2020     Monitor serial CBC and transfuse if patient becomes hemodynamically unstable, symptomatic or H/H drops below 7/21.         Chronic atrial fibrillation  Patient with Permanent atrial fibrillation which is controlled currently with Beta Blocker. Patient is currently in atrial fibrillation. [id number] score 5. Anticoagulation indicated. Anticoagulation done with OAC.        Venous stasis dermatitis of both lower extremities    Chronic.   Monitor skin.  Patient with healing scabs to left lower extremity      CKD (chronic kidney disease) stage 4, GFR 15-29 ml/min  Creatine stable for now. BMP reviewed- noted Estimated Creatinine Clearance: 19.5 mL/min (A) (based on SCr of 2.3 mg/dL (H)). according to latest data. Monitor UOP and serial BMP and adjust therapy as needed. Renally dose meds.              VTE Risk Mitigation (From admission, onward)    None             Tamara Huertas NP  Department of Hospital Medicine   [facility]

## 2020-12-09 NOTE — ASSESSMENT & PLAN NOTE
Patient with Permanent atrial fibrillation which is controlled currently with Beta Blocker. Patient is currently in atrial fibrillation. [id number] score 5. Anticoagulation indicated. Anticoagulation done with OAC.

## 2020-12-09 NOTE — ASSESSMENT & PLAN NOTE
Creatine stable for now. BMP reviewed- noted Estimated Creatinine Clearance: 19.5 mL/min (A) (based on SCr of 2.3 mg/dL (H)). according to latest data. Monitor UOP and serial BMP and adjust therapy as needed. Renally dose meds.

## 2020-12-09 NOTE — PROGRESS NOTES
Pharmacist Renal Dose Adjustment Note    Yan Pickering Jr. is a 80 y.o. male being treated with the medication Zosyn    Patient Data:    Vital Signs (Most Recent):  Temp: 97.9 °F (36.6 °C) (12/09/20 1120)  Pulse: 104 (12/09/20 1315)  Resp: 16 (12/09/20 1120)  BP: (!) 96/57 (12/09/20 1120)  SpO2: 95 % (12/09/20 1120)   Vital Signs (72h Range):  Temp:  [97.9 °F (36.6 °C)-99.3 °F (37.4 °C)]   Pulse:  [104-165]   Resp:  [16-20]   BP: ()/(54-81)   SpO2:  [94 %-98 %]      Recent Labs   Lab 12/08/20  1540 12/09/20  0625   CREATININE 2.3* 1.8*     Serum creatinine: 1.8 mg/dL (H) 12/09/20 0625  Estimated creatinine clearance: 25.8 mL/min (A)    Medication:Zosyn dose: 4.5 g frequency q12h will be changed to medication:Zosyn dose:4.5 g frequency:q8h for CrCl > 20.    Pharmacist's Name: Rachelle Bhakta  Pharmacist's Extension: [phone]

## 2020-12-09 NOTE — HPI
Yan Pickering Jr. is a 80 year old male who presented to [facility]- due to fever and chills and sent from Primary care for concerns of sepsis. His current medical conditions include AFIB on Eliquis, Chronic systolic CHF, HFrEF of 45%, CKD stage III, CAD, Venous stasis dermatitis, BPH, Urinary retention. ED workup revealed CXR with interval development of left basilar infiltrate and atelectatic lung changes. UA consistent with UTI and was subsequently started on IV  Zosyn in ED. Cardiology consulted to assist with medical management. Chart reviewed, patient seen and examined. Remains in AFIB/variable rate control today. Denies any cardiac complaints on exam today. NO shortness of breath, LUNA or palpitations. No leg swelling or claudications. Will given 1 dose of digoxin today to improve rate control and assess response in AM.

## 2020-12-09 NOTE — PT/OT/SLP PROGRESS
Physical Therapy      Patient Name:  Yan Pickering Jr.   MRN:  [id number]    0815 P.T. COMPLETED CHART REVIEW. PT NURSE DOROTHEA HANNAH P.T. EVAL D/T PT HR ELEVATED READING [id number]. PT TO COMPLETED EVAL WHEN MEDICALLY STABLE. THANK YOU   Irma Ramirez, PT,12/9/2020

## 2020-12-10 ENCOUNTER — OUTPATIENT CASE MANAGEMENT (OUTPATIENT)
Dept: ADMINISTRATIVE | Facility: OTHER | Age: 80
End: 2020-12-10

## 2020-12-10 VITALS
HEIGHT: 66 IN | HEART RATE: 104 BPM | TEMPERATURE: 98 F | WEIGHT: 127.19 LBS | OXYGEN SATURATION: 94 % | BODY MASS INDEX: 20.44 KG/M2 | DIASTOLIC BLOOD PRESSURE: 78 MMHG | SYSTOLIC BLOOD PRESSURE: 117 MMHG | RESPIRATION RATE: 18 BRPM

## 2020-12-10 PROBLEM — N30.00 ACUTE CYSTITIS WITHOUT HEMATURIA: Status: RESOLVED | Noted: 2020-12-10 | Resolved: 2020-12-10

## 2020-12-10 PROBLEM — J18.9 PNEUMONIA OF LEFT LOWER LOBE DUE TO INFECTIOUS ORGANISM: Status: RESOLVED | Noted: 2020-12-08 | Resolved: 2020-12-10

## 2020-12-10 PROBLEM — N30.00 ACUTE CYSTITIS WITHOUT HEMATURIA: Status: ACTIVE | Noted: 2020-12-10

## 2020-12-10 LAB
ANION GAP SERPL CALC-SCNC: 4 MMOL/L (ref 8–16)
BASOPHILS # BLD AUTO: 0.05 K/UL (ref 0–0.2)
BASOPHILS NFR BLD: 0.6 % (ref 0–1.9)
BUN SERPL-MCNC: 28 MG/DL (ref 8–23)
CALCIUM SERPL-MCNC: 7.8 MG/DL (ref 8.7–10.5)
CHLORIDE SERPL-SCNC: 107 MMOL/L (ref 95–110)
CO2 SERPL-SCNC: 25 MMOL/L (ref 23–29)
CREAT SERPL-MCNC: 1.8 MG/DL (ref 0.5–1.4)
DIFFERENTIAL METHOD: ABNORMAL
EOSINOPHIL # BLD AUTO: 0.1 K/UL (ref 0–0.5)
EOSINOPHIL NFR BLD: 1.4 % (ref 0–8)
ERYTHROCYTE [DISTWIDTH] IN BLOOD BY AUTOMATED COUNT: 16.4 % (ref 11.5–14.5)
EST. GFR  (AFRICAN AMERICAN): 40 ML/MIN/1.73 M^2
EST. GFR  (NON AFRICAN AMERICAN): 34 ML/MIN/1.73 M^2
GLUCOSE SERPL-MCNC: 88 MG/DL (ref 70–110)
HCT VFR BLD AUTO: 28.8 % (ref 40–54)
HGB BLD-MCNC: 8.6 G/DL (ref 14–18)
IMM GRANULOCYTES # BLD AUTO: 0.11 K/UL (ref 0–0.04)
IMM GRANULOCYTES NFR BLD AUTO: 1.2 % (ref 0–0.5)
LYMPHOCYTES # BLD AUTO: 0.7 K/UL (ref 1–4.8)
LYMPHOCYTES NFR BLD: 7.9 % (ref 18–48)
MCH RBC QN AUTO: 29.1 PG (ref 27–31)
MCHC RBC AUTO-ENTMCNC: 29.9 G/DL (ref 32–36)
MCV RBC AUTO: 97 FL (ref 82–98)
MONOCYTES # BLD AUTO: 1.2 K/UL (ref 0.3–1)
MONOCYTES NFR BLD: 13.1 % (ref 4–15)
NEUTROPHILS # BLD AUTO: 6.9 K/UL (ref 1.8–7.7)
NEUTROPHILS NFR BLD: 75.8 % (ref 38–73)
NRBC BLD-RTO: 0 /100 WBC
PLATELET # BLD AUTO: 157 K/UL (ref 150–350)
PMV BLD AUTO: 9.5 FL (ref 9.2–12.9)
POTASSIUM SERPL-SCNC: 4.3 MMOL/L (ref 3.5–5.1)
RBC # BLD AUTO: 2.96 M/UL (ref 4.6–6.2)
SODIUM SERPL-SCNC: 136 MMOL/L (ref 136–145)
WBC # BLD AUTO: 9.09 K/UL (ref 3.9–12.7)

## 2020-12-10 PROCEDURE — 25000003 PHARM REV CODE 250: Mod: HCNC | Performed by: EMERGENCY MEDICINE

## 2020-12-10 PROCEDURE — 63600175 PHARM REV CODE 636 W HCPCS: Mod: HCNC | Performed by: EMERGENCY MEDICINE

## 2020-12-10 PROCEDURE — 63600175 PHARM REV CODE 636 W HCPCS: Mod: HCNC | Performed by: PHYSICIAN ASSISTANT

## 2020-12-10 PROCEDURE — 80048 BASIC METABOLIC PNL TOTAL CA: CPT | Mod: HCNC

## 2020-12-10 PROCEDURE — 25000003 PHARM REV CODE 250: Mod: HCNC | Performed by: NURSE PRACTITIONER

## 2020-12-10 PROCEDURE — 97162 PT EVAL MOD COMPLEX 30 MIN: CPT | Mod: HCNC

## 2020-12-10 PROCEDURE — 99232 PR SUBSEQUENT HOSPITAL CARE,LEVL II: ICD-10-PCS | Mod: HCNC,,, | Performed by: PHYSICIAN ASSISTANT

## 2020-12-10 PROCEDURE — 36415 COLL VENOUS BLD VENIPUNCTURE: CPT | Mod: HCNC

## 2020-12-10 PROCEDURE — 97116 GAIT TRAINING THERAPY: CPT | Mod: HCNC

## 2020-12-10 PROCEDURE — 99232 SBSQ HOSP IP/OBS MODERATE 35: CPT | Mod: HCNC,,, | Performed by: PHYSICIAN ASSISTANT

## 2020-12-10 PROCEDURE — 85025 COMPLETE CBC W/AUTO DIFF WBC: CPT | Mod: HCNC

## 2020-12-10 RX ORDER — MUPIROCIN 20 MG/G
OINTMENT TOPICAL 2 TIMES DAILY
Status: CANCELLED | OUTPATIENT
Start: 2020-12-10 | End: 2020-12-15

## 2020-12-10 RX ORDER — DIGOXIN 0.25 MG/ML
250 INJECTION INTRAMUSCULAR; INTRAVENOUS ONCE
Status: COMPLETED | OUTPATIENT
Start: 2020-12-10 | End: 2020-12-10

## 2020-12-10 RX ORDER — FUROSEMIDE 20 MG/1
TABLET ORAL
Qty: 30 TABLET | Refills: 1
Start: 2020-12-10 | End: 2020-12-22

## 2020-12-10 RX ADMIN — PIPERACILLIN AND TAZOBACTAM 4.5 G: 4; .5 INJECTION, POWDER, LYOPHILIZED, FOR SOLUTION INTRAVENOUS; PARENTERAL at 08:12

## 2020-12-10 RX ADMIN — METOPROLOL TARTRATE 100 MG: 50 TABLET, FILM COATED ORAL at 08:12

## 2020-12-10 RX ADMIN — DIGOXIN 250 MCG: 0.25 INJECTION INTRAMUSCULAR; INTRAVENOUS at 02:12

## 2020-12-10 RX ADMIN — APIXABAN 2.5 MG: 2.5 TABLET, FILM COATED ORAL at 08:12

## 2020-12-10 NOTE — HOSPITAL COURSE
12/10/2020-Patient seen and examined today, sitting up in bedside chair. Feels well overall. No chest pain. SOB improved. Remains in afib, HR variable. Labs reviewed. H/H 8.6/28.8. Creatinine 1.8.

## 2020-12-10 NOTE — PLAN OF CARE
P.T. EVAL COMPLETE, PT CURRENTLY REQUIRES MAXA X 2 FOR BED MOBILITY, COURTNEY FOR TF'S AND GAIT WITH RW, P.T. RECOMMENDS SNF VS HHPT

## 2020-12-10 NOTE — SUBJECTIVE & OBJECTIVE
Interval History: Remains in AFIB/variable rate control today. No CP or SOB LUNA or palpitations. No leg swelling or claudications. Cards gave a dose of digoxin today to improve rate control and assess response in AM. Pt wants to go home, persuaded him to stay another nite.     Review of Systems   Constitutional: Positive for fatigue. Negative for appetite change, chills and diaphoresis.   HENT: Negative for congestion, hearing loss and sore throat.    Eyes: Negative for pain and itching.   Respiratory: Negative for cough, shortness of breath (LUNA) and wheezing.    Cardiovascular: Positive for leg swelling. Negative for chest pain and palpitations.        Atrial fibrillation    Gastrointestinal: Negative for abdominal pain, blood in stool and nausea.   Endocrine: Negative for polyphagia and polyuria.   Genitourinary: Negative for dysuria, flank pain and hematuria.   Musculoskeletal: Positive for arthralgias and back pain. Negative for myalgias.   Skin: Negative for color change and pallor.        bruising   Neurological: Negative for dizziness, syncope and light-headedness.   Hematological: Negative for adenopathy.   Psychiatric/Behavioral: Negative for agitation and confusion. The patient is not nervous/anxious.      Objective:     Vital Signs (Most Recent):  Temp: 97.9 °F (36.6 °C) (12/09/20 1513)  Pulse: 101 (12/09/20 1702)  Resp: 18 (12/09/20 1513)  BP: (!) 100/54 (12/09/20 1513)  SpO2: 97 % (12/09/20 1513) Vital Signs (24h Range):  Temp:  [97.9 °F (36.6 °C)-98.4 °F (36.9 °C)] 97.9 °F (36.6 °C)  Pulse:  [] 101  Resp:  [16-20] 18  SpO2:  [94 %-98 %] 97 %  BP: ()/(54-69) 100/54     Weight: 57.7 kg (127 lb 3.3 oz)  Body mass index is 20.53 kg/m².    Intake/Output Summary (Last 24 hours) at 12/9/2020 2006  Last data filed at 12/9/2020 1601  Gross per 24 hour   Intake 928 ml   Output 600 ml   Net 328 ml      Physical Exam  Vitals signs and nursing note reviewed.   Constitutional:       Appearance: Normal  appearance. He is well-developed.   HENT:      Head: Normocephalic and atraumatic.      Right Ear: External ear normal.      Left Ear: External ear normal.      Nose: Nose normal.      Mouth/Throat:      Mouth: Mucous membranes are moist.   Eyes:      Conjunctiva/sclera: Conjunctivae normal.      Pupils: Pupils are equal, round, and reactive to light.   Neck:      Musculoskeletal: Normal range of motion and neck supple.   Cardiovascular:      Rate and Rhythm: Normal rate. Rhythm irregular.      Heart sounds: Murmur present.   Pulmonary:      Effort: Pulmonary effort is normal.      Breath sounds: Normal breath sounds. No wheezing.      Comments: Decreased at base  Abdominal:      General: Bowel sounds are normal.      Palpations: Abdomen is soft.      Tenderness: There is no abdominal tenderness.   Genitourinary:     Rectum: Guaiac result negative.   Musculoskeletal: Normal range of motion.   Skin:     General: Skin is warm and dry.      Findings: Bruising (bilateral upper ext) present.      Comments: Small scab to left mid shin, chronic skin changes   Neurological:      General: No focal deficit present.      Mental Status: He is alert. Mental status is at baseline.      Motor: No weakness.      Comments: Oriented x 2   Psychiatric:         Mood and Affect: Mood normal.         Behavior: Behavior normal.         Significant Labs:   BMP:   Recent Labs   Lab 12/09/20  0625   GLU 85      K 4.2      CO2 22*   BUN 35*   CREATININE 1.8*   CALCIUM 8.0*     CBC:   Recent Labs   Lab 12/08/20  1540 12/09/20  0625   WBC 9.50 10.93   HGB 9.6* 9.2*   HCT 30.6* 31.0*    154     All pertinent labs within the past 24 hours have been reviewed.    Significant Imaging: I have reviewed all pertinent imaging results/findings within the past 24 hours.

## 2020-12-10 NOTE — PLAN OF CARE
Pt awake and alert; VSS.  Pt remained afebrile throughout this shift.   All meds administered per order.   Pt remained free of falls this shift.   Pt had no c/o pain this shift  Plan of care reviewed. Patient verbalizes understanding.   Pt moving/turning independently.   Patient afib/ st on monitor.   Bed low, side rails up x 2, wheels locked, call light in reach.   Patient instructed to call for assistance.   Hourly rounding completed.   Will continue to monitor

## 2020-12-10 NOTE — ASSESSMENT & PLAN NOTE
Remains in AFIB, variable rate control noted  Continue Eliquis for cardio-embolic protection  Continue Telemetry monitoring  Digoxin x 1 dose today to improve rate control  Continue BB therapy  Would like to transition to Toprol XL given reduced EF    12/10/2020  -HR variable overnight  -Continue Lopressor 100 mg BID  -Additional dose of IV digoxin today  -HR will improve once he recovers from PNA  -Continue Eliquis for AC

## 2020-12-10 NOTE — PROGRESS NOTES
[facility] -   Cardiology  Progress Note    Patient Name: Yan Pickering Jr.  MRN: [id number]  Admission Date: 12/8/2020  Hospital Length of Stay: 1 days  Code Status: Full Code   Attending Physician: Jose A Knowles MD   Primary Care Physician: Brenden Issa MD  Expected Discharge Date:   Principal Problem:Pneumonia of left lower lobe due to infectious organism    Subjective:   HPI:  Yan Pickering Jr. is a 80 year old male who presented to [facility] due to fever and chills and sent from Primary care for concerns of sepsis. His current medical conditions include AFIB on Eliquis, Chronic systolic CHF, HFrEF of 45%, CKD stage III, CAD, Venous stasis dermatitis, BPH, Urinary retention. ED workup revealed CXR with interval development of left basilar infiltrate and atelectatic lung changes. UA consistent with UTI and was subsequently started on IV  Zosyn in ED. Cardiology consulted to assist with medical management. Chart reviewed, patient seen and examined. Remains in AFIB/variable rate control today. Denies any cardiac complaints on exam today. NO shortness of breath, LUNA or palpitations. No leg swelling or claudications. Will given 1 dose of digoxin today to improve rate control and assess response in AM.    Hospital Course:   12/10/2020-Patient seen and examined today, sitting up in bedside chair. Feels well overall. No chest pain. SOB improved. Remains in afib, HR variable. Labs reviewed. H/H 8.6/28.8. Creatinine 1.8.         Review of Systems   Constitution: Positive for malaise/fatigue.   HENT: Negative.    Eyes: Negative.    Cardiovascular: Positive for dyspnea on exertion (improved).   Respiratory: Negative.    Endocrine: Negative.    Hematologic/Lymphatic: Negative.    Skin: Negative.    Musculoskeletal: Positive for arthritis and joint pain.   Gastrointestinal: Negative.    Genitourinary: Negative.    Neurological: Negative.    Psychiatric/Behavioral: Negative.    Allergic/Immunologic: Negative.       Objective:     Vital Signs (Most Recent):  Temp: 97.6 °F (36.4 °C) (12/10/20 0746)  Pulse: (!) 116 (12/10/20 0900)  Resp: 18 (12/10/20 0746)  BP: 111/61 (12/10/20 0746)  SpO2: 95 % (12/10/20 0746) Vital Signs (24h Range):  Temp:  [97.6 °F (36.4 °C)-99.9 °F (37.7 °C)] 97.6 °F (36.4 °C)  Pulse:  [] 116  Resp:  [18] 18  SpO2:  [92 %-97 %] 95 %  BP: (100-114)/(53-61) 111/61     Weight: 57.7 kg (127 lb 3.3 oz)  Body mass index is 20.53 kg/m².     SpO2: 95 %  O2 Device (Oxygen Therapy): room air      Intake/Output Summary (Last 24 hours) at 12/10/2020 1220  Last data filed at 12/10/2020 0900  Gross per 24 hour   Intake 100 ml   Output 1675 ml   Net -1575 ml       Lines/Drains/Airways     Drain                 Urethral Catheter 12/09/20 0527 Latex 16 Fr. 1 day          Peripheral Intravenous Line                 Peripheral IV - Single Lumen 12/08/20 1540 20 G Right Antecubital 1 day                Physical Exam   Constitutional: He is oriented to person, place, and time. He appears well-developed and well-nourished. No distress.   HENT:   Head: Normocephalic and atraumatic.   Eyes: Pupils are equal, round, and reactive to light. Right eye exhibits no discharge. Left eye exhibits no discharge.   Neck: Neck supple. No JVD present.   Cardiovascular: S1 normal, S2 normal and normal heart sounds. An irregularly irregular rhythm present. Tachycardia present.   No murmur heard.  Pulmonary/Chest: Effort normal and breath sounds normal. No respiratory distress. He has no wheezes. He has no rales.   Abdominal: Soft. He exhibits no distension.   Musculoskeletal:         General: No edema.   Neurological: He is alert and oriented to person, place, and time.   Skin: Skin is warm and dry. He is not diaphoretic. No erythema.   CVI changes BLE     Psychiatric: He has a normal mood and affect. His behavior is normal. Thought content normal.   Nursing note and vitals reviewed.      Significant Labs:   [facility]   Recent Labs   Lab  12/08/20  1540 12/09/20  0625 12/10/20  0641    137 136   K 4.9 4.2 4.3    108 107   CO2 22* 22* 25   * 85 88   BUN 36* 35* 28*   CREATININE 2.3* 1.8* 1.8*   CALCIUM 8.4* 8.0* 7.8*   PROT 6.7  --   --    ALBUMIN 2.8*  --   --    BILITOT 0.9  --   --    ALKPHOS 106  --   --    AST 12  --   --    ALT 12  --   --    ANIONGAP 12 7* 4*   ESTGFRAFRICA 29* 40* 40*   EGFRNONAA 25* 34* 34*   , CBC   Recent Labs   Lab 12/08/20  1540 12/09/20  0625 12/10/20  0641   WBC 9.50 10.93 9.09   HGB 9.6* 9.2* 8.6*   HCT 30.6* 31.0* 28.8*    154 157   , Troponin No results for input(s): TROPONINI in the last 48 hours. and All pertinent lab results from the last 24 hours have been reviewed.    Significant Imaging: Echocardiogram:   Transthoracic echo (TTE) complete (Cupid Only):   Results for orders placed or performed during the hospital encounter of 10/15/20   Echo Color Flow Doppler? Yes   Result Value Ref Range    Ascending aorta 4.51 cm    STJ 3.70 cm    IVS 0.70 0.6 - 1.1 cm    LA size 6.44 cm    Left Atrium Major Axis 6.68 cm    Left Atrium Minor Axis 6.10 cm    LVIDd 4.50 3.5 - 6.0 cm    LVIDs 2.76 2.1 - 4.0 cm    LVOT diameter 1.96 cm    Posterior Wall 1.03 0.6 - 1.1 cm    RA Major Axis 6.14 cm    RA Width 5.65 cm    TAPSE 2.30 cm    TR Max Aguilar 3.50 m/s    TDI LATERAL 0.19 m/s    TDI SEPTAL 0.11 m/s    LA WIDTH 4.50 cm    Ao root annulus 3.63 cm    LV Diastolic Volume 92.42 mL    LV Systolic Volume 28.40 mL    Mr max aguilar 0.04 m/s    FS 39 %    LA volume 157.08 cm3    LV mass 125.97 g    Left Ventricle Relative Wall Thickness 0.46 cm    Mean e' 0.15 m/s    LVOT area 3.0 cm2    LV Systolic Volume Index 17.3 mL/m2    LV Diastolic Volume Index 56.42 mL/m2    LA Volume Index 95.9 mL/m2    LV Mass Index 77 g/m2    Triscuspid Valve Regurgitation Peak Gradient 49 mmHg    BSA 1.62 m2    Right Atrial Pressure (from IVC) 8 mmHg    TV rest pulmonary artery pressure 57 mmHg    Narrative    · The left ventricle is  normal in size with mildly decreased systolic   function. The estimated ejection fraction is 45%.  · Diastolic dysfunction.  · Low normal right ventricular systolic function.  · The estimated PA systolic pressure is 57 mmHg.  · Moderate mitral regurgitation.  · Mild aortic regurgitation.  · Mild to moderate tricuspid regurgitation.      , EKG: Reviewed and X-Ray: CXR: X-Ray Chest 1 View (CXR): No results found for this visit on 12/08/20. and X-Ray Chest PA and Lateral (CXR): No results found for this visit on 12/08/20.    Assessment and Plan:   Patient who presents with PNA/afib. HR variable, additional dose of IV digoxin today. Continue BB. HR will improve as PNA resolves. Follow-up in clinic.    * Pneumonia of left lower lobe due to infectious organism  -On IV ABX   -Mgmt per primary team    Anemia of chronic disease  -Monitor    Chronic atrial fibrillation  Remains in AFIB, variable rate control noted  Continue Eliquis for cardio-embolic protection  Continue Telemetry monitoring  Digoxin x 1 dose today to improve rate control  Continue BB therapy  Would like to transition to Toprol XL given reduced EF    12/10/2020  -HR variable overnight  -Continue Lopressor 100 mg BID  -Additional dose of IV digoxin today  -HR will improve once he recovers from PNA  -Continue Eliquis for AC    CKD (chronic kidney disease) stage 4, GFR 15-29 ml/min  -Monitor closely with daily labs        VTE Risk Mitigation (From admission, onward)         Ordered     IP VTE HIGH RISK PATIENT  Once      12/09/20 0026     Place sequential compression device  Until discontinued      12/09/20 0026     Reason for No Pharmacological VTE Prophylaxis  Once     Question:  Reasons:  Answer:  Already adequately anticoagulated on oral Anticoagulants    12/09/20 0026     apixaban tablet 2.5 mg  2 times daily      12/08/20 2221                Ana Dale PA-C  Cardiology  [facility] -

## 2020-12-10 NOTE — PROGRESS NOTES
12/10/20 - Secure chat message sent to Ileana Coronel IPCM RN, today requesting referral for this OPCM eligible pt. Cara Mcintosh RN

## 2020-12-10 NOTE — ASSESSMENT & PLAN NOTE
Creatine stable for now. BMP reviewed- noted Estimated Creatinine Clearance: 25.8 mL/min (A) (based on SCr of 1.8 mg/dL (H)). according to latest data. Monitor UOP and serial BMP and adjust therapy as needed. Renally dose meds.    Improving, Cr down to 1.8

## 2020-12-10 NOTE — ASSESSMENT & PLAN NOTE
Patient's anemia is currently controlled. Has not received any PRBCs to date.. Etiology likely d/t iron def. Check iron studies  Current CBC reviewed-   Lab Results   Component Value Date    HGB 9.2 (L) 12/09/2020    HCT 31.0 (L) 12/09/2020     Monitor serial CBC and transfuse if patient becomes hemodynamically unstable, symptomatic or H/H drops below 7/21.     Plus ANDREINA- given IV iron

## 2020-12-10 NOTE — ASSESSMENT & PLAN NOTE
Noted on chest x-ray.  He was initiated on IV Zosyn.  Monitor oxygen saturation.    Getting Zosyn- remains afebrile and no leukocytosis

## 2020-12-10 NOTE — ASSESSMENT & PLAN NOTE
Patient with Permanent atrial fibrillation which is controlled currently with Beta Blocker. Patient is currently in atrial fibrillation. [id number] score 5. Anticoagulation indicated. Anticoagulation done with OAC.    AFib with RVR- given IV Dig

## 2020-12-10 NOTE — NURSING
Peripheral IV removed with catheter intact. AVS given to caregiver and reviewed.     Per Dr. Knowles patient okay to keep maharaj catheter. Patient self caths at home, followed by urology.

## 2020-12-10 NOTE — HOSPITAL COURSE
80 year old male, hx of AFIB on Eliquis, Chronic systolic CHF, HFrEF of 45%, CKD stage III, CAD, Venous stasis dermatitis, BPH, Urinary retention who presented to [facility]- due to fever and chills and sepsis. CXR showed left basilar infiltrate and atelectatic lung changes. UA consistent with UTI and was subsequently started on IV Zosyn. Remains in AFIB/variable rate control today. No CP or SOB LUNA or palpitations. No leg swelling or claudications. Cards gave a dose of digoxin today to improve rate control and assess response in AM. Pt wants to go home, persuaded him to stay another nite.   12/10- sitting up in chair, feels well and comfortable, he and his ex wife ( caregiver) want to go home today. He did well with PT/OT. His Cr is down to 1.8 and his wife states that it was due to his Lasix which she had already stopped. I d/jennifer his Losartan and lowered his Lasix to 20 mg QOD prn for leg swelling. He will continue his Lopressor and Eliquis for his Afib and BP, they understand and accept. He is eating drinking well. All his Cx remain NGTD. He does not have any pneumonia but had UTI for which he has been treated with IV Abx and has remained afebrile without any Leukocytosis. He was seen and examined and deemed stable for discharge home today. They declined any HH.

## 2020-12-10 NOTE — PLAN OF CARE
Plan of care adequately met for discharge home today.   Problem: Adult Inpatient Plan of Care  Goal: Plan of Care Review  Outcome: Met

## 2020-12-10 NOTE — PROGRESS NOTES
[facility] Hospital Medicine  Progress Note    Patient Name: Yan Pickering Jr.  MRN: [id number]  Patient Class: IP- Inpatient   Admission Date: 12/8/2020  Length of Stay: 0 days  Attending Physician: Jose A Knowles MD  Primary Care Provider: Brenden Issa MD        Subjective:     Principal Problem:Pneumonia of left lower lobe due to infectious organism        HPI:  Yan Pickering Jr. is a 80 y.o. male patient with a PMH of A-fib  On oral anticoagulation, diastolic heart failure, chronic kidney disease stage 3, coronary artery disease and venous stasis dermatitis who presents to the ED for evaluation of fever and chills since yesterday.  He was evaluated by Dr. Issa  Who recommended ED evaluation for possible sepsis.   He is followed by Urology for BPH and urinary retention.  He had a indwelling catheter placed however recently he was transition to self catheterization which he has been compliant.    Patient reports intermittent hematuria with catheterization.  In the ED, CXR demonstrated Interval development of left basilar infiltrate and atelectatic change with small bilateral effusions suggested.  Chronic lung changes. He was found to have red blood cells and leukocytes in  urinalysis consistent with UTI.   He was initiated on IV Zosyn in the ED.   He is without complaints of dysuria or frequency.  Denies chest pain, shortness of breath, or nausea.    Overview/Hospital Course:  80 year old male, hx of AFIB on Eliquis, Chronic systolic CHF, HFrEF of 45%, CKD stage III, CAD, Venous stasis dermatitis, BPH, Urinary retention who presented to [facility] due to fever and chills and sepsis. CXR showed left basilar infiltrate and atelectatic lung changes. UA consistent with UTI and was subsequently started on IV Zosyn. Remains in AFIB/variable rate control today. No CP or SOB LUNA or palpitations. No leg swelling or claudications. Cards gave a dose of digoxin today to improve rate control and assess  response in AM. Pt wants to go home, persuaded him to stay another nite.     Interval History: Remains in AFIB/variable rate control today. No CP or SOB LUNA or palpitations. No leg swelling or claudications. Cards gave a dose of digoxin today to improve rate control and assess response in AM. Pt wants to go home, persuaded him to stay another nite.     Review of Systems   Constitutional: Positive for fatigue. Negative for appetite change, chills and diaphoresis.   HENT: Negative for congestion, hearing loss and sore throat.    Eyes: Negative for pain and itching.   Respiratory: Negative for cough, shortness of breath (LUNA) and wheezing.    Cardiovascular: Positive for leg swelling. Negative for chest pain and palpitations.        Atrial fibrillation    Gastrointestinal: Negative for abdominal pain, blood in stool and nausea.   Endocrine: Negative for polyphagia and polyuria.   Genitourinary: Negative for dysuria, flank pain and hematuria.   Musculoskeletal: Positive for arthralgias and back pain. Negative for myalgias.   Skin: Negative for color change and pallor.        bruising   Neurological: Negative for dizziness, syncope and light-headedness.   Hematological: Negative for adenopathy.   Psychiatric/Behavioral: Negative for agitation and confusion. The patient is not nervous/anxious.      Objective:     Vital Signs (Most Recent):  Temp: 97.9 °F (36.6 °C) (12/09/20 1513)  Pulse: 101 (12/09/20 1702)  Resp: 18 (12/09/20 1513)  BP: (!) 100/54 (12/09/20 1513)  SpO2: 97 % (12/09/20 1513) Vital Signs (24h Range):  Temp:  [97.9 °F (36.6 °C)-98.4 °F (36.9 °C)] 97.9 °F (36.6 °C)  Pulse:  [] 101  Resp:  [16-20] 18  SpO2:  [94 %-98 %] 97 %  BP: ()/(54-69) 100/54     Weight: 57.7 kg (127 lb 3.3 oz)  Body mass index is 20.53 kg/m².    Intake/Output Summary (Last 24 hours) at 12/9/2020 2006  Last data filed at 12/9/2020 1601  Gross per 24 hour   Intake 928 ml   Output 600 ml   Net 328 ml      Physical Exam  Vitals  signs and nursing note reviewed.   Constitutional:       Appearance: Normal appearance. He is well-developed.   HENT:      Head: Normocephalic and atraumatic.      Right Ear: External ear normal.      Left Ear: External ear normal.      Nose: Nose normal.      Mouth/Throat:      Mouth: Mucous membranes are moist.   Eyes:      Conjunctiva/sclera: Conjunctivae normal.      Pupils: Pupils are equal, round, and reactive to light.   Neck:      Musculoskeletal: Normal range of motion and neck supple.   Cardiovascular:      Rate and Rhythm: Normal rate. Rhythm irregular.      Heart sounds: Murmur present.   Pulmonary:      Effort: Pulmonary effort is normal.      Breath sounds: Normal breath sounds. No wheezing.      Comments: Decreased at base  Abdominal:      General: Bowel sounds are normal.      Palpations: Abdomen is soft.      Tenderness: There is no abdominal tenderness.   Genitourinary:     Rectum: Guaiac result negative.   Musculoskeletal: Normal range of motion.   Skin:     General: Skin is warm and dry.      Findings: Bruising (bilateral upper ext) present.      Comments: Small scab to left mid shin, chronic skin changes   Neurological:      General: No focal deficit present.      Mental Status: He is alert. Mental status is at baseline.      Motor: No weakness.      Comments: Oriented x 2   Psychiatric:         Mood and Affect: Mood normal.         Behavior: Behavior normal.         Significant Labs:   BMP:   Recent Labs   Lab 12/09/20  0625   GLU 85      K 4.2      CO2 22*   BUN 35*   CREATININE 1.8*   CALCIUM 8.0*     CBC:   Recent Labs   Lab 12/08/20  1540 12/09/20  0625   WBC 9.50 10.93   HGB 9.6* 9.2*   HCT 30.6* 31.0*    154     All pertinent labs within the past 24 hours have been reviewed.    Significant Imaging: I have reviewed all pertinent imaging results/findings within the past 24 hours.      Assessment/Plan:      * Pneumonia of left lower lobe due to infectious organism   Noted  on chest x-ray.  He was initiated on IV Zosyn.  Monitor oxygen saturation.    Getting Zosyn- remains afebrile and no leukocytosis      Anemia of chronic disease  Patient's anemia is currently controlled. Has not received any PRBCs to date.. Etiology likely d/t iron def. Check iron studies  Current CBC reviewed-   Lab Results   Component Value Date    HGB 9.2 (L) 12/09/2020    HCT 31.0 (L) 12/09/2020     Monitor serial CBC and transfuse if patient becomes hemodynamically unstable, symptomatic or H/H drops below 7/21.     Plus ANDREINA- given IV iron        Chronic atrial fibrillation  Patient with Permanent atrial fibrillation which is controlled currently with Beta Blocker. Patient is currently in atrial fibrillation. [id number] score 5. Anticoagulation indicated. Anticoagulation done with OAC.    AFib with RVR- given IV Dig        CKD (chronic kidney disease) stage 4, GFR 15-29 ml/min  Creatine stable for now. BMP reviewed- noted Estimated Creatinine Clearance: 25.8 mL/min (A) (based on SCr of 1.8 mg/dL (H)). according to latest data. Monitor UOP and serial BMP and adjust therapy as needed. Renally dose meds.    Improving, Cr down to 1.8          Venous stasis dermatitis of both lower extremities    Chronic.   Monitor skin.  Patient with healing scabs to left lower extremity      Benign prostatic hyperplasia with incomplete bladder emptying    In and out catheterizations.        VTE Risk Mitigation (From admission, onward)         Ordered     IP VTE HIGH RISK PATIENT  Once      12/09/20 0026     Place sequential compression device  Until discontinued      12/09/20 0026     Reason for No Pharmacological VTE Prophylaxis  Once     Question:  Reasons:  Answer:  Already adequately anticoagulated on oral Anticoagulants    12/09/20 0026     apixaban tablet 2.5 mg  2 times daily      12/08/20 2221                Discharge Planning   TACOS:      Code Status: Full Code   Is the patient medically ready for discharge?:     Reason for  patient still in hospital (select all that apply): Patient trending condition, Laboratory test, Treatment and Consult recommendations  Discharge Plan A: Home with family                  Jose A Knowles MD  Department of Hospital Medicine   [facility]

## 2020-12-10 NOTE — PT/OT/SLP EVAL
Physical Therapy Evaluation    Patient Name:  Yan Pickering Jr.   MRN:  [id number]    Recommendations:     Discharge Recommendations:  nursing facility, skilled, home health PT(DEPENDING ON PROGRESS WITH POC)   Discharge Equipment Recommendations: walker, rolling   Barriers to discharge: None    Assessment:     Yan Pickering Jr. is a 80 y.o. male admitted with a medical diagnosis of Pneumonia of left lower lobe due to infectious organism.  He presents with the following impairments/functional limitations:  weakness, impaired endurance, impaired balance, gait instability, impaired functional mobilty, decreased coordination.    Rehab Prognosis: Good; patient would benefit from acute skilled PT services to address these deficits and reach maximum level of function.    Recent Surgery: * No surgery found *      Plan:     During this hospitalization, patient to be seen 5 x/week to address the identified rehab impairments via gait training, therapeutic activities, therapeutic exercises and progress toward the following goals:    · Plan of Care Expires:  12/17/20    Subjective     Chief Complaint: WEAKNESS  Patient/Family Comments/goals:   Pain/Comfort:  · Pain Rating 1: 0/10    Patients cultural, spiritual, Mandaen conflicts given the current situation:      Living Environment:  PT LIVES WITH X-WIFE WHO IS CAREGIVER FOR HIM AND HER SISTER, LIVES IN MOBILE HOME 5 STEPS TO ENTER BUT RAMP BEING BUILT RIGHT NOW, AMB WITH ROLLATOR WALKER IN HOME, NO AD IN COMMUNITY PER X-WIFE, PT DOES NOT DRIVE, INDEP WITH ADL'S, ACCURATE HISTORY HARD TO OBTAIN DUE TO PT AND X-WIFE CONSTANTLY TALKING OVER EACH OTHER  Prior to admission, patients level of function was.  Equipment used at home: rollator, shower chair, hospital bed.  DME owned (not currently used): none.  Upon discharge, patient will have assistance from X-WIFE.    Objective:     Communicated with NURSE RABAGO prior to session.  Patient found supine with telemetry, peripheral  IV, maharaj catheter  upon PT entry to room.    General Precautions: Standard, fall   Orthopedic Precautions:N/A   Braces: N/A     Exams:  · Cognitive Exam:  Patient is oriented to Person, Place, Time and Situation  · Postural Exam:  Patient presented with the following abnormalities:    · -       Rounded shoulders  · -       Forward head  · -       Kyphosis  · Sensation:    · -       Intact  · RLE ROM: WFL  · RLE Strength: GROSSLY 3+/5  · LLE ROM: WFL  · LLE Strength: GROSSLY 3+/5    Functional Mobility:  · Bed Mobility:     · Rolling Left:  maximal assistance  · Scooting: maximal assistance  · Supine to Sit: maximal assistance and of 2 persons  · Transfers:     · Sit to Stand:  minimum assistance with rolling walker  · Bed to Chair: minimum assistance with  rolling walker  using  Step Transfer  · Gait: PT AMB 20' IN ROOM WITH RW AND COURTNEY, SLOW PACE, PT BENT FWD AT BASELINE, QUICK TO FATIGUE  · Balance: POOR+    Therapeutic Activities and Exercises:   PT EDUCATED IN ROLE OF P.T. AND POC, PT EDUCATED IN LOG ROLLING FOR BED MOBILITY, PT ENCOURAGED TO INCREASE TIME OOB IN CHAIR    AM-PAC 6 CLICK MOBILITY  Total Score:14     Patient left up in chair with all lines intact, call button in reach, chair alarm on, NURSE notified and X-WIFE present.    GOALS:   Multidisciplinary Problems     Physical Therapy Goals        Problem: Physical Therapy Goal    Goal Priority Disciplines Outcome Goal Variances Interventions   Physical Therapy Goal     PT, PT/OT      Description: LTG'S TO BE MET IN 7 DAYS (12-17-20)  1. PT WILL REQUIRE MODA FOR BED MOBILITY  2. PT WILL REQUIRE CGA FOR TF'S  3. PT WILL ' WITH RW AND CGA                     History:     Past Medical History:   Diagnosis Date    Atrial fibrillation     Cardiomyopathy 4/26/2017    CHF (congestive heart failure)     CKD (chronic kidney disease) stage 3, GFR 30-59 ml/min     Mild mitral insufficiency     Moderate aortic insufficiency     Venous stasis  dermatitis of both lower extremities        History reviewed. No pertinent surgical history.    Time Tracking:     PT Received On: 12/10/20  PT Start Time: 0840     PT Stop Time: 0905  PT Total Time (min): 25 min     Billable Minutes: Evaluation 15 and Gait Training 10    Meggan Liu, PT  12/10/2020

## 2020-12-10 NOTE — SUBJECTIVE & OBJECTIVE
Review of Systems   Constitution: Positive for malaise/fatigue.   HENT: Negative.    Eyes: Negative.    Cardiovascular: Positive for dyspnea on exertion (improved).   Respiratory: Negative.    Endocrine: Negative.    Hematologic/Lymphatic: Negative.    Skin: Negative.    Musculoskeletal: Positive for arthritis and joint pain.   Gastrointestinal: Negative.    Genitourinary: Negative.    Neurological: Negative.    Psychiatric/Behavioral: Negative.    Allergic/Immunologic: Negative.      Objective:     Vital Signs (Most Recent):  Temp: 97.6 °F (36.4 °C) (12/10/20 0746)  Pulse: (!) 116 (12/10/20 0900)  Resp: 18 (12/10/20 0746)  BP: 111/61 (12/10/20 0746)  SpO2: 95 % (12/10/20 0746) Vital Signs (24h Range):  Temp:  [97.6 °F (36.4 °C)-99.9 °F (37.7 °C)] 97.6 °F (36.4 °C)  Pulse:  [] 116  Resp:  [18] 18  SpO2:  [92 %-97 %] 95 %  BP: (100-114)/(53-61) 111/61     Weight: 57.7 kg (127 lb 3.3 oz)  Body mass index is 20.53 kg/m².     SpO2: 95 %  O2 Device (Oxygen Therapy): room air      Intake/Output Summary (Last 24 hours) at 12/10/2020 1220  Last data filed at 12/10/2020 0900  Gross per 24 hour   Intake 100 ml   Output 1675 ml   Net -1575 ml       Lines/Drains/Airways     Drain                 Urethral Catheter 12/09/20 0527 Latex 16 Fr. 1 day          Peripheral Intravenous Line                 Peripheral IV - Single Lumen 12/08/20 1540 20 G Right Antecubital 1 day                Physical Exam   Constitutional: He is oriented to person, place, and time. He appears well-developed and well-nourished. No distress.   HENT:   Head: Normocephalic and atraumatic.   Eyes: Pupils are equal, round, and reactive to light. Right eye exhibits no discharge. Left eye exhibits no discharge.   Neck: Neck supple. No JVD present.   Cardiovascular: S1 normal, S2 normal and normal heart sounds. An irregularly irregular rhythm present. Tachycardia present.   No murmur heard.  Pulmonary/Chest: Effort normal and breath sounds normal. No  respiratory distress. He has no wheezes. He has no rales.   Abdominal: Soft. He exhibits no distension.   Musculoskeletal:         General: No edema.   Neurological: He is alert and oriented to person, place, and time.   Skin: Skin is warm and dry. He is not diaphoretic. No erythema.   CVI changes BLE     Psychiatric: He has a normal mood and affect. His behavior is normal. Thought content normal.   Nursing note and vitals reviewed.      Significant Labs:   CMP   Recent Labs   Lab 12/08/20  1540 12/09/20  0625 12/10/20  0641    137 136   K 4.9 4.2 4.3    108 107   CO2 22* 22* 25   * 85 88   BUN 36* 35* 28*   CREATININE 2.3* 1.8* 1.8*   CALCIUM 8.4* 8.0* 7.8*   PROT 6.7  --   --    ALBUMIN 2.8*  --   --    BILITOT 0.9  --   --    ALKPHOS 106  --   --    AST 12  --   --    ALT 12  --   --    ANIONGAP 12 7* 4*   ESTGFRAFRICA 29* 40* 40*   EGFRNONAA 25* 34* 34*   , CBC   Recent Labs   Lab 12/08/20  1540 12/09/20  0625 12/10/20  0641   WBC 9.50 10.93 9.09   HGB 9.6* 9.2* 8.6*   HCT 30.6* 31.0* 28.8*    154 157   , Troponin No results for input(s): TROPONINI in the last 48 hours. and All pertinent lab results from the last 24 hours have been reviewed.    Significant Imaging: Echocardiogram:   Transthoracic echo (TTE) complete (Cupid Only):   Results for orders placed or performed during the hospital encounter of 10/15/20   Echo Color Flow Doppler? Yes   Result Value Ref Range    Ascending aorta 4.51 cm    STJ 3.70 cm    IVS 0.70 0.6 - 1.1 cm    LA size 6.44 cm    Left Atrium Major Axis 6.68 cm    Left Atrium Minor Axis 6.10 cm    LVIDd 4.50 3.5 - 6.0 cm    LVIDs 2.76 2.1 - 4.0 cm    LVOT diameter 1.96 cm    Posterior Wall 1.03 0.6 - 1.1 cm    RA Major Axis 6.14 cm    RA Width 5.65 cm    TAPSE 2.30 cm    TR Max Aguilar 3.50 m/s    TDI LATERAL 0.19 m/s    TDI SEPTAL 0.11 m/s    LA WIDTH 4.50 cm    Ao root annulus 3.63 cm    LV Diastolic Volume 92.42 mL    LV Systolic Volume 28.40 mL    Mr max aguilar 0.04  m/s    FS 39 %    LA volume 157.08 cm3    LV mass 125.97 g    Left Ventricle Relative Wall Thickness 0.46 cm    Mean e' 0.15 m/s    LVOT area 3.0 cm2    LV Systolic Volume Index 17.3 mL/m2    LV Diastolic Volume Index 56.42 mL/m2    LA Volume Index 95.9 mL/m2    LV Mass Index 77 g/m2    Triscuspid Valve Regurgitation Peak Gradient 49 mmHg    BSA 1.62 m2    Right Atrial Pressure (from IVC) 8 mmHg    TV rest pulmonary artery pressure 57 mmHg    Narrative    · The left ventricle is normal in size with mildly decreased systolic   function. The estimated ejection fraction is 45%.  · Diastolic dysfunction.  · Low normal right ventricular systolic function.  · The estimated PA systolic pressure is 57 mmHg.  · Moderate mitral regurgitation.  · Mild aortic regurgitation.  · Mild to moderate tricuspid regurgitation.      , EKG: Reviewed and X-Ray: CXR: X-Ray Chest 1 View (CXR): No results found for this visit on 12/08/20. and X-Ray Chest PA and Lateral (CXR): No results found for this visit on 12/08/20.

## 2020-12-11 ENCOUNTER — TELEPHONE (OUTPATIENT)
Dept: INTERNAL MEDICINE | Facility: CLINIC | Age: 80
End: 2020-12-11

## 2020-12-11 ENCOUNTER — TELEPHONE (OUTPATIENT)
Dept: UROLOGY | Facility: CLINIC | Age: 80
End: 2020-12-11

## 2020-12-11 NOTE — TELEPHONE ENCOUNTER
----- Message from Shavon Seaman sent at 12/11/2020 10:11 AM CST -----  Contact: pt care giver  .Type:  Needs Medical Advice    Who Called:   pt care giver  Symptoms (please be specific):  How long has patient had these symptoms:  Pharmacy name and phone #:    Would the patient rather a call back or a response via my [facility]?  Call    Best Call Back Number: [phone] (home)    Additional Information: Caller is requesting a call back from the nurse in regards to the pt stopping lorstain per MS bina and he has stopped his LASIX per the hospital the dr that he saw in the hospital was Dr broderick and gabriel 12/10/2020 was the date that the pt was in the hospital and er please the hospital notes for the pt diagnose please

## 2020-12-11 NOTE — TELEPHONE ENCOUNTER
Pt was in the hospital for two days and has an permeant cath. Pt stopping lorstain and he has stopped his LASIX per the dr that he saw in the hospital was Dr broderick and gabriel 12/10/2020 was the date that the pt was in the hospital see hospital notes for the pt diagnose. Per Ms. Diop.

## 2020-12-11 NOTE — TELEPHONE ENCOUNTER
----- Message from Shavon Seaman sent at 12/11/2020 10:11 AM CST -----  Contact: pt care giver  .Type:  Needs Medical Advice    Who Called:   pt care giver  Symptoms (please be specific):  How long has patient had these symptoms:  Pharmacy name and phone #:    Would the patient rather a call back or a response via my [facility]?  Call    Best Call Back Number: [phone] (home)    Additional Information: Caller is requesting a call back from the nurse in regards to the pt stopping lorstain per MS bina and he has stopped his LASIX per the hospital the dr that he saw in the hospital was Dr broderick and gabriel 12/10/2020 was the date that the pt was in the hospital and er please the hospital notes for the pt diagnose please

## 2020-12-11 NOTE — TELEPHONE ENCOUNTER
Pt care giver wanted to update Dr. Pritchett on pt has an indwelling maharaj and will follow up as scheduled.

## 2020-12-12 ENCOUNTER — TELEPHONE (OUTPATIENT)
Dept: HEPATOLOGY | Facility: HOSPITAL | Age: 80
End: 2020-12-12

## 2020-12-12 LAB
BACTERIA UR CULT: ABNORMAL
BACTERIA UR CULT: ABNORMAL

## 2020-12-12 RX ORDER — AMOXICILLIN AND CLAVULANATE POTASSIUM 875; 125 MG/1; MG/1
1 TABLET, FILM COATED ORAL 2 TIMES DAILY
Qty: 20 TABLET | Refills: 0 | Status: SHIPPED | OUTPATIENT
Start: 2020-12-12 | End: 2020-12-22 | Stop reason: ALTCHOICE

## 2020-12-12 NOTE — TELEPHONE ENCOUNTER
Called and spoke with the patient today, Notified the patient of positive urine cultures. The patient was notified that a prescription for Augmentin was sent to the [facility] pharmacy in [place]. The patient reported that he would  the prescription and start taking the medication immediately.

## 2020-12-12 NOTE — DISCHARGE SUMMARY
[facility]  Hospital Medicine  Discharge Summary      Patient Name: Yan Pickering Jr.  MRN: [id number]  Admission Date: 12/8/2020  Hospital Length of Stay: 2 days  Discharge Date and Time:  12/11/2020 11:35 PM  Attending Physician: No att. providers found   Discharging Provider: Jose A Knowles MD  Primary Care Provider: Brenden Issa MD      HPI:   Yan Pickering Jr. is a 80 y.o. male patient with a PMH of A-fib  On oral anticoagulation, diastolic heart failure, chronic kidney disease stage 3, coronary artery disease and venous stasis dermatitis who presents to the ED for evaluation of fever and chills since yesterday.  He was evaluated by Dr. Issa  Who recommended ED evaluation for possible sepsis.   He is followed by Urology for BPH and urinary retention.  He had a indwelling catheter placed however recently he was transition to self catheterization which he has been compliant.    Patient reports intermittent hematuria with catheterization.  In the ED, CXR demonstrated Interval development of left basilar infiltrate and atelectatic change with small bilateral effusions suggested.  Chronic lung changes. He was found to have red blood cells and leukocytes in  urinalysis consistent with UTI.   He was initiated on IV Zosyn in the ED.   He is without complaints of dysuria or frequency.  Denies chest pain, shortness of breath, or nausea.    * No surgery found *      Hospital Course:   80 year old male, hx of AFIB on Eliquis, Chronic systolic CHF, HFrEF of 45%, CKD stage III, CAD, Venous stasis dermatitis, BPH, Urinary retention who presented to [facility] due to fever and chills and sepsis. CXR showed left basilar infiltrate and atelectatic lung changes. UA consistent with UTI and was subsequently started on IV Zosyn. Remains in AFIB/variable rate control today. No CP or SOB LUNA or palpitations. No leg swelling or claudications. Cards gave a dose of digoxin today to improve rate control and assess response  in AM. Pt wants to go home, persuaded him to stay another nite.   12/10- sitting up in chair, feels well and comfortable, he and his ex wife ( caregiver) want to go home today. He did well with PT/OT. His Cr is down to 1.8 and his wife states that it was due to his Lasix which she had already stopped. I d/jennifer his Losartan and lowered his Lasix to 20 mg QOD prn for leg swelling. He will continue his Lopressor and Eliquis for his Afib and BP, they understand and accept. He is eating drinking well. All his Cx remain NGTD. He does not have any pneumonia but had UTI for which he has been treated with IV Abx and has remained afebrile without any Leukocytosis. He was seen and examined and deemed stable for discharge home today. They declined any HH.       Consults:   Consults (From admission, onward)        Status Ordering Provider     Inpatient consult to Cardiology  Once     Provider:  Alfredo Lazaro MD    Completed BRIDGER ARGUETA          No new Assessment & Plan notes have been filed under this hospital service since the last note was generated.  Service: Hospital Medicine    Final Active Diagnoses:    Diagnosis Date Noted POA    Anemia of chronic disease [D63.8] 01/23/2020 Yes    Chronic atrial fibrillation [I48.20] 03/28/2017 Yes    CKD (chronic kidney disease) stage 4, GFR 15-29 ml/min [N18.4]  Yes    Venous stasis dermatitis of both lower extremities [I87.2]  Yes    Benign prostatic hyperplasia with incomplete bladder emptying [N40.1, R39.14] 12/08/2020 Yes      Problems Resolved During this Admission:    Diagnosis Date Noted Date Resolved POA    PRINCIPAL PROBLEM:  Acute cystitis without hematuria [N30.00] 12/10/2020 12/10/2020 Yes    Pneumonia of left lower lobe due to infectious organism [J18.9] 12/08/2020 12/10/2020 Yes       Discharged Condition: stable    Disposition: Home or Self Care    Follow Up:  Follow-up Information     Brenden Issa MD. Schedule an appointment as soon as possible for a visit in  3 days.    Specialty: Family Medicine  Why: Hospital follow up  Contact information:  [id number] [facility] [address] [phone]                 Patient Instructions:      Diet Cardiac     Activity as tolerated       Significant Diagnostic Studies: Labs:   BMP:   Recent Labs   Lab 12/10/20  0641   GLU 88      K 4.3      CO2 25   BUN 28*   CREATININE 1.8*   CALCIUM 7.8*   , CMP   Recent Labs   Lab 12/10/20  0641      K 4.3      CO2 25   GLU 88   BUN 28*   CREATININE 1.8*   CALCIUM 7.8*   ANIONGAP 4*   ESTGFRAFRICA 40*   EGFRNONAA 34*   , CBC   Recent Labs   Lab 12/10/20  0641   WBC 9.09   HGB 8.6*   HCT 28.8*       and All labs within the past 24 hours have been reviewed  Microbiology:   Blood Culture   Lab Results   Component Value Date    LABBLOO No growth to date 12/08/2020    LABBLOO No Growth to date 12/08/2020    and Urine Culture    Lab Results   Component Value Date    LABURIN (A) 12/08/2020     STAPHYLOCOCCUS AUREUS  >100,000cfu/ml  Susceptibility pending       Radiology:  Imaging Results          X-Ray Chest AP Portable (Final result)  Result time 12/08/20 14:20:50    Final result by Samson Cope III, MD (12/08/20 14:20:50)                 Impression:      Interval development of left basilar infiltrate and atelectatic change with small bilateral effusions suggested.  Chronic lung changes.  Follow-up recommended      Electronically signed by: Samson Cope MD  Date:    12/08/2020  Time:    14:20             Narrative:    EXAMINATION:  XR CHEST AP PORTABLE    CLINICAL HISTORY:  Sepsis;    COMPARISON:  October    FINDINGS:  The heart size is borderline to mildly enlarged.  The patient's head and neck obscure the lung apices.  Interval development of blunting of both costophrenic angles with a band of infiltrate and/or atelectatic change in the retrocardiac region at the left base.  Right upper lung field infiltrate has cleared.  Chronic lung  changes.                                Cardiac Graphics: Echocardiogram:   2D echo with color flow doppler:   Results for orders placed or performed during the hospital encounter of 01/23/20   2D echo with color flow doppler   Result Value Ref Range    QEF 45 55 - 65    Mitral Valve Regurgitation MODERATE (A)     Diastolic Dysfunction Yes (A)     Aortic Valve Regurgitation MILD     Est. PA Systolic Pressure 54 (A)     Pericardial Effusion TRIVIAL     Tricuspid Valve Regurgitation MILD     Narrative    Date of Procedure: 01/24/2020        TEST DESCRIPTION   Technical Quality: This is a technically challenging study. There is poor endocardial definition.     Aorta: The aortic root is mildly enlarged, measuring 3.8 cm at sinotubular junction and 4.0 cm at Sinuses of Valsalva. The proximal ascending aorta is normal in size, measuring 3.3 cm across.     Left Atrium: The left atrial volume index is severely enlarged, measuring 51.22 cc/m2.     Left Ventricle: The left ventricle is normal in size, with an end-diastolic diameter of 4.9 cm, and an end-systolic diameter of 3.5 cm. Posterior wall thickness is increased, with the septum measuring 1.1 cm and the posterior wall measuring 1.5 cm   across. Relative wall thickness was increased at 0.61, and the LV mass index was increased at 179.2 g/m2 consistent with concentric left ventricular hypertrophy. There are no regional wall motion abnormalities. Left ventricular systolic function appears   mildly depressed. Visually estimated ejection fraction is 45%. The LV Doppler derived stroke volume equals 72.0 ccs.     Diastolic indices: E wave velocity 0.9 m/s, E/A ratio 1.6,  msec., E/e' ratio(avg) 10. There is pseudonormalization of mitral inflow pattern consistent with significant diastolic dysfunction.     Right Atrium: The right atrium is mildly enlarged, measuring 5.4 cm in length and 4.1 cm in width in the apical view.     Right Ventricle: The right ventricle is  normal in size. Global right ventricular systolic function appears normal. Tricuspid annular plane systolic excursion (TAPSE) is 1.9 cm. The estimated PA systolic pressure is 54 mmHg.     Aortic Valve:  The aortic valve is mildly sclerotic. The mean gradient obtained across the aortic valve is 6 mmHg. Additionally, there is mild aortic regurgitation. There is a pressure half time of 513.0 msec.     Mitral Valve:  The mitral valve is mildly sclerotic. The pressure half time is 45 msec. The calculated mitral valve area is 4.89 cm2. There is mild to moderate mitral regurgitation.     Tricuspid Valve:  The tricuspid valve is normal in structure. There is mild tricuspid regurgitation.     Pulmonary Valve:  The pulmonic valve is not well seen.     Pericardium: There is evidence of a trivial pericardial effusion.     IVC: IVC is enlarged and collapses < 50% with a sniff, suggesting high right atrial pressure of 15 mmHg.     Intracavitary: There is no evidence of intracavity mass, thrombi, or vegetation.         CONCLUSIONS     1 - Mildly depressed left ventricular systolic function (EF 45%).     2 - Impaired LV relaxation, elevated LAP (grade 2 diastolic dysfunction).     3 - Normal right ventricular systolic function .     4 - Mild aortic regurgitation.     5 - Mild to moderate mitral regurgitation.     6 - Pulmonary hypertension. The estimated PA systolic pressure is 54 mmHg.     7 - Biatrial enlargement.             This document has been electronically    SIGNED BY: Kash Herrera MD On: 01/24/2020 13:08       Pending Diagnostic Studies:     None         Medications:  Reconciled Home Medications:      Medication List      CHANGE how you take these medications    furosemide 20 MG tablet  Commonly known as: LASIX  Every MWF prn for leg swelling, SOB  What changed:   · medication strength  · additional instructions        CONTINUE taking these medications    apixaban 2.5 mg Tab  Commonly known as: ELIQUIS  Take 1 tablet  (2.5 mg total) by mouth 2 (two) times daily.     metoprolol tartrate 100 MG tablet  Commonly known as: LOPRESSOR  Take 1 tablet (100 mg total) by mouth 2 (two) times daily.        STOP taking these medications    losartan 100 MG tablet  Commonly known as: COZAAR            Indwelling Lines/Drains at time of discharge:   Lines/Drains/Airways     Drain                 Urethral Catheter 12/09/20 0527 Latex 16 Fr. 2 days                Time spent on the discharge of patient: 47 minutes  Patient was seen and examined on the date of discharge and determined to be suitable for discharge.         Jose A Knowles MD  Department of Hospital Medicine  [facility]

## 2020-12-13 ENCOUNTER — TELEPHONE (OUTPATIENT)
Dept: CARDIOLOGY | Facility: HOSPITAL | Age: 80
End: 2020-12-13

## 2020-12-13 LAB — BACTERIA BLD CULT: NORMAL

## 2020-12-14 LAB — BACTERIA BLD CULT: NORMAL

## 2020-12-22 ENCOUNTER — OFFICE VISIT (OUTPATIENT)
Dept: CARDIOLOGY | Facility: CLINIC | Age: 80
End: 2020-12-22
Payer: MEDICARE

## 2020-12-22 VITALS — OXYGEN SATURATION: 98 % | HEART RATE: 90 BPM | DIASTOLIC BLOOD PRESSURE: 82 MMHG | SYSTOLIC BLOOD PRESSURE: 118 MMHG

## 2020-12-22 DIAGNOSIS — I48.20 CHRONIC ATRIAL FIBRILLATION: ICD-10-CM

## 2020-12-22 DIAGNOSIS — I50.22 CHRONIC SYSTOLIC CONGESTIVE HEART FAILURE: ICD-10-CM

## 2020-12-22 PROCEDURE — 3079F PR MOST RECENT DIASTOLIC BLOOD PRESSURE 80-89 MM HG: ICD-10-PCS | Mod: HCNC,CPTII,S$GLB, | Performed by: NURSE PRACTITIONER

## 2020-12-22 PROCEDURE — 1159F PR MEDICATION LIST DOCUMENTED IN MEDICAL RECORD: ICD-10-PCS | Mod: HCNC,S$GLB,, | Performed by: NURSE PRACTITIONER

## 2020-12-22 PROCEDURE — 1126F AMNT PAIN NOTED NONE PRSNT: CPT | Mod: HCNC,S$GLB,, | Performed by: NURSE PRACTITIONER

## 2020-12-22 PROCEDURE — 99499 UNLISTED E&M SERVICE: CPT | Mod: S$GLB,,, | Performed by: NURSE PRACTITIONER

## 2020-12-22 PROCEDURE — 99499 RISK ADDL DX/OHS AUDIT: ICD-10-PCS | Mod: S$GLB,,, | Performed by: NURSE PRACTITIONER

## 2020-12-22 PROCEDURE — 1159F MED LIST DOCD IN RCRD: CPT | Mod: HCNC,S$GLB,, | Performed by: NURSE PRACTITIONER

## 2020-12-22 PROCEDURE — 99214 OFFICE O/P EST MOD 30 MIN: CPT | Mod: HCNC,S$GLB,, | Performed by: NURSE PRACTITIONER

## 2020-12-22 PROCEDURE — 99214 PR OFFICE/OUTPT VISIT, EST, LEVL IV, 30-39 MIN: ICD-10-PCS | Mod: HCNC,S$GLB,, | Performed by: NURSE PRACTITIONER

## 2020-12-22 PROCEDURE — 3074F PR MOST RECENT SYSTOLIC BLOOD PRESSURE < 130 MM HG: ICD-10-PCS | Mod: HCNC,CPTII,S$GLB, | Performed by: NURSE PRACTITIONER

## 2020-12-22 PROCEDURE — 3074F SYST BP LT 130 MM HG: CPT | Mod: HCNC,CPTII,S$GLB, | Performed by: NURSE PRACTITIONER

## 2020-12-22 PROCEDURE — 1126F PR PAIN SEVERITY QUANTIFIED, NO PAIN PRESENT: ICD-10-PCS | Mod: HCNC,S$GLB,, | Performed by: NURSE PRACTITIONER

## 2020-12-22 PROCEDURE — 99999 PR PBB SHADOW E&M-EST. PATIENT-LVL II: CPT | Mod: PBBFAC,HCNC,, | Performed by: NURSE PRACTITIONER

## 2020-12-22 PROCEDURE — 3079F DIAST BP 80-89 MM HG: CPT | Mod: HCNC,CPTII,S$GLB, | Performed by: NURSE PRACTITIONER

## 2020-12-22 PROCEDURE — 99999 PR PBB SHADOW E&M-EST. PATIENT-LVL II: ICD-10-PCS | Mod: PBBFAC,HCNC,, | Performed by: NURSE PRACTITIONER

## 2020-12-22 RX ORDER — LOSARTAN POTASSIUM 25 MG/1
TABLET ORAL
COMMUNITY
Start: 2020-12-21 | End: 2020-12-22

## 2020-12-22 RX ORDER — METOPROLOL TARTRATE 100 MG/1
100 TABLET ORAL 2 TIMES DAILY
Qty: 60 TABLET | Refills: 11 | Status: SHIPPED | OUTPATIENT
Start: 2020-12-22 | End: 2021-12-22

## 2020-12-22 NOTE — PROGRESS NOTES
Subjective:   Patient ID:  Yan Pickering Jr. is a 80 y.o. male who presents for follow up of Atrial Fibrillation      HPI  80 year old male, hx of AFIB on Eliquis, Chronic systolic CHF with LVF 45%, CKD stage III, CAD, pulm HTN, severe kyphosis, moderate AI, Venous stasis dermatitis, BPH, Urinary retention     Admitted last week to [facility] with UTI. Treated with IV abx. Required 1 dose of IV Digoxin for RVR in the setting of infection. HR improved.   Noted to have PAMELA. Lasix changed to PRN and Losartan stopped.   Has no abnormal bleeding on Eliquis.   A-fib rate controlled with current dose of lopressor     Denies any chest pain, SOB, LUNA,  orthopnea, PND, dizziness, palpitations,  near syncope, syncope or edema . Has no symptoms concerning for angina or equivalent. No CNS Complaints to suggest TIA or CVA. Does well with limiting sodium intake.      Past Medical History:   Diagnosis Date    Atrial fibrillation     Cardiomyopathy 4/26/2017    CHF (congestive heart failure)     CKD (chronic kidney disease) stage 3, GFR 30-59 ml/min     Mild mitral insufficiency     Moderate aortic insufficiency     Venous stasis dermatitis of both lower extremities        History reviewed. No pertinent surgical history.    Social History     Tobacco Use    Smoking status: Never Smoker    Smokeless tobacco: Never Used   Substance Use Topics    Alcohol use: No    Drug use: No       Family History   Problem Relation Age of Onset    No Known Problems Mother        Current Outpatient Medications   Medication Sig    apixaban (ELIQUIS) 2.5 mg Tab Take 1 tablet (2.5 mg total) by mouth 2 (two) times daily.    metoprolol tartrate (LOPRESSOR) 100 MG tablet Take 1 tablet (100 mg total) by mouth 2 (two) times daily.     No current facility-administered medications for this visit.      Current Outpatient Medications on File Prior to Visit   Medication Sig    [DISCONTINUED] apixaban (ELIQUIS) 2.5 mg Tab Take 1 tablet (2.5 mg total) by  mouth 2 (two) times daily.    [DISCONTINUED] metoprolol tartrate (LOPRESSOR) 100 MG tablet Take 1 tablet (100 mg total) by mouth 2 (two) times daily.    [DISCONTINUED] amoxicillin-clavulanate 875-125mg (AUGMENTIN) 875-125 mg per tablet Take 1 tablet by mouth 2 (two) times daily. for 10 days (Patient not taking: Reported on 12/22/2020)    [DISCONTINUED] furosemide (LASIX) 20 MG tablet Every MWF prn for leg swelling, SOB (Patient not taking: Reported on 12/22/2020)    [DISCONTINUED] losartan (COZAAR) 25 MG tablet      No current facility-administered medications on file prior to visit.        Review of Systems   Constitution: Negative for diaphoresis, malaise/fatigue, weight gain and weight loss.   HENT: Negative for congestion and nosebleeds.    Cardiovascular: Negative for chest pain, claudication, cyanosis, dyspnea on exertion, irregular heartbeat, leg swelling, near-syncope, orthopnea, palpitations, paroxysmal nocturnal dyspnea and syncope.   Respiratory: Negative for cough, hemoptysis, shortness of breath, sleep disturbances due to breathing, snoring, sputum production and wheezing.    Hematologic/Lymphatic: Negative for bleeding problem. Does not bruise/bleed easily.   Skin: Negative for rash.   Musculoskeletal: Negative for arthritis, back pain, falls, joint pain, muscle cramps and muscle weakness.   Gastrointestinal: Negative for abdominal pain, constipation, diarrhea, heartburn, hematemesis, hematochezia, melena, nausea and vomiting.   Genitourinary: Negative for dysuria, hematuria and nocturia.   Neurological: Negative for excessive daytime sleepiness, dizziness, headaches, light-headedness, loss of balance, numbness, vertigo and weakness.       Objective:   Physical Exam   Constitutional: He is oriented to person, place, and time. He appears well-developed and well-nourished.   Neck: Neck supple. No JVD present.   Cardiovascular: Normal rate, regular rhythm, normal heart sounds and normal pulses. Exam  reveals no friction rub.   No murmur heard.  Pulmonary/Chest: Effort normal and breath sounds normal. No respiratory distress. He has no wheezes. He has no rales.   Abdominal: Soft. Bowel sounds are normal. He exhibits no distension.   Musculoskeletal:         General: Edema (trace edema bilaterally ) present. No tenderness.      Comments: Severe kyphosis    Neurological: He is alert and oriented to person, place, and time.   Skin: Skin is warm and dry. No rash noted.   BLE venous stasis dermatitis    Psychiatric: He has a normal mood and affect. His behavior is normal.   Nursing note and vitals reviewed.    Vitals:    12/22/20 0851   BP: 118/82   BP Location: Left arm   Patient Position: Sitting   Pulse: 90   SpO2: 98%     Lab Results   Component Value Date    CHOL 169 02/28/2020     Lab Results   Component Value Date    HDL 43 02/28/2020     Lab Results   Component Value Date    LDLCALC 107.4 02/28/2020     Lab Results   Component Value Date    TRIG 93 02/28/2020     Lab Results   Component Value Date    CHOLHDL 25.4 02/28/2020       Chemistry        Component Value Date/Time     12/10/2020 0641    K 4.3 12/10/2020 0641     12/10/2020 0641    CO2 25 12/10/2020 0641    BUN 28 (H) 12/10/2020 0641    CREATININE 1.8 (H) 12/10/2020 0641    GLU 88 12/10/2020 0641        Component Value Date/Time    CALCIUM 7.8 (L) 12/10/2020 0641    ALKPHOS 106 12/08/2020 1540    AST 12 12/08/2020 1540    ALT 12 12/08/2020 1540    BILITOT 0.9 12/08/2020 1540    ESTGFRAFRICA 40 (A) 12/10/2020 0641    EGFRNONAA 34 (A) 12/10/2020 0641          Lab Results   Component Value Date    TSH 0.973 04/15/2013     Lab Results   Component Value Date    INR 1.2 10/15/2020    INR 1.3 (H) 05/13/2020    INR 1.1 01/23/2020     Lab Results   Component Value Date    WBC 9.09 12/10/2020    HGB 8.6 (L) 12/10/2020    HCT 28.8 (L) 12/10/2020    MCV 97 12/10/2020     12/10/2020     BMP  Sodium   Date Value Ref Range Status   12/10/2020 136  136 - 145 mmol/L Final     Potassium   Date Value Ref Range Status   12/10/2020 4.3 3.5 - 5.1 mmol/L Final     Chloride   Date Value Ref Range Status   12/10/2020 107 95 - 110 mmol/L Final     CO2   Date Value Ref Range Status   12/10/2020 25 23 - 29 mmol/L Final     BUN   Date Value Ref Range Status   12/10/2020 28 (H) 8 - 23 mg/dL Final     Creatinine   Date Value Ref Range Status   12/10/2020 1.8 (H) 0.5 - 1.4 mg/dL Final     Calcium   Date Value Ref Range Status   12/10/2020 7.8 (L) 8.7 - 10.5 mg/dL Final     Anion Gap   Date Value Ref Range Status   12/10/2020 4 (L) 8 - 16 mmol/L Final     eGFR if    Date Value Ref Range Status   12/10/2020 40 (A) >60 mL/min/1.73 m^2 Final     eGFR if non    Date Value Ref Range Status   12/10/2020 34 (A) >60 mL/min/1.73 m^2 Final     Comment:     Calculation used to obtain the estimated glomerular filtration  rate (eGFR) is the CKD-EPI equation.        CrCl cannot be calculated (Patient's most recent lab result is older than the maximum 7 days allowed.).    Assessment:     1. Chronic systolic congestive heart failure    2. Chronic atrial fibrillation        Plan:   Continue lopressor and Eliquis for A-fib   Low sodium diet   Urology follow up as scheduled   RTC in 1 year or sooner if needed

## 2020-12-28 ENCOUNTER — TELEPHONE (OUTPATIENT)
Dept: UROLOGY | Facility: CLINIC | Age: 80
End: 2020-12-28

## 2020-12-28 ENCOUNTER — TELEPHONE (OUTPATIENT)
Dept: INTERNAL MEDICINE | Facility: CLINIC | Age: 80
End: 2020-12-28

## 2020-12-28 RX ORDER — BACLOFEN 10 MG/1
10 TABLET ORAL 3 TIMES DAILY PRN
Qty: 90 TABLET | Refills: 0 | Status: SHIPPED | OUTPATIENT
Start: 2020-12-28 | End: 2020-12-31

## 2020-12-28 NOTE — TELEPHONE ENCOUNTER
Patient is having increased back pain and stiffness in the arms would like to know can he have a muscle relaxant.  /sl/

## 2020-12-31 ENCOUNTER — OFFICE VISIT (OUTPATIENT)
Dept: INTERNAL MEDICINE | Facility: CLINIC | Age: 80
End: 2020-12-31
Payer: MEDICARE

## 2020-12-31 ENCOUNTER — TELEPHONE (OUTPATIENT)
Dept: INTERNAL MEDICINE | Facility: CLINIC | Age: 80
End: 2020-12-31

## 2020-12-31 DIAGNOSIS — R53.81 DEBILITY: Primary | ICD-10-CM

## 2020-12-31 PROCEDURE — 99443 PR PHYSICIAN TELEPHONE EVALUATION 21-30 MIN: CPT | Mod: HCNC,95,, | Performed by: FAMILY MEDICINE

## 2020-12-31 PROCEDURE — 99443 PR PHYSICIAN TELEPHONE EVALUATION 21-30 MIN: ICD-10-PCS | Mod: HCNC,95,, | Performed by: FAMILY MEDICINE

## 2020-12-31 NOTE — PROGRESS NOTES
"Subjective:       Patient ID: Yan Pickering Jr. is a 80 y.o. male.    Chief Complaint: No chief complaint on file.    HPI     80    Past Medical History:   Diagnosis Date    Atrial fibrillation     Cardiomyopathy 4/26/2017    CHF (congestive heart failure)     CKD (chronic kidney disease) stage 3, GFR 30-59 ml/min     Mild mitral insufficiency     Moderate aortic insufficiency     Venous stasis dermatitis of both lower extremities      No past surgical history on file.  Social History     Tobacco Use   Smoking Status Never Smoker   Smokeless Tobacco Never Used     Family History   Problem Relation Age of Onset    No Known Problems Mother        Spoke to wife this am  Reports he wasn't feeling good, staying in bed, and not eating.    Called again this evening -   Things had improved somewhat  - he began eating  He is now agreeing to take meds.    He sounds as if in good spirits and currently in no distress.      Over last 2 days - he hasn't been doing good.    Did not want to go to hospital  Appetite is poor  He is not eating  He is not taking medications.  He has not been getting out of the bed.      We tried a muscle relaxant for pain  Evening he took it things seem to get worse.  Slept more  He feels he had some side effects - he has not taken it more than 2x and didn't like it.      His ex-wife reports he is not taking his medication.  He feels it is having side effects and doesn't want to take it.  Refused blood the thinner or the metoprolol    A/P   A. Fib  Weakness  Debility    Home health to be obtained. I feel may benefit for nursing/ pt/ potentially ST.    Broached the subject of hospice and he may yet still be a better candidate for such. He is very hesitant to do home health or hospice.    Mr. Heredia is quite pleasant but well known to me to minimize problems and avoid doing ANYTHING to address issues.  He always wants to defer until later, to "think about it, or consider it when discussing just " about anything.    I do worry he is overall declining, and I know his ex-wife (his caretaker) is struggling managing his care.    I am acutely worried about him declining and recommended ER visit today - but both (he and ex) declined doing such.  They did agree if over the weekend he declines they will call 911 and present to ER.

## 2021-01-07 ENCOUNTER — PATIENT OUTREACH (OUTPATIENT)
Dept: ADMINISTRATIVE | Facility: OTHER | Age: 81
End: 2021-01-07

## 2021-01-08 ENCOUNTER — OFFICE VISIT (OUTPATIENT)
Dept: UROLOGY | Facility: CLINIC | Age: 81
End: 2021-01-08
Payer: MEDICARE

## 2021-01-08 VITALS — WEIGHT: 127.19 LBS | TEMPERATURE: 98 F | BODY MASS INDEX: 20.53 KG/M2

## 2021-01-08 DIAGNOSIS — R33.9 URINARY RETENTION: Primary | ICD-10-CM

## 2021-01-08 DIAGNOSIS — N40.1 BENIGN PROSTATIC HYPERPLASIA WITH URINARY OBSTRUCTION: ICD-10-CM

## 2021-01-08 DIAGNOSIS — N13.8 BENIGN PROSTATIC HYPERPLASIA WITH URINARY OBSTRUCTION: ICD-10-CM

## 2021-01-08 PROCEDURE — 99499 NO LOS: ICD-10-PCS | Mod: HCNC,S$GLB,, | Performed by: UROLOGY

## 2021-01-08 PROCEDURE — 1101F PT FALLS ASSESS-DOCD LE1/YR: CPT | Mod: HCNC,CPTII,S$GLB, | Performed by: UROLOGY

## 2021-01-08 PROCEDURE — 1126F PR PAIN SEVERITY QUANTIFIED, NO PAIN PRESENT: ICD-10-PCS | Mod: HCNC,S$GLB,, | Performed by: UROLOGY

## 2021-01-08 PROCEDURE — 1101F PR PT FALLS ASSESS DOC 0-1 FALLS W/OUT INJ PAST YR: ICD-10-PCS | Mod: HCNC,CPTII,S$GLB, | Performed by: UROLOGY

## 2021-01-08 PROCEDURE — 99999 PR PBB SHADOW E&M-EST. PATIENT-LVL III: CPT | Mod: PBBFAC,HCNC,, | Performed by: UROLOGY

## 2021-01-08 PROCEDURE — 99499 UNLISTED E&M SERVICE: CPT | Mod: HCNC,S$GLB,, | Performed by: UROLOGY

## 2021-01-08 PROCEDURE — 3288F FALL RISK ASSESSMENT DOCD: CPT | Mod: HCNC,CPTII,S$GLB, | Performed by: UROLOGY

## 2021-01-08 PROCEDURE — 99999 PR PBB SHADOW E&M-EST. PATIENT-LVL III: ICD-10-PCS | Mod: PBBFAC,HCNC,, | Performed by: UROLOGY

## 2021-01-08 PROCEDURE — 1159F PR MEDICATION LIST DOCUMENTED IN MEDICAL RECORD: ICD-10-PCS | Mod: HCNC,S$GLB,, | Performed by: UROLOGY

## 2021-01-08 PROCEDURE — 1126F AMNT PAIN NOTED NONE PRSNT: CPT | Mod: HCNC,S$GLB,, | Performed by: UROLOGY

## 2021-01-08 PROCEDURE — 1159F MED LIST DOCD IN RCRD: CPT | Mod: HCNC,S$GLB,, | Performed by: UROLOGY

## 2021-01-08 PROCEDURE — 3288F PR FALLS RISK ASSESSMENT DOCUMENTED: ICD-10-PCS | Mod: HCNC,CPTII,S$GLB, | Performed by: UROLOGY

## 2021-01-08 PROCEDURE — 51702 INSERT TEMP BLADDER CATH: CPT | Mod: HCNC,S$GLB,, | Performed by: UROLOGY

## 2021-01-08 PROCEDURE — 51702 PR INSERTION OF TEMPORARY INDWELLING BLADDER CATHETER, SIMPLE: ICD-10-PCS | Mod: HCNC,S$GLB,, | Performed by: UROLOGY

## 2021-01-12 PROCEDURE — G0180 PR HOME HEALTH MD CERTIFICATION: ICD-10-PCS | Mod: ,,, | Performed by: FAMILY MEDICINE

## 2021-01-12 PROCEDURE — G0180 MD CERTIFICATION HHA PATIENT: HCPCS | Mod: ,,, | Performed by: FAMILY MEDICINE

## 2021-01-15 ENCOUNTER — OFFICE VISIT (OUTPATIENT)
Dept: INTERNAL MEDICINE | Facility: CLINIC | Age: 81
End: 2021-01-15
Payer: MEDICARE

## 2021-01-15 ENCOUNTER — LAB VISIT (OUTPATIENT)
Dept: LAB | Facility: HOSPITAL | Age: 81
End: 2021-01-15
Attending: FAMILY MEDICINE
Payer: MEDICARE

## 2021-01-15 VITALS
TEMPERATURE: 98 F | HEIGHT: 66 IN | BODY MASS INDEX: 20.44 KG/M2 | OXYGEN SATURATION: 99 % | DIASTOLIC BLOOD PRESSURE: 73 MMHG | WEIGHT: 127.19 LBS | HEART RATE: 121 BPM | SYSTOLIC BLOOD PRESSURE: 113 MMHG | RESPIRATION RATE: 18 BRPM

## 2021-01-15 DIAGNOSIS — I50.22 CHRONIC SYSTOLIC CONGESTIVE HEART FAILURE: ICD-10-CM

## 2021-01-15 DIAGNOSIS — D64.9 ANEMIA, UNSPECIFIED TYPE: ICD-10-CM

## 2021-01-15 DIAGNOSIS — D64.9 ANEMIA, UNSPECIFIED TYPE: Primary | ICD-10-CM

## 2021-01-15 DIAGNOSIS — R63.0 POOR APPETITE: ICD-10-CM

## 2021-01-15 DIAGNOSIS — R53.81 DEBILITY: ICD-10-CM

## 2021-01-15 DIAGNOSIS — R53.1 WEAKNESS: ICD-10-CM

## 2021-01-15 DIAGNOSIS — R45.89 DEPRESSED MOOD: ICD-10-CM

## 2021-01-15 LAB
BASOPHILS # BLD AUTO: 0.05 K/UL (ref 0–0.2)
BASOPHILS NFR BLD: 0.8 % (ref 0–1.9)
DIFFERENTIAL METHOD: ABNORMAL
EOSINOPHIL # BLD AUTO: 0 K/UL (ref 0–0.5)
EOSINOPHIL NFR BLD: 0.3 % (ref 0–8)
ERYTHROCYTE [DISTWIDTH] IN BLOOD BY AUTOMATED COUNT: 18.2 % (ref 11.5–14.5)
HCT VFR BLD AUTO: 36.9 % (ref 40–54)
HGB BLD-MCNC: 10.3 G/DL (ref 14–18)
IMM GRANULOCYTES # BLD AUTO: 0.02 K/UL (ref 0–0.04)
IMM GRANULOCYTES NFR BLD AUTO: 0.3 % (ref 0–0.5)
LYMPHOCYTES # BLD AUTO: 1.2 K/UL (ref 1–4.8)
LYMPHOCYTES NFR BLD: 18.5 % (ref 18–48)
MCH RBC QN AUTO: 29.3 PG (ref 27–31)
MCHC RBC AUTO-ENTMCNC: 27.9 G/DL (ref 32–36)
MCV RBC AUTO: 105 FL (ref 82–98)
MONOCYTES # BLD AUTO: 0.8 K/UL (ref 0.3–1)
MONOCYTES NFR BLD: 12 % (ref 4–15)
NEUTROPHILS # BLD AUTO: 4.5 K/UL (ref 1.8–7.7)
NEUTROPHILS NFR BLD: 68.1 % (ref 38–73)
NRBC BLD-RTO: 0 /100 WBC
PLATELET # BLD AUTO: 201 K/UL (ref 150–350)
PMV BLD AUTO: 10.5 FL (ref 9.2–12.9)
RBC # BLD AUTO: 3.51 M/UL (ref 4.6–6.2)
WBC # BLD AUTO: 6.61 K/UL (ref 3.9–12.7)

## 2021-01-15 PROCEDURE — 1159F PR MEDICATION LIST DOCUMENTED IN MEDICAL RECORD: ICD-10-PCS | Mod: HCNC,S$GLB,, | Performed by: FAMILY MEDICINE

## 2021-01-15 PROCEDURE — 1101F PR PT FALLS ASSESS DOC 0-1 FALLS W/OUT INJ PAST YR: ICD-10-PCS | Mod: HCNC,CPTII,S$GLB, | Performed by: FAMILY MEDICINE

## 2021-01-15 PROCEDURE — 84443 ASSAY THYROID STIM HORMONE: CPT | Mod: HCNC

## 2021-01-15 PROCEDURE — 3078F PR MOST RECENT DIASTOLIC BLOOD PRESSURE < 80 MM HG: ICD-10-PCS | Mod: HCNC,CPTII,S$GLB, | Performed by: FAMILY MEDICINE

## 2021-01-15 PROCEDURE — 99999 PR PBB SHADOW E&M-EST. PATIENT-LVL IV: CPT | Mod: PBBFAC,HCNC,, | Performed by: FAMILY MEDICINE

## 2021-01-15 PROCEDURE — 1126F PR PAIN SEVERITY QUANTIFIED, NO PAIN PRESENT: ICD-10-PCS | Mod: HCNC,S$GLB,, | Performed by: FAMILY MEDICINE

## 2021-01-15 PROCEDURE — 3288F PR FALLS RISK ASSESSMENT DOCUMENTED: ICD-10-PCS | Mod: HCNC,CPTII,S$GLB, | Performed by: FAMILY MEDICINE

## 2021-01-15 PROCEDURE — 3078F DIAST BP <80 MM HG: CPT | Mod: HCNC,CPTII,S$GLB, | Performed by: FAMILY MEDICINE

## 2021-01-15 PROCEDURE — 99499 UNLISTED E&M SERVICE: CPT | Mod: S$GLB,,, | Performed by: FAMILY MEDICINE

## 2021-01-15 PROCEDURE — 1101F PT FALLS ASSESS-DOCD LE1/YR: CPT | Mod: HCNC,CPTII,S$GLB, | Performed by: FAMILY MEDICINE

## 2021-01-15 PROCEDURE — 3074F SYST BP LT 130 MM HG: CPT | Mod: HCNC,CPTII,S$GLB, | Performed by: FAMILY MEDICINE

## 2021-01-15 PROCEDURE — 99499 RISK ADDL DX/OHS AUDIT: ICD-10-PCS | Mod: S$GLB,,, | Performed by: FAMILY MEDICINE

## 2021-01-15 PROCEDURE — 1159F MED LIST DOCD IN RCRD: CPT | Mod: HCNC,S$GLB,, | Performed by: FAMILY MEDICINE

## 2021-01-15 PROCEDURE — 1126F AMNT PAIN NOTED NONE PRSNT: CPT | Mod: HCNC,S$GLB,, | Performed by: FAMILY MEDICINE

## 2021-01-15 PROCEDURE — 3288F FALL RISK ASSESSMENT DOCD: CPT | Mod: HCNC,CPTII,S$GLB, | Performed by: FAMILY MEDICINE

## 2021-01-15 PROCEDURE — 99214 PR OFFICE/OUTPT VISIT, EST, LEVL IV, 30-39 MIN: ICD-10-PCS | Mod: HCNC,S$GLB,, | Performed by: FAMILY MEDICINE

## 2021-01-15 PROCEDURE — 3074F PR MOST RECENT SYSTOLIC BLOOD PRESSURE < 130 MM HG: ICD-10-PCS | Mod: HCNC,CPTII,S$GLB, | Performed by: FAMILY MEDICINE

## 2021-01-15 PROCEDURE — 85025 COMPLETE CBC W/AUTO DIFF WBC: CPT | Mod: HCNC

## 2021-01-15 PROCEDURE — 99999 PR PBB SHADOW E&M-EST. PATIENT-LVL IV: ICD-10-PCS | Mod: PBBFAC,HCNC,, | Performed by: FAMILY MEDICINE

## 2021-01-15 PROCEDURE — 82728 ASSAY OF FERRITIN: CPT | Mod: HCNC

## 2021-01-15 PROCEDURE — 36415 COLL VENOUS BLD VENIPUNCTURE: CPT | Mod: HCNC

## 2021-01-15 PROCEDURE — 80053 COMPREHEN METABOLIC PANEL: CPT | Mod: HCNC

## 2021-01-15 PROCEDURE — 83880 ASSAY OF NATRIURETIC PEPTIDE: CPT | Mod: HCNC

## 2021-01-15 PROCEDURE — 99214 OFFICE O/P EST MOD 30 MIN: CPT | Mod: HCNC,S$GLB,, | Performed by: FAMILY MEDICINE

## 2021-01-15 RX ORDER — MIRTAZAPINE 15 MG/1
15 TABLET, FILM COATED ORAL NIGHTLY
Qty: 30 TABLET | Refills: 2 | Status: SHIPPED | OUTPATIENT
Start: 2021-01-15 | End: 2021-01-25

## 2021-01-16 LAB
ALBUMIN SERPL BCP-MCNC: 3.1 G/DL (ref 3.5–5.2)
ALP SERPL-CCNC: 182 U/L (ref 55–135)
ALT SERPL W/O P-5'-P-CCNC: 15 U/L (ref 10–44)
ANION GAP SERPL CALC-SCNC: 10 MMOL/L (ref 8–16)
AST SERPL-CCNC: 14 U/L (ref 10–40)
BILIRUB SERPL-MCNC: 0.8 MG/DL (ref 0.1–1)
BNP SERPL-MCNC: 2016 PG/ML (ref 0–99)
BUN SERPL-MCNC: 24 MG/DL (ref 8–23)
CALCIUM SERPL-MCNC: 8.9 MG/DL (ref 8.7–10.5)
CHLORIDE SERPL-SCNC: 109 MMOL/L (ref 95–110)
CO2 SERPL-SCNC: 23 MMOL/L (ref 23–29)
CREAT SERPL-MCNC: 1.5 MG/DL (ref 0.5–1.4)
EST. GFR  (AFRICAN AMERICAN): 49.4 ML/MIN/1.73 M^2
EST. GFR  (NON AFRICAN AMERICAN): 42.7 ML/MIN/1.73 M^2
FERRITIN SERPL-MCNC: 76 NG/ML (ref 20–300)
GLUCOSE SERPL-MCNC: 76 MG/DL (ref 70–110)
POTASSIUM SERPL-SCNC: 5.4 MMOL/L (ref 3.5–5.1)
PROT SERPL-MCNC: 6.2 G/DL (ref 6–8.4)
SODIUM SERPL-SCNC: 142 MMOL/L (ref 136–145)
TSH SERPL DL<=0.005 MIU/L-ACNC: 2.25 UIU/ML (ref 0.4–4)

## 2021-01-22 ENCOUNTER — EXTERNAL HOME HEALTH (OUTPATIENT)
Dept: HOME HEALTH SERVICES | Facility: HOSPITAL | Age: 81
End: 2021-01-22
Payer: MEDICARE

## 2021-01-25 ENCOUNTER — OFFICE VISIT (OUTPATIENT)
Dept: INTERNAL MEDICINE | Facility: CLINIC | Age: 81
End: 2021-01-25
Payer: MEDICARE

## 2021-01-25 DIAGNOSIS — Z71.89 ENCOUNTER FOR HOSPICE CARE DISCUSSION: Primary | ICD-10-CM

## 2021-01-25 PROCEDURE — 99441 PR PHYSICIAN TELEPHONE EVALUATION 5-10 MIN: CPT | Mod: HCNC,95,, | Performed by: FAMILY MEDICINE

## 2021-01-25 PROCEDURE — 99441 PR PHYSICIAN TELEPHONE EVALUATION 5-10 MIN: ICD-10-PCS | Mod: HCNC,95,, | Performed by: FAMILY MEDICINE

## 2021-02-12 ENCOUNTER — PES CALL (OUTPATIENT)
Dept: ADMINISTRATIVE | Facility: CLINIC | Age: 81
End: 2021-02-12

## 2021-02-19 ENCOUNTER — TELEPHONE (OUTPATIENT)
Dept: UROLOGY | Facility: CLINIC | Age: 81
End: 2021-02-19

## 2021-03-30 ENCOUNTER — TELEPHONE (OUTPATIENT)
Dept: INTERNAL MEDICINE | Facility: CLINIC | Age: 81
End: 2021-03-30

## 2021-04-07 DIAGNOSIS — N18.9 CHRONIC KIDNEY DISEASE, UNSPECIFIED CKD STAGE: ICD-10-CM

## 2021-05-07 ENCOUNTER — OFFICE VISIT (OUTPATIENT)
Dept: DERMATOLOGY | Facility: CLINIC | Age: 81
End: 2021-05-07
Payer: MEDICARE

## 2021-05-07 ENCOUNTER — PATIENT OUTREACH (OUTPATIENT)
Dept: ADMINISTRATIVE | Facility: OTHER | Age: 81
End: 2021-05-07

## 2021-05-07 DIAGNOSIS — L57.0 ACTINIC KERATOSES: Primary | ICD-10-CM

## 2021-05-07 PROCEDURE — 17000 DESTRUCT PREMALG LESION: CPT | Mod: S$GLB,,, | Performed by: STUDENT IN AN ORGANIZED HEALTH CARE EDUCATION/TRAINING PROGRAM

## 2021-05-07 PROCEDURE — 99999 PR PBB SHADOW E&M-EST. PATIENT-LVL III: CPT | Mod: PBBFAC,,, | Performed by: STUDENT IN AN ORGANIZED HEALTH CARE EDUCATION/TRAINING PROGRAM

## 2021-05-07 PROCEDURE — 99499 NO LOS: ICD-10-PCS | Mod: S$GLB,,, | Performed by: STUDENT IN AN ORGANIZED HEALTH CARE EDUCATION/TRAINING PROGRAM

## 2021-05-07 PROCEDURE — 99499 UNLISTED E&M SERVICE: CPT | Mod: S$GLB,,, | Performed by: STUDENT IN AN ORGANIZED HEALTH CARE EDUCATION/TRAINING PROGRAM

## 2021-05-07 PROCEDURE — 17003 DESTRUCT PREMALG LES 2-14: CPT | Mod: S$GLB,,, | Performed by: STUDENT IN AN ORGANIZED HEALTH CARE EDUCATION/TRAINING PROGRAM

## 2021-05-07 PROCEDURE — 17000 PR DESTRUCTION(LASER SURGERY,CRYOSURGERY,CHEMOSURGERY),PREMALIGNANT LESIONS,FIRST LESION: ICD-10-PCS | Mod: S$GLB,,, | Performed by: STUDENT IN AN ORGANIZED HEALTH CARE EDUCATION/TRAINING PROGRAM

## 2021-05-07 PROCEDURE — 17003 DESTRUCTION, PREMALIGNANT LESIONS; SECOND THROUGH 14 LESIONS: ICD-10-PCS | Mod: S$GLB,,, | Performed by: STUDENT IN AN ORGANIZED HEALTH CARE EDUCATION/TRAINING PROGRAM

## 2021-05-07 PROCEDURE — 99999 PR PBB SHADOW E&M-EST. PATIENT-LVL III: ICD-10-PCS | Mod: PBBFAC,,, | Performed by: STUDENT IN AN ORGANIZED HEALTH CARE EDUCATION/TRAINING PROGRAM

## 2021-05-07 RX ORDER — LORAZEPAM 0.5 MG/1
0.5 TABLET ORAL EVERY 6 HOURS PRN
COMMUNITY
Start: 2021-03-16

## 2021-05-07 RX ORDER — LOSARTAN POTASSIUM 25 MG/1
TABLET ORAL
COMMUNITY
Start: 2021-02-09

## 2021-05-07 RX ORDER — TRAZODONE HYDROCHLORIDE 100 MG/1
100 TABLET ORAL NIGHTLY
COMMUNITY
Start: 2021-03-29

## 2021-05-07 RX ORDER — TAMSULOSIN HYDROCHLORIDE 0.4 MG/1
CAPSULE ORAL
COMMUNITY
Start: 2021-04-21

## 2021-07-30 ENCOUNTER — IMMUNIZATION (OUTPATIENT)
Dept: INTERNAL MEDICINE | Facility: CLINIC | Age: 81
End: 2021-07-30
Payer: MEDICARE

## 2021-07-30 DIAGNOSIS — Z23 NEED FOR VACCINATION: Primary | ICD-10-CM

## 2021-07-30 PROCEDURE — 91300 COVID-19, MRNA, LNP-S, PF, 30 MCG/0.3 ML DOSE VACCINE: CPT | Mod: PBBFAC | Performed by: FAMILY MEDICINE

## 2021-08-20 ENCOUNTER — IMMUNIZATION (OUTPATIENT)
Dept: PRIMARY CARE CLINIC | Facility: CLINIC | Age: 81
End: 2021-08-20
Payer: MEDICARE

## 2021-08-20 DIAGNOSIS — Z23 NEED FOR VACCINATION: Primary | ICD-10-CM

## 2021-08-20 PROCEDURE — 91300 COVID-19, MRNA, LNP-S, PF, 30 MCG/0.3 ML DOSE VACCINE: CPT | Mod: S$GLB,,, | Performed by: FAMILY MEDICINE

## 2021-08-20 PROCEDURE — 91300 COVID-19, MRNA, LNP-S, PF, 30 MCG/0.3 ML DOSE VACCINE: ICD-10-PCS | Mod: S$GLB,,, | Performed by: FAMILY MEDICINE

## 2021-08-20 PROCEDURE — 0002A COVID-19, MRNA, LNP-S, PF, 30 MCG/0.3 ML DOSE VACCINE: ICD-10-PCS | Mod: CV19,S$GLB,, | Performed by: FAMILY MEDICINE

## 2021-08-20 PROCEDURE — 0002A COVID-19, MRNA, LNP-S, PF, 30 MCG/0.3 ML DOSE VACCINE: CPT | Mod: CV19,S$GLB,, | Performed by: FAMILY MEDICINE

## 2021-10-06 NOTE — ED PROVIDER NOTES
No retinal holes or tears seen on exam. Recommended OBSERVATION. We reviewed the signs and symptoms of retinal tear/retinal detachment and the importance of prompt evaluation should there be increasing floaters, new flashing lights, or decreasing peripheral vision in either eye at any time. Patient understands condition, prognosis and need for follow up care. SCRIBE #1 NOTE: I, Alana Paige, am scribing for, and in the presence of, Blayne Nichole Jr., MD. I have scribed the entire note.      History      Chief Complaint   Patient presents with    Varicose Veins     vein in R foot started bleeding and was squirting out; bleeding controlled at this time.       Review of patient's allergies indicates:  No Known Allergies     HPI   HPI    12/16/2017, 3:48 AM   History obtained from the patient      History of Present Illness: Yan Pickering Jr. is a 77 y.o. male patient who presents to the Emergency Department for R foot varicose vein bleeding which onset gradually at 2am. Patient reports hitting his R foot on his flip flop and then began bleeding. Patient is on Eliquis. Symptoms are constant and moderate in severity. No mitigating or exacerbating factors reported. No associated sxs included. Patient denies any other injury, fever, chills, R foot pain, extremity weakness/numbness, and all other sxs at this time. No further complaints or concerns at this time.     Arrival mode: Personal vehicle     PCP: Miller Dinero MD       Past Medical History:  Past Medical History:   Diagnosis Date    Atrial fibrillation     Cardiomyopathy 4/26/2017    CKD (chronic kidney disease) stage 3, GFR 30-59 ml/min     Mild mitral insufficiency     Moderate aortic insufficiency     Venous stasis dermatitis of both lower extremities        Past Surgical History:  No past surgical history on file.      Family History:  No family history on file.    Social History:  Social History     Social History Main Topics    Smoking status: Never Smoker    Smokeless tobacco: Never Used    Alcohol use No    Drug use: No    Sexual activity: Unknown       ROS   Review of Systems   Constitutional: Negative for chills and fever.        (+) R foot varicose vein bleeding   HENT: Negative for sore throat.    Respiratory: Negative for shortness of breath.    Cardiovascular: Negative for chest pain.    Gastrointestinal: Negative for nausea.   Genitourinary: Negative for dysuria.   Musculoskeletal: Negative for back pain.        (-) R foot pain   Skin: Negative for rash.   Neurological: Negative for weakness and numbness.   Hematological: Does not bruise/bleed easily.   All other systems reviewed and are negative.      Physical Exam      Initial Vitals [12/16/17 0325]   BP Pulse Resp Temp SpO2   133/75 97 20 97.9 °F (36.6 °C) 97 %      MAP       94.33          Physical Exam  Nursing Notes and Vital Signs Reviewed.  Constitutional: Patient is in no acute distress. Well-developed and well-nourished.  Head: Atraumatic. Normocephalic.  Eyes: PERRL. EOM intact. Conjunctivae are not pale. No scleral icterus.  ENT: Mucous membranes are moist. Oropharynx is clear and symmetric.    Neck: Supple. Full ROM. No lymphadenopathy.  Cardiovascular: Regular rate. Regular rhythm. No murmurs, rubs, or gallops. Distal pulses are 2+ and symmetric.  Pulmonary/Chest: No respiratory distress. Clear to auscultation bilaterally. No wheezing or rales.  Abdominal: Soft and non-distended.  There is no tenderness.  No rebound, guarding, or rigidity. Good bowel sounds.  Genitourinary: No CVA tenderness  Musculoskeletal: Moves all extremities. No obvious deformities. No edema. No calf tenderness.  Skin: Warm and dry. Scab over superficial varicose vein on dorsum of R foot, no active bleeding.  Neurological:  Alert, awake, and appropriate.  Normal speech.  No acute focal neurological deficits are appreciated.  Psychiatric: Normal affect. Good eye contact. Appropriate in content.    ED Course    Procedures  ED Vital Signs:  Vitals:    12/16/17 0325   BP: 133/75   Pulse: 97   Resp: 20   Temp: 97.9 °F (36.6 °C)   TempSrc: Oral   SpO2: 97%   Weight: 55.6 kg (122 lb 9.2 oz)              The Emergency Provider reviewed the vital signs and test results, which are outlined above.    ED Discussion     4:03 AM: Reassessed pt at this time. Patient's R foot  cleaned and redressed at this time. Pt states his condition has improved at this time. Discussed with pt all pertinent ED information and results. Discussed pt dx and plan of tx. Gave pt all f/u and return to the ED instructions. All questions and concerns were addressed at this time. Pt expresses understanding of information and instructions, and is comfortable with plan to discharge. Pt is stable for discharge.    I discussed with patient and/or family/caretaker that evaluation in the ED does not suggest any emergent or life threatening medical conditions requiring immediate intervention beyond what was provided in the ED, and I believe patient is safe for discharge.  Regardless, an unremarkable evaluation in the ED does not preclude the development or presence of a serious of life threatening condition. As such, patient was instructed to return immediately for any worsening or change in current symptoms.      ED Medication(s):  Medications - No data to display    Discharge Medication List as of 12/16/2017  4:24 AM          Follow-up Information     Miller Dinero MD. Schedule an appointment as soon as possible for a visit in 1 week.    Specialty:  Internal Medicine  Contact information:  [address]  [address]  [facility] [address]  [phone]             [facility]    Specialty:  Emergency Medicine  Why:  As needed, If symptoms worsen  Contact information:  [address]  [facility] [address] [phone]                   Medical Decision Making              Scribe Attestation:   Scribe #1: I performed the above scribed service and the documentation accurately describes the services I performed. I attest to the accuracy of the note.    Attending:   Physician Attestation Statement for Scribe #1: I, Blayne Nichole Jr., MD, personally performed the services described in this documentation, as scribed by Alana Paige, in my presence, and it is both accurate and  complete.          Clinical Impression       ICD-10-CM ICD-9-CM   1. Bleeding from varicose vein I83.899 454.8       Disposition:   Disposition: Discharged  Condition: Stable         Blayne Nichole Jr., MD  12/22/17 0401

## 2024-04-08 NOTE — ASSESSMENT & PLAN NOTE
MEDICARE WELLNESS VISIT NOTE    HISTORY OF PRESENT ILLNESS:   LUIS presents for his Subsequent Annual Medicare Wellness Visit.   He has last 4 day his a cough that he is developed.  No fevers.     Patient Care Team:  Abiodun Herrera MD as PCP - General (Family Practice)        Patient Active Problem List   Diagnosis    Environmental allergies    Shoulder mass    Nonallopathic lesion of thoracic region, not elsewhere classified    Lumbago    AAA (abdominal aortic aneurysm) (CMD)    Iliac artery stenosis, left (CMD)    Tobacco abuse    Umbilical hernia    GERD (gastroesophageal reflux disease)    Amblyopia, strabismic    Pseudophakia of right eye    Hyperlipidemia    Combined forms of age-related cataract of left eye    Myopia of right eye with astigmatism and presbyopia    Astigmatism of left eye with presbyopia    Dry eye syndrome of both eyes    Respiratory distress         Past Medical History:   Diagnosis Date    AAA (abdominal aortic aneurysm) (CMD) 8/25/2014    Bronchitis     Chronic pain     lower back pain per patient    Environmental allergies 9/7/2011    GERD (gastroesophageal reflux disease)     Hyperlipidemia     Iliac artery stenosis, left (CMD) 1/19/2016    Lumbago 4/13/2013    Palate deformity     Pneumonia     Pulmonary nodule     Shoulder mass 3/21/2012    Sinusitis, chronic 6/2016    sinus infection    Tobacco abuse     Umbilical hernia 1/19/2016         Past Surgical History:   Procedure Laterality Date    Cardiac catherization      cardiac cath    Colonoscopy  01/12/2011    due 1/12/16, had two before per patient not sure of date    Colonoscopy  09/16/2016    Affi 5yr recall, 5 polyps tubular adenomas hyperplastic    Endovasular aaa repair (evar)  09/20/2017    Excisn coarct aorta w graft  09/20/2017    EVAR with Dr. neal and Dr. Putnam    Extracapsular cataract removal w insert io lens prosth wo ecp  06/06/2016    right eye    Eye muscle surgery Left     age 7    Open access colonoscopy   G+ Cocci  Await s/n  Possible contaminant     2022    due 25    Oral surgery procedure  2022    tooth removed with the dentist on antibiotics augmentin for tooth infection.    Reconst cleft palate,soft/hard      age 4    Strabismus surgery      left eye         Social History     Tobacco Use    Smoking status: Former     Current packs/day: 0.00     Average packs/day: 0.5 packs/day for 55.0 years (27.5 ttl pk-yrs)     Types: Cigarettes     Quit date: 2024     Years since quittin.1     Passive exposure: Past    Smokeless tobacco: Never   Vaping Use    Vaping Use: never used   Substance Use Topics    Alcohol use: Yes     Alcohol/week: 0.0 standard drinks of alcohol     Comment: special occasions     Drug use: No     Drug use:    Drug Use:    No              Family History   Problem Relation Age of Onset    Heart Mother     Diabetes Mother     Hyperlipidemia Mother     Heart Father     Cancer Sister         colon    Heart Brother     Hyperlipidemia Brother     Heart Brother     Hyperlipidemia Brother        Current Outpatient Medications   Medication Sig Dispense Refill    Cholecalciferol (VITAMIN D-3 PO) Take 5,000 Int'l Units/L by mouth daily.      Menaquinone-7 (VITAMIN K2 PO) Take 200 mcg by mouth daily.      MAGNESIUM GLYCINATE PO Take 400 mg by mouth daily.      losartan (COZAAR) 25 MG tablet Take 1 tablet by mouth in the morning and 1 tablet in the evening. 60 tablet 11    carvedilol (COREG) 3.125 MG tablet Take 1 tablet by mouth every 12 hours. 180 tablet 3    atorvastatin (LIPITOR) 20 MG tablet Take 1 tablet by mouth nightly. 90 tablet 3    empagliflozin (JARDIANCE) 10 MG tablet Take 1 tablet by mouth daily. 30 tablet 11    aspirin 81 MG chewable tablet Chew 1 tablet by mouth daily. Do not start before March 3, 2024. 30 tablet 1    furosemide (LASIX) 20 MG tablet Take 1 tablet by mouth daily. Do not start before 2024. 30 tablet 1    budesonide-formoterol (Symbicort) 160-4.5 MCG/ACT inhaler Inhale 2 puffs into the lungs  in the morning and 2 puffs in the evening. 10.2 g 1    albuterol-ipratropium (COMBIVENT RESPIMAT) 100-20 MCG/ACT inhaler Inhale 1 puff into the lungs every 4 hours as needed for Shortness of Breath. 4 g 1    DM-Doxylamine-Acetaminophen (NYQUIL COLD & FLU PO) Take 0.5 Capfuls by mouth at bedtime as needed.      Camphor-Eucalyptus-Menthol (VICKS VAPORUB EX) Applies topically under the nose nightly as needed      hydroCORTisone valerate (WESTCORT) 0.2 % cream Apply to affected areas of face sparingly, rub in thoroughly, twice daily as needed. 15 g 1    fluticasone (FLONASE) 50 MCG/ACT nasal spray Spray 2 sprays in each nostril daily. 16 g 11     No current facility-administered medications for this visit.        The following items on the Medicare Health Risk Assessment were found to be positive      Vision and Hearing screens: Both screenings were performed and reviewed    Advance care planning documents on file - no     Cognitive/Functional Status: no evidence of cognitive dysfunction by direct observation    Opioid Review: Kb is not taking opioid medications.    Recent PHQ 2/9 Score:    PHQ 2:  PHQ 2 Score Adult PHQ 2 Score Adult PHQ 2 Interpretation Little interest or pleasure in activity?   4/8/2024   9:32 AM 0 No further screening needed 0       PHQ 9:       DEPRESSION ASSESSMENT/PLAN:  Depression screening is negative no further plan needed.     Body mass index is 30.29 kg/m².    BMI FOLLOW-UP/PLAN:  Patient BMI is within normal range.      Needed follow up:  None    See orders.   See Patient Instructions section.   No follow-ups on file.